# Patient Record
Sex: FEMALE | Race: WHITE | NOT HISPANIC OR LATINO | Employment: OTHER | URBAN - METROPOLITAN AREA
[De-identification: names, ages, dates, MRNs, and addresses within clinical notes are randomized per-mention and may not be internally consistent; named-entity substitution may affect disease eponyms.]

---

## 2019-04-25 ENCOUNTER — OFFICE VISIT (OUTPATIENT)
Dept: UROGYNECOLOGY | Facility: CLINIC | Age: 69
End: 2019-04-25
Payer: MEDICARE

## 2019-04-25 VITALS
HEIGHT: 69 IN | WEIGHT: 198.63 LBS | BODY MASS INDEX: 29.42 KG/M2 | SYSTOLIC BLOOD PRESSURE: 164 MMHG | DIASTOLIC BLOOD PRESSURE: 90 MMHG

## 2019-04-25 DIAGNOSIS — R35.1 NOCTURIA MORE THAN TWICE PER NIGHT: ICD-10-CM

## 2019-04-25 DIAGNOSIS — R33.9 INCOMPLETE EMPTYING OF BLADDER: Primary | ICD-10-CM

## 2019-04-25 DIAGNOSIS — N81.6 RECTOCELE, FEMALE: ICD-10-CM

## 2019-04-25 DIAGNOSIS — N81.11 CYSTOCELE, MIDLINE: ICD-10-CM

## 2019-04-25 DIAGNOSIS — N81.9 VAGINAL VAULT PROLAPSE: ICD-10-CM

## 2019-04-25 DIAGNOSIS — K59.09 CHRONIC CONSTIPATION: ICD-10-CM

## 2019-04-25 DIAGNOSIS — R35.0 URINATION FREQUENCY: ICD-10-CM

## 2019-04-25 DIAGNOSIS — N95.2 VAGINAL ATROPHY: ICD-10-CM

## 2019-04-25 DIAGNOSIS — R15.0 INCOMPLETE DEFECATION: ICD-10-CM

## 2019-04-25 PROCEDURE — 51701 INSERT BLADDER CATHETER: CPT | Mod: S$GLB,,, | Performed by: OBSTETRICS & GYNECOLOGY

## 2019-04-25 PROCEDURE — 51701 PR INSERTION OF NON-INDWELLING BLADDER CATHETERIZATION FOR RESIDUAL UR: ICD-10-PCS | Mod: S$GLB,,, | Performed by: OBSTETRICS & GYNECOLOGY

## 2019-04-25 PROCEDURE — 99205 OFFICE O/P NEW HI 60 MIN: CPT | Mod: 25,S$GLB,, | Performed by: OBSTETRICS & GYNECOLOGY

## 2019-04-25 PROCEDURE — 99999 PR PBB SHADOW E&M-NEW PATIENT-LVL III: ICD-10-PCS | Mod: PBBFAC,,, | Performed by: OBSTETRICS & GYNECOLOGY

## 2019-04-25 PROCEDURE — 99999 PR PBB SHADOW E&M-NEW PATIENT-LVL III: CPT | Mod: PBBFAC,,, | Performed by: OBSTETRICS & GYNECOLOGY

## 2019-04-25 PROCEDURE — 87086 URINE CULTURE/COLONY COUNT: CPT

## 2019-04-25 PROCEDURE — 99205 PR OFFICE/OUTPT VISIT, NEW, LEVL V, 60-74 MIN: ICD-10-PCS | Mod: 25,S$GLB,, | Performed by: OBSTETRICS & GYNECOLOGY

## 2019-04-25 RX ORDER — TRAZODONE HYDROCHLORIDE 100 MG/1
TABLET ORAL
Refills: 2 | COMMUNITY
Start: 2019-02-04 | End: 2022-04-20 | Stop reason: SDUPTHER

## 2019-04-25 RX ORDER — MESALAMINE 375 MG/1
CAPSULE, EXTENDED RELEASE ORAL
Refills: 11 | COMMUNITY
Start: 2019-03-07 | End: 2021-02-10

## 2019-04-25 RX ORDER — OMEPRAZOLE 40 MG/1
CAPSULE, DELAYED RELEASE ORAL
Refills: 3 | COMMUNITY
Start: 2019-03-07 | End: 2022-04-20 | Stop reason: SDUPTHER

## 2019-04-25 NOTE — LETTER
April 30, 2019      Rivera Carr MD  [address] [facility] [place] [address]           [facility]   [address] [place] [id number]  Phone: [phone]          Patient: Sosa Viramontes   MR Number: [id number]   YOB: 1950   Date of Visit: 4/25/2019       Dear Dr. Rivera Carr:    Thank you for referring Sosa Viramontes to me for evaluation. Attached you will find relevant portions of my assessment and plan of care.    If you have questions, please do not hesitate to call me. I look forward to following Sosa Viramontes along with you.    Sincerely,    Jarod Zapien MD    Enclosure  CC:  No Recipients    If you would like to receive this communication electronically, please contact [email or web address] or [phone] to request more information on [facility] access.    For providers and/or their staff who would like to refer a patient to [facility], please contact us through our one-stop-shop provider referral line, [facility], at [phone].    If you feel you have received this communication in error or would no longer like to receive these types of communications, please e-mail [email or web address]

## 2019-04-25 NOTE — PATIENT INSTRUCTIONS
Bladder Irritants  Certain foods and drinks have been associated with worsening symptoms of urinary frequency, urgency, urge incontinence, or bladder pain. If you suffer from any of these conditions, you may wish to try eliminating one or more of these foods from your diet and see if your symptoms improve. If bladder symptoms are related to dietary factors, strict adherence to a diet thateliminates the food should bring marked relief in 10 days. Once you are feeling better, you can begin to add foods back into your diet, one at a time. If symptoms return, you will be able to identify the irritant. As you add foods back to your diet it is very important that you drink significant amounts of water.    -----------------------------------------------------------------------------------------------  List of Common Bladder Irritants*  Alcoholic beverages  Apples and apple juice  Cantaloupe  Carbonated beverages  Chili and spicy foods  Chocolate  Citrus fruit  Coffee (including decaffeinated)  Cranberries and cranberry juice  Grapes  Guava  Milk Products: milk, cheese, cottage cheese, yogurt, ice cream  Peaches  Pineapple  Plums  Strawberries  Sugar especially artificial sweeteners, saccharin, aspartame, corn sweeteners, honey, fructose, sucrose, lactose  Tea  Tomatoes and tomato juice  Vitamin B complex  Vinegar  *Most people are not sensitive to ALL of these products; your goal is to find the foods that make YOUR symptoms worse.  ---------------------------------------------------------------------------------------------------    Low-acid fruit substitutions include apricots, papaya, pears and watermelon. Coffee drinkers can drink Kava or other lowacid instant drinks. Tea drinkers can substitute non-citrus herbal and sun brewed teas. Calcium carbonate co-buffered with calcium ascorbate can be substituted for Vitamin C. Prelief is a dietary supplement that works as an acid blocker for the bladder.    Where to get more  information:        Overcoming Bladder Disorders by Melissa Aguiar and Loan Marcus, 1990        You Dont Have to Live with Cystitis! By Leigh Oconnell, 1988  · http://www.urologymanagement.org/oab    --------------------------------------------------------------------------------      Fiber Information Sheet  Your doctor has recommended that you follow a high fiber diet. The addition of fiber to your diet can make an enormous difference in your bowel control and regularity. Fiber helps people whether they lose stool or have trouble with constipation. Fiber works by bulking the stool and keeping it formed, yet making the movement soft and easy to pass. Fiber helps keep moisture within the stool so that neither diarrhea nor hard stool occurs. Fiber makes the bowels work more regularly, but it is not a laxative. An additional bonus from eating a high fiber diet is that your risk of cancer is reduced, too.    Most of us eat some high fiber foods already, but nearly all of us do not eat the necessary amount. For example, a slice of whole wheat bread contains only about 10% of the daily recommended amount of fiber. This means if you are relying on only whole wheat bread to meet the recommended fiber requirements, you would need to eat  between 10-18 slices every day! Please note that fiber is NOT in any meat or dairy product. It is only found in grains, vegetables and fruits. The recommended daily fiber intake is 20-25 grams. Foods having high fiber content include:     Fiber One Cereal, ½ cup 13.0 g   Dhaliwal beans, ¾ cup 10.4 g   Wheat bran cereal, 1 oz 10.0 g   Kidney beans, ¾ cup 9.3 g   All Bran Cereal, ½ cup 6.0 g   Oat Bran Cereal, hot, 1 oz 4.0 g   Banana, 1medium 3.8 g   Canned pears, ½ cup 3.7 g   3 prunes or ¼ cup raisins 3.5 g   Whole Wheat Total, 1 cup 3.0 g   Carrots, ½ cup 3.2 g   Apple, small 2.8 g   Broccoli, ½ cup 2.8 g   Cauliflower, ½ cup 2.6 g   Oatmeal, 1 oz 2.5  g   Whole Wheat Toast 2.0 g   Cheerios, 1 1/3 cup 2.0 g   Baked potato with skin 2.0 g   Corn, ½ cup 1.9 g   Popcorn, 3 cups 1.9 g   Orange, medium 1.9 g   Granola bar 1.0 g   Lettuce, ½ cup 0.9 g    If you dont think that you can get enough fiber through your everyday diet, there are many good fiber supplements you can take along with eating your high fiber diet. Some of these are: Metamucil (1 heaping teaspoon or 1-2 wafers), Citrucel (1 tablespoon), Fiberall (1-2 wafers or 1 teaspoon), Perdium (2 rounded teaspoons) and 1-2 teaspoons unprocessed bran (to mix with foods)    You may need to use the fiber supplement up to 3-4 times daily to produce normal elimination. Please follow specific package directions or call us for help in regulating the dose. You may notice some bloating and/or increased gas at first. These symptoms can be relieved by adding fiber to your diet slowly. Once your body gets used to this increased fiber, these symptoms will go away.   --------------------------------------------------------------------------      1)  Stage 3 cystocele, stage 2 rectocele, stage 1 vaginal vault prolapse:  --observation vs pessary vs surgery (laparoscopic/robotic sacral colpopexy)  --if surgery:  Need bladder testing to see if hidden leakage and need for sling. (h/o LIZETH procedure)  --really work on chronic straining activity (constipation, heavy lifting)  --if surgery: need to see PCP for clearance.  Last visit 1/19:  Reilly Casanova MD ([place]) + need labs/EKG.      2)  Incomplete bladder emptying:  --see if improves on urodynamics with pessary in place  --renal US same day as UDS    3)  Urinary frequency:  --urine C&S  --may improve with POP repair/improvement of emptying  --Empty bladder every 3 hours.  Empty well: wait a minute, lean forward on toilet.    --Avoid dietary irritants (see sheet).  Keep diary x 3-5 days to determine your irritants.  --KEGELS: do 10 in AM and 10 in PM, holding each x 10 seconds.   When you feel urge to go, STOP, KEGEL, and when urge has passed, then go to bathroom.  Consider PT in future.    --URGE: consider medication in future.  Takes 2-4 weeks to see if will have effect.  For dry mouth: get sour, sugar free lozenge or gum.    --STRESS:  None now.  Do bladder testing to see if need sling.     4)  Constipation with incomplete emptying:  --hydrate well  Controlling constipation may help bladder urgency/leakage and fiber may better control cholesterol and blood glucose.  Start daily fiber.  Take 1 T of fiber powder (psyllium or other sugar-free powder).  Mix in 8 oz of water.  Take x 3-5 days.  Then, increase fiber by 1 T every 3-5 days until stool is easy to pass.  Stop and continue at that dose.   Do not exceed 6 tsps/day.  May also use over the counter stool softener (ducosate sodium) 1-2 x/day.  AVOID laxatives.  --take 400 mg magnesium oxide daily  --take miralax if needed  --incomplete defecation: likely related to rectocele (will repair surgically); improving stool consistency can help this    5)  Vaginal atrophy (dryness):  Use 0.5 gram or dime-sized amount of estrogen cream in vagina twice a week thereafter. Can use applicator or finger.     6)  Nocturia (nighttime urination): stop fluids 2 hours before bed. No water by the bed.  If have leg swelling:  Elevate feet above chest x 1 hour before bed to get excess fluid off.  Can also use support hose (knee highs).      7)  Will have NP call you to see if you'd like to move forward with surgery.

## 2019-04-25 NOTE — PROGRESS NOTES
[facility] UROGYN [facility] 4   [address] [address]    Sosa Viramontes  [id number]  1950    Consulting Physician: Rivera Carr MD   GYN: MD Bruno  Primary M.D.: Reilly Casanova MD ([place])    Chief Complaint   Patient presents with    Vaginal Prolapse    Urinary Retention       HPI:     1)  UI:  (--) LIZETH . (--) UUI.  Does have some extreme urgency and trouble holding when she gets to toilet.   (--) pads. Daytime frequency: Q 1 hours.  Nocturia: Yes: 2/night.   (--) dysuria,  (--) hematuria,  (--) frequent UTIs.  (--) complete bladder emptying. DV/PV to help with mod-large 2nd volume.     2)  POP:  Present. below introitus x years, worsening x few years.  Symptoms:(+)  Discomfort, heaviness, incomplete bladder/bowel evacuation.  Tried pessary--doesn't stay in place.   (--) vaginal bleeding. (+) vaginal discharge/itching. (--) sexually active.  (--) dyspareunia.  (+)  Vaginal dryness.  (+) vaginal estrogen use-started 2 months ago, not using regularly.     3)  BM:  (+) constipation/straining. Has microscopic colitis +polyps--started metamucil 2 T in 1 cup of water. Takes miralax when gets constipated. No stool softener.  Sees GI MD.  (--) chronic diarrhea. (--) hematochezia.  (--) fecal incontinence.  (--) fecal smearing/urgency.  (--) complete evacuation.  Rocks back and forth to evacuate.     Past Medical History  Past Medical History:   Diagnosis Date    Colitis     GERD (gastroesophageal reflux disease)         Past Surgical History  Past Surgical History:   Procedure Laterality Date    ABLATION      BLADDER SUSPENSION      CHOLECYSTECTOMY      HYSTERECTOMY         LSC james    Hysterectomy: Yes  Date: .  Indication: fibroids/menorrhagia.    Type: xlap/vertical   Cervix present: No  Ovaries present: No  Other procedures at time of hysterectomy:  ? Procedure with URO for LIZETH    Past Ob History     x 4.  C/s x 0.    Largest  infant weight: 10#  no FAVD. yes episiotomy.      Gynecologic History  LMP: No LMP recorded. Patient has had a hysterectomy.  Age of menarche: 14 yo  Age of menopause: with EVELIO  Menstrual history: h/o menorrhagia  Pap test: post EVELIO.  History of abnormal paps: No.  History of STIs:  No  Mammogram: Date of last: 2018.  Result: Normal per report.   Colonoscopy: Date of last: 1/2017.  Result:  Normal per report.  Repeat due:  2020 per local GI.    DEXA:  Date of last: 2017.  Result:  Normal per report.  Repeat due:  Per PCP/GYN.     Family History  Family History   Problem Relation Age of Onset    Vaginal cancer Neg Hx     Endometrial cancer Neg Hx     Cervical cancer Neg Hx     Breast cancer Neg Hx       Colon CA: No  Breast CA: Yes - MA  GYN CA: No   CA: No    Social History  Social History     Tobacco Use   Smoking Status Former Smoker   Smokeless Tobacco Never Used     Cessation date: 2006.  PPD:  2 x 36 years.   Social History     Substance and Sexual Activity   Alcohol Use Yes   .    Social History     Substance and Sexual Activity   Drug Use Never     The patient is same sex partner.  Resides [place] [facility] [place] [facility] [place] [address].  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      Allergies  Review of patient's allergies indicates:  No Known Allergies    Medications  Current Outpatient Medications on File Prior to Visit   Medication Sig Dispense Refill    APRISO 0.375 gram Cp24 TK 4 CS PO QAM  11    omeprazole (PRILOSEC) 40 MG capsule TK 1 C PO D  3    ranitidine (ZANTAC) 300 MG tablet TK 1 T PO HS  WITH THE DAYAN MEAL  3    traZODone (DESYREL) 100 MG tablet TK 1 T PO HS  2     No current facility-administered medications on file prior to visit.        Review of Systems A 14 point ROS was reviewed with pertinent positives as noted above in the history of present illness.      Constitutional: negative  Eyes: negative  Endocrine: negative  Gastrointestinal: negative  Cardiovascular: negative  Respiratory:  "negative  Allergic/Immunologic: negative  Integumentary: negative  Psychiatric: negative  Musculoskeletal: negative   Ear/Nose/Throat: negative  Neurologic: negative  Genitourinary: SEE HPI  Hematologic/Lymphatic: negative   Breast: negative    Urogynecologic Exam  BP (!) 164/90 (BP Location: Left arm, Patient Position: Sitting)   Ht 5' 9" (1.753 m)   Wt 90.1 kg (198 lb 10.2 oz)   BMI 29.33 kg/m²     GENERAL APPEARANCE:  The patient is well-developed, well-nourished.  Neck:  Supple with no thyromegaly, no carotid bruits.  Heart:  Regular rate and rhythm, no murmurs, rubs or gallops.  Lungs:  Clear.  No CVA tenderness.  Abdomen:  Soft, nontender, nondistended, no hepatosplenomegaly.  Incisions:  Vertical midline (to umbilicus) well-healed    PELVIC:    External genitalia:  Normal Bartholins, Skenes and labia bilaterally.    Urethra:  No caruncle, diverticulum or masses.  (+) hypermobility.    Vagina:  Atrophy (--) , no bladder masses or tender, no discharge.    Cervix:  absent  Uterus: uterus absent  Adnexa: Not palpable.    POP-Q:  Aa +3; Ba +3; C -4; Ap -1; Bp -1.  Genital hiatus 5, perineal body 2, total vaginal length 10-11.    NEUROLOGIC:  Cranial nerves 2 through 12 intact.  Strength 5/5.  DTRs 2+ lower extremities.  S2 through 4 normal.  Sacral reflexes intact.    EXT: SENA, 2+ pulses bilaterally, no C/C/E    COUGH STRESS TEST:  negative  KEGEL: 1 /5    RECTAL:    External:  Normal, (--) hemorrhoids, (--) dovetailing.   Internal:  deferred    PVR: 70 mL    Impression    1. Incomplete emptying of bladder    2. Cystocele, midline    3. Rectocele, female    4. Vaginal vault prolapse    5. Urination frequency    6. Chronic constipation    7. Incomplete defecation    8. Vaginal atrophy    9. Nocturia more than twice per night        Initial Plan  The patient was counseled regarding these issues. The patient was given a summary sheet containing each of these issues with possible options for evaluation and " management. When appropriate, we also reviewed computer-generated diagrams specific to their diagnoses..  All questions were addressed to the patient's satisfaction.    1)  Stage 3 cystocele, stage 2 rectocele, stage 1 vaginal vault prolapse:  --observation vs pessary vs surgery (laparoscopic/robotic sacral colpopexy)  --if surgery:  Need bladder testing to see if hidden leakage and need for sling. (h/o LIZETH procedure)  --really work on chronic straining activity (constipation, heavy lifting)  --if surgery: need to see PCP for clearance.  Last visit 1/19:  Reilly Casanova MD ([place]) + need labs/EKG.      2)  Incomplete bladder emptying:  --see if improves on urodynamics with pessary in place  --renal US same day as UDS    3)  Urinary frequency:  --urine C&S  --may improve with POP repair/improvement of emptying  --Empty bladder every 3 hours.  Empty well: wait a minute, lean forward on toilet.    --Avoid dietary irritants (see sheet).  Keep diary x 3-5 days to determine your irritants.  --KEGELS: do 10 in AM and 10 in PM, holding each x 10 seconds.  When you feel urge to go, STOP, KEGEL, and when urge has passed, then go to bathroom.  Consider PT in future.    --URGE: consider medication in future.  Takes 2-4 weeks to see if will have effect.  For dry mouth: get sour, sugar free lozenge or gum.    --STRESS:  None now.  Do bladder testing to see if need sling.     4)  Constipation with incomplete emptying:  --hydrate well  Controlling constipation may help bladder urgency/leakage and fiber may better control cholesterol and blood glucose.  Start daily fiber.  Take 1 T of fiber powder (psyllium or other sugar-free powder).  Mix in 8 oz of water.  Take x 3-5 days.  Then, increase fiber by 1 T every 3-5 days until stool is easy to pass.  Stop and continue at that dose.   Do not exceed 6 tsps/day.  May also use over the counter stool softener (ducosate sodium) 1-2 x/day.  AVOID laxatives.  --take 400 mg magnesium oxide  daily  --take miralax if needed  --incomplete defecation: likely related to rectocele (will repair surgically); improving stool consistency can help this    5)  Vaginal atrophy (dryness):  Use 0.5 gram or dime-sized amount of estrogen cream in vagina twice a week thereafter. Can use applicator or finger.     6)  Nocturia (nighttime urination): stop fluids 2 hours before bed. No water by the bed.  If have leg swelling:  Elevate feet above chest x 1 hour before bed to get excess fluid off.  Can also use support hose (knee highs).      7)  Will have NP call you to see if you'd like to move forward with surgery.     Approximately 60 min were spent in consult, 90 % in discussion.    The patient's partner was present for the entire exam and discussion.     Thank you for requesting consultation of your patient.  I look forward to participating in their care.    Jarod Zapien  Female Pelvic Medicine and Reconstructive Surgery  [facility] [place], [place]

## 2019-04-27 LAB — BACTERIA UR CULT: NO GROWTH

## 2019-04-29 ENCOUNTER — TELEPHONE (OUTPATIENT)
Dept: UROGYNECOLOGY | Facility: CLINIC | Age: 69
End: 2019-04-29

## 2019-04-29 NOTE — TELEPHONE ENCOUNTER
Left voice message for pt to give the office a call back at [phone]. Called to relay negative Urine culture results.

## 2019-04-29 NOTE — TELEPHONE ENCOUNTER
Notified pt that her Urine C&S was negative. Pt also states she'd like to schedule surgery in early July.   I informed the pt I'd relay this message to VERONICA Tejeda and someone would get back to her. Pt voiced understanding and call ended.

## 2019-04-29 NOTE — TELEPHONE ENCOUNTER
----- Message from Jarod Zapien MD sent at 4/28/2019  8:02 AM CDT -----  Please let the patient know urine C&S was negative for infection.  Thanks!

## 2019-05-30 ENCOUNTER — TELEPHONE (OUTPATIENT)
Dept: UROGYNECOLOGY | Facility: CLINIC | Age: 69
End: 2019-05-30

## 2019-06-10 ENCOUNTER — TELEPHONE (OUTPATIENT)
Dept: UROGYNECOLOGY | Facility: CLINIC | Age: 69
End: 2019-06-10

## 2019-06-10 ENCOUNTER — PATIENT MESSAGE (OUTPATIENT)
Dept: UROGYNECOLOGY | Facility: CLINIC | Age: 69
End: 2019-06-10

## 2019-06-10 NOTE — TELEPHONE ENCOUNTER
----- Message from Sandy Romero sent at 6/10/2019  8:45 AM CDT -----  Contact: Self            Name of Who is Calling: STAR BENTON [[id number]]      What is the request in detail: Pt states she needs to cancel her procedure for 07/1 and all other appts on 06/27 and states she will call back when she is ready. Please contact to further discuss and advise.        Can the clinic reply by [facility]: N      What Number to Call Back if not in [facility]: [phone]

## 2019-06-10 NOTE — TELEPHONE ENCOUNTER
Returned pt call no answer, Left voice message for pt to give the office a call back at [phone] regarding surgery cancelation.

## 2019-06-10 NOTE — TELEPHONE ENCOUNTER
Attempted to reach patient regarding cancelling surgery. VM left to call office.  Haydee Tejeda, ANUP-BC

## 2019-06-12 ENCOUNTER — TELEPHONE (OUTPATIENT)
Dept: UROGYNECOLOGY | Facility: CLINIC | Age: 69
End: 2019-06-12

## 2019-06-12 NOTE — TELEPHONE ENCOUNTER
Spoke with pt and informed her per NP Marchand July 16, 2019 is ok for her surgery day. Pt also was rescheduled for SUDS, pre-admit and consent signing on 7/11/19 starting at 10 am, reminded pt to get surgery clearance. Pt voiced understanding and call ended. Apt letters mailed out.

## 2019-06-12 NOTE — TELEPHONE ENCOUNTER
----- Message from Westley Ann sent at 6/12/2019  2:14 PM CDT -----  PLEASE CALL PT SHE NEEDS TO RESCHEDULE HER SURGERY [phone]

## 2019-07-01 ENCOUNTER — HOSPITAL ENCOUNTER (OUTPATIENT)
Dept: RADIOLOGY | Facility: HOSPITAL | Age: 69
Discharge: HOME | End: 2019-07-01
Attending: OBSTETRICS & GYNECOLOGY
Payer: MEDICARE

## 2019-07-01 DIAGNOSIS — R33.9 INCOMPLETE EMPTYING OF BLADDER: ICD-10-CM

## 2019-07-01 PROCEDURE — 76770 US RETROPERITONEAL COMPLETE: ICD-10-PCS | Mod: 26,,, | Performed by: RADIOLOGY

## 2019-07-01 PROCEDURE — 76770 US EXAM ABDO BACK WALL COMP: CPT | Mod: 26,,, | Performed by: RADIOLOGY

## 2019-07-01 PROCEDURE — 76770 US EXAM ABDO BACK WALL COMP: CPT | Mod: TC

## 2019-07-02 DIAGNOSIS — N81.9 VAGINAL VAULT PROLAPSE: Primary | ICD-10-CM

## 2019-07-02 DIAGNOSIS — Z01.818 PREOP TESTING: ICD-10-CM

## 2019-07-02 DIAGNOSIS — N81.11 MIDLINE CYSTOCELE: ICD-10-CM

## 2019-07-02 DIAGNOSIS — N81.6 RECTOCELE: ICD-10-CM

## 2019-07-03 ENCOUNTER — TELEPHONE (OUTPATIENT)
Dept: UROGYNECOLOGY | Facility: CLINIC | Age: 69
End: 2019-07-03

## 2019-07-03 NOTE — TELEPHONE ENCOUNTER
----- Message from Jarod Zapien MD sent at 7/2/2019  4:40 PM CDT -----  Please let patient know renal US was normal. Thanks!

## 2019-07-08 ENCOUNTER — HOSPITAL ENCOUNTER (OUTPATIENT)
Dept: CARDIOLOGY | Facility: HOSPITAL | Age: 69
Discharge: HOME | End: 2019-07-08
Attending: INTERNAL MEDICINE
Payer: MEDICARE

## 2019-07-08 ENCOUNTER — HOSPITAL ENCOUNTER (OUTPATIENT)
Dept: RADIOLOGY | Facility: HOSPITAL | Age: 69
Discharge: HOME | End: 2019-07-08
Attending: INTERNAL MEDICINE
Payer: MEDICARE

## 2019-07-08 DIAGNOSIS — R06.02 SOB (SHORTNESS OF BREATH): ICD-10-CM

## 2019-07-08 DIAGNOSIS — R00.2 PALPITATIONS: ICD-10-CM

## 2019-07-08 DIAGNOSIS — R06.02 SOB (SHORTNESS OF BREATH): Primary | ICD-10-CM

## 2019-07-08 PROCEDURE — 71046 X-RAY EXAM CHEST 2 VIEWS: CPT | Mod: TC,FY

## 2019-07-08 PROCEDURE — 93005 ELECTROCARDIOGRAM TRACING: CPT

## 2019-07-08 PROCEDURE — 71046 XR CHEST PA AND LATERAL PRE-OP: ICD-10-PCS | Mod: 26,,, | Performed by: RADIOLOGY

## 2019-07-08 PROCEDURE — 71046 X-RAY EXAM CHEST 2 VIEWS: CPT | Mod: 26,,, | Performed by: RADIOLOGY

## 2019-07-11 ENCOUNTER — ANESTHESIA EVENT (OUTPATIENT)
Dept: SURGERY | Facility: OTHER | Age: 69
End: 2019-07-11
Payer: MEDICARE

## 2019-07-11 ENCOUNTER — HOSPITAL ENCOUNTER (OUTPATIENT)
Dept: PREADMISSION TESTING | Facility: OTHER | Age: 69
Discharge: HOME | End: 2019-07-11
Attending: OBSTETRICS & GYNECOLOGY
Payer: MEDICARE

## 2019-07-11 ENCOUNTER — OFFICE VISIT (OUTPATIENT)
Dept: UROGYNECOLOGY | Facility: CLINIC | Age: 69
End: 2019-07-11
Payer: MEDICARE

## 2019-07-11 ENCOUNTER — PROCEDURE VISIT (OUTPATIENT)
Dept: UROGYNECOLOGY | Facility: CLINIC | Age: 69
End: 2019-07-11
Payer: MEDICARE

## 2019-07-11 VITALS
TEMPERATURE: 98 F | SYSTOLIC BLOOD PRESSURE: 176 MMHG | HEIGHT: 69 IN | HEART RATE: 68 BPM | DIASTOLIC BLOOD PRESSURE: 77 MMHG | BODY MASS INDEX: 27.7 KG/M2 | RESPIRATION RATE: 16 BRPM | OXYGEN SATURATION: 98 % | WEIGHT: 187 LBS

## 2019-07-11 VITALS
BODY MASS INDEX: 27.69 KG/M2 | SYSTOLIC BLOOD PRESSURE: 158 MMHG | DIASTOLIC BLOOD PRESSURE: 74 MMHG | WEIGHT: 186.94 LBS | HEIGHT: 69 IN

## 2019-07-11 VITALS
WEIGHT: 192 LBS | BODY MASS INDEX: 28.44 KG/M2 | SYSTOLIC BLOOD PRESSURE: 158 MMHG | DIASTOLIC BLOOD PRESSURE: 74 MMHG | HEIGHT: 69 IN

## 2019-07-11 DIAGNOSIS — N81.9 VAGINAL VAULT PROLAPSE: ICD-10-CM

## 2019-07-11 DIAGNOSIS — Z01.818 PREOP TESTING: ICD-10-CM

## 2019-07-11 DIAGNOSIS — Z01.818 PREOPERATIVE EXAM FOR GYNECOLOGIC SURGERY: Primary | ICD-10-CM

## 2019-07-11 DIAGNOSIS — N81.6 RECTOCELE: ICD-10-CM

## 2019-07-11 DIAGNOSIS — N81.11 MIDLINE CYSTOCELE: ICD-10-CM

## 2019-07-11 DIAGNOSIS — R33.9 INCOMPLETE EMPTYING OF BLADDER: ICD-10-CM

## 2019-07-11 DIAGNOSIS — N95.2 VAGINAL ATROPHY: ICD-10-CM

## 2019-07-11 LAB
ABO + RH BLD: NORMAL
BILIRUB SERPL-MCNC: NORMAL MG/DL
BLD GP AB SCN CELLS X3 SERPL QL: NORMAL
BLOOD URINE, POC: NORMAL
COLOR, POC UA: NORMAL
GLUCOSE UR QL STRIP: NORMAL
KETONES UR QL STRIP: NORMAL
LEUKOCYTE ESTERASE URINE, POC: NORMAL
NITRITE, POC UA: NORMAL
PH, POC UA: 6
PROTEIN, POC: NORMAL
SPECIFIC GRAVITY, POC UA: 1.01
UROBILINOGEN, POC UA: NORMAL

## 2019-07-11 PROCEDURE — 99499 NO LOS: ICD-10-PCS | Mod: S$GLB,,, | Performed by: NURSE PRACTITIONER

## 2019-07-11 PROCEDURE — 36415 COLL VENOUS BLD VENIPUNCTURE: CPT

## 2019-07-11 PROCEDURE — 52000 PR CYSTOURETHROSCOPY: ICD-10-PCS | Mod: 59,S$GLB,, | Performed by: OBSTETRICS & GYNECOLOGY

## 2019-07-11 PROCEDURE — 99499 UNLISTED E&M SERVICE: CPT | Mod: S$GLB,,, | Performed by: NURSE PRACTITIONER

## 2019-07-11 PROCEDURE — 86850 RBC ANTIBODY SCREEN: CPT

## 2019-07-11 PROCEDURE — 99999 PR PBB SHADOW E&M-EST. PATIENT-LVL III: CPT | Mod: PBBFAC,,, | Performed by: NURSE PRACTITIONER

## 2019-07-11 PROCEDURE — 51741 PR UROFLOWMETRY, COMPLEX: ICD-10-PCS | Mod: 51,S$GLB,, | Performed by: OBSTETRICS & GYNECOLOGY

## 2019-07-11 PROCEDURE — 81002 URINALYSIS NONAUTO W/O SCOPE: CPT | Mod: S$GLB,,, | Performed by: OBSTETRICS & GYNECOLOGY

## 2019-07-11 PROCEDURE — 51741 ELECTRO-UROFLOWMETRY FIRST: CPT | Mod: 51,S$GLB,, | Performed by: OBSTETRICS & GYNECOLOGY

## 2019-07-11 PROCEDURE — 51728 PR COMPLEX CYSTOMETROGRAM VOIDING PRESSURE STUDIES: ICD-10-PCS | Mod: S$GLB,,, | Performed by: OBSTETRICS & GYNECOLOGY

## 2019-07-11 PROCEDURE — 99999 PR PBB SHADOW E&M-EST. PATIENT-LVL III: ICD-10-PCS | Mod: PBBFAC,,, | Performed by: NURSE PRACTITIONER

## 2019-07-11 PROCEDURE — 52000 CYSTOURETHROSCOPY: CPT | Mod: 59,S$GLB,, | Performed by: OBSTETRICS & GYNECOLOGY

## 2019-07-11 PROCEDURE — 81002 POCT URINE DIPSTICK WITHOUT MICROSCOPE: ICD-10-PCS | Mod: S$GLB,,, | Performed by: OBSTETRICS & GYNECOLOGY

## 2019-07-11 PROCEDURE — 51728 CYSTOMETROGRAM W/VP: CPT | Mod: S$GLB,,, | Performed by: OBSTETRICS & GYNECOLOGY

## 2019-07-11 RX ORDER — LIDOCAINE HYDROCHLORIDE 10 MG/ML
0.5 INJECTION, SOLUTION EPIDURAL; INFILTRATION; INTRACAUDAL; PERINEURAL ONCE
Status: CANCELLED | OUTPATIENT
Start: 2019-07-11 | End: 2019-07-11

## 2019-07-11 RX ORDER — LIDOCAINE HYDROCHLORIDE 20 MG/ML
JELLY TOPICAL ONCE
Status: COMPLETED | OUTPATIENT
Start: 2019-07-11 | End: 2019-07-11

## 2019-07-11 RX ORDER — SODIUM CHLORIDE, SODIUM LACTATE, POTASSIUM CHLORIDE, CALCIUM CHLORIDE 600; 310; 30; 20 MG/100ML; MG/100ML; MG/100ML; MG/100ML
INJECTION, SOLUTION INTRAVENOUS CONTINUOUS
Status: CANCELLED | OUTPATIENT
Start: 2019-07-11

## 2019-07-11 RX ORDER — CELECOXIB 200 MG/1
400 CAPSULE ORAL
Status: CANCELLED | OUTPATIENT
Start: 2019-07-11 | End: 2019-07-11

## 2019-07-11 RX ORDER — MIDAZOLAM HYDROCHLORIDE 1 MG/ML
2 INJECTION INTRAMUSCULAR; INTRAVENOUS
Status: CANCELLED | OUTPATIENT
Start: 2019-07-11 | End: 2019-07-11

## 2019-07-11 RX ORDER — ACETAMINOPHEN 500 MG
1000 TABLET ORAL
Status: CANCELLED | OUTPATIENT
Start: 2019-07-11 | End: 2019-07-11

## 2019-07-11 RX ORDER — SULFAMETHOXAZOLE AND TRIMETHOPRIM 800; 160 MG/1; MG/1
1 TABLET ORAL
Status: COMPLETED | OUTPATIENT
Start: 2019-07-11 | End: 2019-07-11

## 2019-07-11 RX ORDER — PREGABALIN 75 MG/1
75 CAPSULE ORAL
Status: CANCELLED | OUTPATIENT
Start: 2019-07-11 | End: 2019-07-11

## 2019-07-11 RX ADMIN — LIDOCAINE HYDROCHLORIDE 5 ML: 20 JELLY TOPICAL at 12:07

## 2019-07-11 RX ADMIN — SULFAMETHOXAZOLE AND TRIMETHOPRIM 1 TABLET: 800; 160 TABLET ORAL at 12:07

## 2019-07-11 NOTE — ANESTHESIA PREPROCEDURE EVALUATION
07/11/2019  Sosa Viramontes is a 68 y.o., female.    Anesthesia Evaluation    I have reviewed the Patient Summary Reports.    I have reviewed the Nursing Notes.   I have reviewed the Medications.     Review of Systems  Anesthesia Hx:  No problems with previous Anesthesia  History of prior surgery of interest to airway management or planning: Denies Family Hx of Anesthesia complications.   Denies Personal Hx of Anesthesia complications.   Social:  Former Smoker    Hematology/Oncology:  Hematology Normal   Oncology Normal     EENT/Dental:EENT/Dental Normal   Cardiovascular:   Exercise tolerance: good Ablation for SVT 12 yrs ago   Pulmonary:  Pulmonary Normal    Renal/:  Renal/ Normal     Hepatic/GI:   GERD, well controlled Esoph Barretts Healed care w [facility]    Musculoskeletal:  Musculoskeletal Normal    Neurological:  Neurology Normal    Endocrine:  Endocrine Normal    Dermatological:  Skin Normal    Psych:  Psychiatric Normal           Physical Exam  General:  Well nourished    Airway/Jaw/Neck:  Airway Findings: Mouth Opening: Normal Tongue: Normal  General Airway Assessment: Adult  Mallampati: II      Dental:  Dental Findings: In tact, Molar caps        Mental Status:  Mental Status Findings:  Cooperative         Anesthesia Plan  Type of Anesthesia, risks & benefits discussed:  Anesthesia Type:  general  Patient's Preference:   Intra-op Monitoring Plan: standard ASA monitors  Intra-op Monitoring Plan Comments:   Post Op Pain Control Plan: multimodal analgesia and per primary service following discharge from PACU  Post Op Pain Control Plan Comments:   Induction:   IV  Beta Blocker:         Informed Consent: Patient understands risks and agrees with Anesthesia plan.  Questions answered. Anesthesia consent signed with patient.  ASA Score: 2     Day of Surgery Review of History & Physical:    H&P update  referred to the surgeon.     Anesthesia Plan Notes: Pt is a landers, requests a small ETT    Labs WNL        Ready For Surgery From Anesthesia Perspective.

## 2019-07-11 NOTE — DISCHARGE INSTRUCTIONS
PRE-ADMIT TESTING -  [phone]    [address]  [place] [facility]          Your surgery has been scheduled at [facility]. We are pleased to have the opportunity to serve you. For Further Information please call [phone].    On the day of surgery please report to the Information Desk on the 1st floor.    · CONTACT YOUR PHYSICIAN'S OFFICE THE DAY PRIOR TO YOUR SURGERY TO OBTAIN YOUR ARRIVAL TIME.     · The evening before surgery do not eat anything after 9 p.m. ( this includes hard candy, chewing gum and mints).  You may only have GATORADE, POWERADE AND WATER  from 9 p.m. until you leave your home.   DO NOT DRINK ANY LIQUIDS ON THE WAY TO THE HOSPITAL.      SPECIAL MEDICATION INSTRUCTIONS: TAKE medications checked off by the Anesthesiologist on your Medication List.    Angiogram Patients: Take medications as instructed by your physician, including aspirin.     Surgery Patients:    If you take ASPIRIN - Your PHYSICIAN/SURGEON will need to inform you IF/OR when you need to stop taking aspirin prior to your surgery.     Do Not take any medications containing IBUPROFEN.  Do Not Wear any make-up or dark nail polish   (especially eye make-up) to surgery. If you come to surgery with makeup on you will be required to remove the makeup or nail polish.    Do not shave your surgical area at least 5 days prior to your surgery. The surgical prep will be performed at the hospital according to Infection Control regulations.    Leave all valuables at home.   Do Not wear any jewelry or watches, including any metal in body piercings. Jewelry must be removed prior to coming to the hospital.  There is a possibility that rings that are unable to be removed may be cut off if they are on the surgical extremity.    Contact Lens must be removed before surgery. Either do not wear the contact lens or bring a case and solution for storage.  Please bring a container for eyeglasses or dentures as required.  Bring  any paperwork your physician has provided, such as consent forms,  history and physicals, doctor's orders, etc.   Bring comfortable clothes that are loose fitting to wear upon discharge. Take into consideration the type of surgery being performed.  Maintain your diet as advised per your physician the day prior to surgery.      Adequate rest the night before surgery is advised.   Park in the Parking lot behind the hospital or in the [facility] across the street from the parking lot. Parking is complimentary.  If you will be discharged the same day as your procedure, please arrange for a responsible adult to drive you home or to accompany you if traveling by taxi.   YOU WILL NOT BE PERMITTED TO DRIVE OR TO LEAVE THE HOSPITAL ALONE AFTER SURGERY.   It is strongly recommended that you arrange for someone to remain with you for the first 24 hrs following your surgery.    The Surgeon will speak to your family/visitor after your surgery regarding the outcome of your surgery and post op care.  The Surgeon may speak to you after your surgery, but there is a possibility you may not remember the details.  Please check with your family members regarding the conversation with the Surgeon.      Thank you for your cooperation.  The Staff of [facility].                Bathing Instructions with Hibiclens     Shower the evening before and morning of your procedure with Hibiclens:   Wash your face with water and your regular face wash/soap   Apply Hibiclens directly on your skin or on a wet washcloth and wash gently. When showering: Move away from the shower stream when applying Hibiclens to avoid rinsing off too soon.   Rinse thoroughly with warm water   Do not dilute Hibiclens         Dry off as usual, do not use any deodorant, powder, body lotions, perfume, after shave or cologne.

## 2019-07-11 NOTE — PROGRESS NOTES
Urogyn follow up  07/11/2019  .  [facility] UR[facility] 4   [address] [address]    Sosa Viramontes  [id number]  1950      Sosa Viramontes is a 68 y.o.  here for a urogyn preop visit.  Last HPI from 04/25/2019    1)  UI:  (--) LIZETH . (--) UUI.  Does have some extreme urgency and trouble holding when she gets to toilet.   (--) pads. Daytime frequency: Q 1 hours.  Nocturia: Yes: 2/night.   (--) dysuria,  (--) hematuria,  (--) frequent UTIs.  (--) complete bladder emptying. DV/PV to help with mod-large 2nd volume.      2)  POP:  Present. below introitus x years, worsening x few years.  Symptoms:(+)  Discomfort, heaviness, incomplete bladder/bowel evacuation.  Tried pessary--doesn't stay in place.   (--) vaginal bleeding. (+) vaginal discharge/itching. (--) sexually active.  (--) dyspareunia.  (+)  Vaginal dryness.  (+) vaginal estrogen use-started 2 months ago, not using regularly.      3)  BM:  (+) constipation/straining. Has microscopic colitis +polyps--started metamucil 2 T in 1 cup of water. Takes miralax when gets constipated. No stool softener.  Sees GI MD.  (--) chronic diarrhea. (--) hematochezia.  (--) fecal incontinence.  (--) fecal smearing/urgency.  (--) complete evacuation.  Rocks back and forth to evacuate      07/11/2019  [place]  1.  VOIDING PHASE:       a.  Uroflowmetry:  · Prolapse reduction: No  · Voided volume:  441 mL   · Voiding time:   230 seconds  · Max flow:  16 mL/s  · Avg flow:   2 mL/s   · PVR:   125 mL     The overall configuration of this uroflow study was prolonged with elevated PVR.       b.  Pressure flow:  · Prolapse reduction: Yes (pessary)  · Patient was unable to void on toilet,  Catheters were removed, and patient allowed to void on toilet, with 1000 mL produced.  · PVR (calculated):  0 mL     PF unable to be performed, but patient was able to void on toilet with 0 calculated PVR.       2.  FILLING PHASE:  · 1st desire: 41 mL  · Normal  desire:  418 mL  · Strong desire:  723 mL  · Urgency:  750 mL  · Compliance (calculated)  ~1000 mL/cm H20  · EMG activity during filling:  With gradual increase.   · Detrusor contractions observed: No.      3.  URETHRAL FUNCTION/STORAGE PHASE:     a.  WITH prolapse reduction:  · CLPP (150 mL): Negative  at  177 cm H20  · VLPP (150 mL): Negative  at  92 cm H20   · CLPP (300 mL): Positive  at  207 cm H20  · VLPP (300 mL): Positive  at  101 cm H20   · CLPP (450 mL): Positive  at  210 cm H20  · VLPP (450 mL): Positive  at  109 cm H20   · CLPP (600 mL): Positive  at  193 cm H20  · VLPP (600 mL): Positive  at  101 cm H20      These findings are consistent with Positive urodynamic stress incontinence.  Assessment:  UF prolonged with elevated PVR.  PF unable to complete but able to void on toilet with normal PVR.  Compliance normal.  Max capacity 1000 mL.  DO (--).  LIZETH (+).      Cysto    Findings: Urethroscopy:  Normal.  Cystoscopy:  Normal bladder mucosa, bilateral ureteral flow was noted.     Assessment: Essentially normal cystourethroscopy      Past Medical History:   Diagnosis Date    Colitis     GERD (gastroesophageal reflux disease)        Past Surgical History:   Procedure Laterality Date    ABLATION      BLADDER SUSPENSION  1998    CHOLECYSTECTOMY      HYSTERECTOMY      1998       Current Outpatient Medications   Medication Sig    APRISO 0.375 gram Cp24 TK 4 CS PO QAM    omeprazole (PRILOSEC) 40 MG capsule TK 1 C PO D    ranitidine (ZANTAC) 300 MG tablet TK 1 T PO HS  WITH THE DAYAN MEAL    traZODone (DESYREL) 100 MG tablet TK 1 T PO HS     No current facility-administered medications for this visit.        Well woman:  Pap test: post EVELIO.  History of abnormal paps: No.  History of STIs:  No  Mammogram: Date of last: 2018.  Result: Normal per report.   Colonoscopy: Date of last: 1/2017.  Result:  Normal per report.  Repeat due:  2020 per local GI.    DEXA:  Date of last: 2017.  Result:  Normal per report.   Repeat due:  Per PCP/GY    ROS:  As per HPI.      Exam  There were no vitals taken for this visit.  General: alert and oriented, no acute distress  Respiratory: normal respiratory effort  Abd: soft, non-tender, non-distended    Pelvic--deferred    Impression  1. Preoperative exam for gynecologic surgery     2. Vaginal vault prolapse     3. Midline cystocele     4. Rectocele     5. Vaginal atrophy       We reviewed the above issues and discussed options for short-term versus long-term management of her problems.   Plan:   1. Patient consented with Dr. Zapien for robotic assisted laparoscopic sacrocolpopexy with synthetic mesh, possible anterior/posterior repair with perineorrhaphy, possible laparotomy, possible uterosacral suspension, placement of synthetic midurethral sling, and cystourethroscopy.   R/B/A reviewed. Specific risks reviewed include:  infection, bleeding, need for blood transfusion, damage to surrounding structures, anesthesia risks, death, heart attack, stroke, mesh erosion/extrusion, pain, dyspareunia, urinary retention, voiding dysfunction, urinary incontinence, exacerbation of urinary urge incontinence, and need for further surgeries.  We reviewed potential for failure of POP defect repair and need for future surgery, with no way of predicting risk.  She understands success rate of ASC approaches 85%.  Success rate of midurethral sling for LIZETH was reviewed as 80-85%, and she understands that this will not necessarily impact other types of urinary incontinence.  Alternatives reviewed include: pessary/PT for POP and pessary/periurethral injections/PT/medication for LIZETH.    2. T&S  3. Preoperative appointment with PCP or cardiology: Yes -   4. VTE Prophylaxis:  heparin 5000 u SQ TID (1st dose 2hrs preop) + SCDs  5. Patient instructed on Magnesium citrate and chlorahexadine/dial soap prep to perform day before & AM of surgery.   6. Proceed to OR for above-mentioned procedure.      30 minutes were spent  in face to face time with this patient  90 % of this time was spent in counseling and/or coordination of care      PHILIP Salamanca-BC  [facility]  Division of Female Pelvic Medicine and Reconstructive Surgery  Department of Obstetrics & Gynecology

## 2019-07-13 NOTE — PROGRESS NOTES
TITLE OF OPERATION:  1. Complex cystometry.  2. Complex uroflowmetry.  3. Electromyography with surface electrodes.  4. Abdominal pressure measurement.  5. Leak point pressure measurement.    INDICATIONS:  1)  UI:  (--) LIZETH . (--) UUI.  Does have some extreme urgency and trouble holding when she gets to toilet.   (--) pads. Daytime frequency: Q 1 hours.  Nocturia: Yes: 2/night.   (--) dysuria,  (--) hematuria,  (--) frequent UTIs.  (--) complete bladder emptying. DV/PV to help with mod-large 2nd volume.      2)  POP:  Present. below introitus x years, worsening x few years.  Symptoms:(+)  Discomfort, heaviness, incomplete bladder/bowel evacuation.  Tried pessary--doesn't stay in place.   (--) vaginal bleeding. (+) vaginal discharge/itching. (--) sexually active.  (--) dyspareunia.  (+)  Vaginal dryness.  (+) vaginal estrogen use-started 2 months ago, not using regularly.   --POP-Q:  Aa +3; Ba +3; C -4; Ap -1; Bp -1.  Genital hiatus 5, perineal body 2, total vaginal length 10-11.  --PVR: 70 mL    3)  BM:  (+) constipation/straining. Has microscopic colitis +polyps--started metamucil 2 T in 1 cup of water. Takes miralax when gets constipated. No stool softener.  Sees GI MD.  (--) chronic diarrhea. (--) hematochezia.  (--) fecal incontinence.  (--) fecal smearing/urgency.  (--) complete evacuation.  Rocks back and forth to evacuate.     PREOPERATIVE DIAGNOSIS:  1. Incomplete emptying of bladder    2. Cystocele, midline    3. Rectocele, female    4. Vaginal vault prolapse    5. Urination frequency    6. Chronic constipation    7. Incomplete defecation    8. Vaginal atrophy    9. Nocturia more than twice per night      POSTOPERATIVE DIAGNOSIS:  1. Incomplete emptying of bladder    2. Cystocele, midline    3. Rectocele, female    4. Vaginal vault prolapse    5. Urination frequency    6. Chronic constipation    7. Incomplete defecation    8. Vaginal atrophy    9. Nocturia more than twice per night    10.  Urodynamic stress  incontinence  11.  Concern for voiding dysfunction    ANESTHESIA:  None.    SPECIMEN (BACTERIOLOGICAL, PATHOLOGICAL OR OTHER):  None.    PROSTHETIC DEVICE/IMPLANT:  None.    SURGEONS NARRATIVE:  A time out was performed in which the patient identity and procedure were confirmed.  Urodynamic evaluation was performed using a computerized system ([employer]  Uroflowmetry was performed on the patient in the sitting position without catheters in place.  Subsequent urodynamic testing was performed with the patient in the lithotomy position at 45 degrees. Air charged catheters were used with sterile water as the infusion medium. Vesical and abdominal (rectal) pressures were measured, and detrusor pressure was calculated. EMG activity was recorded with surface electrodes. During filling, room temperature sterile water was infused at a rate of 30 cubic centimeters per minute. The patient was asked cough after instillation of each 100cc volume. Two Valsalva leak point pressures and two cough leak point pressures were performed with the catheters in place at 300 cubic centimeters and again at maximum capacity. Valsalva leak point pressure was defined as the difference between vesical pressure at which leakage was noted (visualized at the external urethral meatus) and the baseline vesical pressure. Following urodynamic testing, a pressure flow study was performed with the patient in the sitting position. Vesical and abdominal pressures were monitored and detrusor pressures were calculated. After the pressure flow study, the catheters were then removed. The patient tolerated the procedure well.     Urine dipstick: neg.    1.  VOIDING PHASE:      a.  Uroflowmetry:   Prolapse reduction: No   Voided volume:  441 mL    Voiding time:   230 seconds   Max flow:  16 mL/s   Avg flow:   2 mL/s    PVR:   125 mL    The overall configuration of this uroflow study was prolonged with elevated PVR.      b.  Pressure  flow:   Prolapse reduction: Yes (pessary)   Patient was unable to void on toilet,  Catheters were removed, and patient allowed to void on toilet, with 1000 mL produced.   PVR (calculated):  0 mL    PF unable to be performed, but patient was able to void on toilet with 0 calculated PVR.      2.  FILLING PHASE:   1st desire: 41 mL   Normal desire:  418 mL   Strong desire:  723 mL   Urgency:  750 mL   Compliance (calculated)  ~1000 mL/cm H20   EMG activity during filling:  With gradual increase.    Detrusor contractions observed: No.     3.  URETHRAL FUNCTION/STORAGE PHASE:    a.  WITH prolapse reduction:   CLPP (150 mL): Negative  at  177 cm H20   VLPP (150 mL): Negative  at  92 cm H20    CLPP (300 mL): Positive  at  207 cm H20   VLPP (300 mL): Positive  at  101 cm H20    CLPP (450 mL): Positive  at  210 cm H20   VLPP (450 mL): Positive  at  109 cm H20    CLPP (600 mL): Positive  at  193 cm H20   VLPP (600 mL): Positive  at  101 cm H20     These findings are consistent with Positive urodynamic stress incontinence.  Assessment:  UF prolonged with elevated PVR.  PF unable to complete but able to void on toilet with normal PVR.  Compliance normal.  Max capacity 1000 mL.  DO (--).  LIZETH (+).      Plan:  --add MUS to POP procedure  --has some voiding decision  ----------------------------------------------------    Title of Operation:   Cystourethroscopy.     INDICATIONS:  As above    PREOPERATIVE DIAGNOSIS  As above    POSTOPERATIVE DIAGNOSIS:   As above    Anesthesia:   2% Xylocaine gel.    Specimen (Bacteriological, Pathological or other):   None.     Prosthetic Device/Implant:   None.     Surgeons Narrative:     After informed consent was obtained, the patient was placed in the lithotomy position. The urethral meatus was prepped with Betadine and 10 cubic centimeters of 2% Xylocaine gel were introduced into the urethra. A flexible cystourethroscope was introduced into the bladder. The bladder was  distended with approximately 300 cubic centimeters of sterile water. A systematic survey was performed in which the bladder was surveyed using multiple sequential passes in a clockwise fashion from the bladder dome to the bladder base to the urethrovesical junction. The trigone and ureteral orifices were observed. The scope was then flipped back on itself, and the urethrovesical junction was viewed. A vaginal examining finger was then placed with pressure suburethrally at the urethrovesical junction as the telescope was withdrawn in order to perform positive pressure urethroscopy.  Standard maneuvers of cough, squeeze and Valsalva were performed. The telescope was then completely withdrawn.     Findings: Urethroscopy:  Normal.  Cystoscopy:  Normal bladder mucosa, bilateral ureteral flow was noted.     Assessment: Essentially normal cystourethroscopy.     Plan: The patient will follow up with Dr. Zapien as scheduled.  See urodynamics note from 7/11/19 for further plan details.

## 2019-07-16 ENCOUNTER — HOSPITAL ENCOUNTER (OUTPATIENT)
Facility: OTHER | Age: 69
Discharge: HOME | End: 2019-07-17
Attending: OBSTETRICS & GYNECOLOGY | Admitting: OBSTETRICS & GYNECOLOGY
Payer: MEDICARE

## 2019-07-16 ENCOUNTER — ANESTHESIA (OUTPATIENT)
Dept: SURGERY | Facility: OTHER | Age: 69
End: 2019-07-16
Payer: MEDICARE

## 2019-07-16 ENCOUNTER — TELEPHONE (OUTPATIENT)
Dept: UROGYNECOLOGY | Facility: CLINIC | Age: 69
End: 2019-07-16

## 2019-07-16 DIAGNOSIS — N81.9 VAGINAL VAULT PROLAPSE: ICD-10-CM

## 2019-07-16 DIAGNOSIS — Z98.890 S/P LAPAROSCOPY: Primary | ICD-10-CM

## 2019-07-16 PROCEDURE — 36000710: Performed by: OBSTETRICS & GYNECOLOGY

## 2019-07-16 PROCEDURE — 57260 PR COMBINED ANT/POST COLPORRHAPHY: ICD-10-PCS | Mod: 51,,, | Performed by: OBSTETRICS & GYNECOLOGY

## 2019-07-16 PROCEDURE — 63600175 PHARM REV CODE 636 W HCPCS: Performed by: STUDENT IN AN ORGANIZED HEALTH CARE EDUCATION/TRAINING PROGRAM

## 2019-07-16 PROCEDURE — 63600175 PHARM REV CODE 636 W HCPCS: Performed by: OBSTETRICS & GYNECOLOGY

## 2019-07-16 PROCEDURE — 57260 CMBN ANT PST COLPRHY: CPT | Mod: 51,,, | Performed by: OBSTETRICS & GYNECOLOGY

## 2019-07-16 PROCEDURE — 44180 LAP ENTEROLYSIS: CPT | Mod: 22,,, | Performed by: OBSTETRICS & GYNECOLOGY

## 2019-07-16 PROCEDURE — 25000003 PHARM REV CODE 250: Performed by: OBSTETRICS & GYNECOLOGY

## 2019-07-16 PROCEDURE — 27201423 OPTIME MED/SURG SUP & DEVICES STERILE SUPPLY: Performed by: OBSTETRICS & GYNECOLOGY

## 2019-07-16 PROCEDURE — 25000003 PHARM REV CODE 250: Performed by: STUDENT IN AN ORGANIZED HEALTH CARE EDUCATION/TRAINING PROGRAM

## 2019-07-16 PROCEDURE — 25000003 PHARM REV CODE 250: Performed by: NURSE ANESTHETIST, CERTIFIED REGISTERED

## 2019-07-16 PROCEDURE — 25000003 PHARM REV CODE 250: Performed by: ANESTHESIOLOGY

## 2019-07-16 PROCEDURE — 37000009 HC ANESTHESIA EA ADD 15 MINS: Performed by: OBSTETRICS & GYNECOLOGY

## 2019-07-16 PROCEDURE — 71000033 HC RECOVERY, INTIAL HOUR: Performed by: OBSTETRICS & GYNECOLOGY

## 2019-07-16 PROCEDURE — S0020 INJECTION, BUPIVICAINE HYDRO: HCPCS | Performed by: OBSTETRICS & GYNECOLOGY

## 2019-07-16 PROCEDURE — 63600175 PHARM REV CODE 636 W HCPCS: Performed by: ANESTHESIOLOGY

## 2019-07-16 PROCEDURE — 36000711: Performed by: OBSTETRICS & GYNECOLOGY

## 2019-07-16 PROCEDURE — 63600175 PHARM REV CODE 636 W HCPCS: Performed by: NURSE ANESTHETIST, CERTIFIED REGISTERED

## 2019-07-16 PROCEDURE — 44180 PR LAP, SURG ENTEROLYSIS: ICD-10-PCS | Mod: 22,,, | Performed by: OBSTETRICS & GYNECOLOGY

## 2019-07-16 PROCEDURE — 57288 REPAIR BLADDER DEFECT: CPT | Mod: 51,,, | Performed by: OBSTETRICS & GYNECOLOGY

## 2019-07-16 PROCEDURE — 71000039 HC RECOVERY, EACH ADD'L HOUR: Performed by: OBSTETRICS & GYNECOLOGY

## 2019-07-16 PROCEDURE — 57288 PR SLING OPER STRES INCONTINENCE: ICD-10-PCS | Mod: 51,,, | Performed by: OBSTETRICS & GYNECOLOGY

## 2019-07-16 PROCEDURE — 37000008 HC ANESTHESIA 1ST 15 MINUTES: Performed by: OBSTETRICS & GYNECOLOGY

## 2019-07-16 PROCEDURE — C1771 REP DEV, URINARY, W/SLING: HCPCS | Performed by: OBSTETRICS & GYNECOLOGY

## 2019-07-16 DEVICE — SLING HALO SHAPE OBTRYX II: Type: IMPLANTABLE DEVICE | Site: BLADDER | Status: FUNCTIONAL

## 2019-07-16 RX ORDER — LIDOCAINE HYDROCHLORIDE 10 MG/ML
0.5 INJECTION, SOLUTION EPIDURAL; INFILTRATION; INTRACAUDAL; PERINEURAL ONCE
Status: DISCONTINUED | OUTPATIENT
Start: 2019-07-16 | End: 2019-07-16 | Stop reason: HOSPADM

## 2019-07-16 RX ORDER — DIPHENHYDRAMINE HYDROCHLORIDE 50 MG/ML
12.5 INJECTION INTRAMUSCULAR; INTRAVENOUS EVERY 30 MIN PRN
Status: DISCONTINUED | OUTPATIENT
Start: 2019-07-16 | End: 2019-07-16 | Stop reason: HOSPADM

## 2019-07-16 RX ORDER — ONDANSETRON 8 MG/1
8 TABLET, ORALLY DISINTEGRATING ORAL EVERY 8 HOURS PRN
Status: DISCONTINUED | OUTPATIENT
Start: 2019-07-16 | End: 2019-07-17 | Stop reason: HOSPADM

## 2019-07-16 RX ORDER — CELECOXIB 200 MG/1
400 CAPSULE ORAL
Status: DISCONTINUED | OUTPATIENT
Start: 2019-07-16 | End: 2019-07-16 | Stop reason: HOSPADM

## 2019-07-16 RX ORDER — HEPARIN SODIUM 5000 [USP'U]/ML
5000 INJECTION, SOLUTION INTRAVENOUS; SUBCUTANEOUS
Status: DISCONTINUED | OUTPATIENT
Start: 2019-07-16 | End: 2019-07-16 | Stop reason: HOSPADM

## 2019-07-16 RX ORDER — MEPERIDINE HYDROCHLORIDE 25 MG/ML
12.5 INJECTION INTRAMUSCULAR; INTRAVENOUS; SUBCUTANEOUS ONCE AS NEEDED
Status: DISCONTINUED | OUTPATIENT
Start: 2019-07-16 | End: 2019-07-16 | Stop reason: HOSPADM

## 2019-07-16 RX ORDER — NEOSTIGMINE METHYLSULFATE 1 MG/ML
INJECTION, SOLUTION INTRAVENOUS
Status: DISCONTINUED | OUTPATIENT
Start: 2019-07-16 | End: 2019-07-16

## 2019-07-16 RX ORDER — ROCURONIUM BROMIDE 10 MG/ML
INJECTION, SOLUTION INTRAVENOUS
Status: DISCONTINUED | OUTPATIENT
Start: 2019-07-16 | End: 2019-07-16

## 2019-07-16 RX ORDER — BUPIVACAINE HYDROCHLORIDE 5 MG/ML
INJECTION, SOLUTION EPIDURAL; INTRACAUDAL
Status: DISCONTINUED | OUTPATIENT
Start: 2019-07-16 | End: 2019-07-16 | Stop reason: HOSPADM

## 2019-07-16 RX ORDER — HYDROCODONE BITARTRATE AND ACETAMINOPHEN 10; 325 MG/1; MG/1
1 TABLET ORAL EVERY 6 HOURS PRN
Status: DISCONTINUED | OUTPATIENT
Start: 2019-07-16 | End: 2019-07-17 | Stop reason: HOSPADM

## 2019-07-16 RX ORDER — PANTOPRAZOLE SODIUM 40 MG/1
40 TABLET, DELAYED RELEASE ORAL DAILY
Status: DISCONTINUED | OUTPATIENT
Start: 2019-07-16 | End: 2019-07-17 | Stop reason: HOSPADM

## 2019-07-16 RX ORDER — CEFAZOLIN SODIUM 1 G/3ML
2 INJECTION, POWDER, FOR SOLUTION INTRAMUSCULAR; INTRAVENOUS
Status: DISCONTINUED | OUTPATIENT
Start: 2019-07-16 | End: 2019-07-16 | Stop reason: HOSPADM

## 2019-07-16 RX ORDER — LIDOCAINE HCL/PF 100 MG/5ML
SYRINGE (ML) INTRAVENOUS
Status: DISCONTINUED | OUTPATIENT
Start: 2019-07-16 | End: 2019-07-16

## 2019-07-16 RX ORDER — IBUPROFEN 600 MG/1
600 TABLET ORAL EVERY 6 HOURS
Status: DISCONTINUED | OUTPATIENT
Start: 2019-07-16 | End: 2019-07-17 | Stop reason: HOSPADM

## 2019-07-16 RX ORDER — SODIUM CHLORIDE 0.9 % (FLUSH) 0.9 %
3 SYRINGE (ML) INJECTION
Status: DISCONTINUED | OUTPATIENT
Start: 2019-07-16 | End: 2019-07-16

## 2019-07-16 RX ORDER — SODIUM CHLORIDE, SODIUM LACTATE, POTASSIUM CHLORIDE, CALCIUM CHLORIDE 600; 310; 30; 20 MG/100ML; MG/100ML; MG/100ML; MG/100ML
INJECTION, SOLUTION INTRAVENOUS CONTINUOUS
Status: DISCONTINUED | OUTPATIENT
Start: 2019-07-16 | End: 2019-07-17 | Stop reason: HOSPADM

## 2019-07-16 RX ORDER — PHENYLEPHRINE HYDROCHLORIDE 10 MG/ML
INJECTION INTRAVENOUS
Status: DISCONTINUED | OUTPATIENT
Start: 2019-07-16 | End: 2019-07-16

## 2019-07-16 RX ORDER — ONDANSETRON 2 MG/ML
4 INJECTION INTRAMUSCULAR; INTRAVENOUS DAILY PRN
Status: DISCONTINUED | OUTPATIENT
Start: 2019-07-16 | End: 2019-07-16 | Stop reason: HOSPADM

## 2019-07-16 RX ORDER — DEXAMETHASONE SODIUM PHOSPHATE 4 MG/ML
INJECTION, SOLUTION INTRA-ARTICULAR; INTRALESIONAL; INTRAMUSCULAR; INTRAVENOUS; SOFT TISSUE
Status: DISCONTINUED | OUTPATIENT
Start: 2019-07-16 | End: 2019-07-16

## 2019-07-16 RX ORDER — FENTANYL CITRATE 50 UG/ML
INJECTION, SOLUTION INTRAMUSCULAR; INTRAVENOUS
Status: DISCONTINUED | OUTPATIENT
Start: 2019-07-16 | End: 2019-07-16

## 2019-07-16 RX ORDER — MIDAZOLAM HYDROCHLORIDE 1 MG/ML
2 INJECTION INTRAMUSCULAR; INTRAVENOUS
Status: DISCONTINUED | OUTPATIENT
Start: 2019-07-16 | End: 2019-07-16 | Stop reason: HOSPADM

## 2019-07-16 RX ORDER — PROPOFOL 10 MG/ML
VIAL (ML) INTRAVENOUS
Status: DISCONTINUED | OUTPATIENT
Start: 2019-07-16 | End: 2019-07-16

## 2019-07-16 RX ORDER — SODIUM CHLORIDE, SODIUM LACTATE, POTASSIUM CHLORIDE, CALCIUM CHLORIDE 600; 310; 30; 20 MG/100ML; MG/100ML; MG/100ML; MG/100ML
INJECTION, SOLUTION INTRAVENOUS CONTINUOUS
Status: DISCONTINUED | OUTPATIENT
Start: 2019-07-16 | End: 2019-07-16

## 2019-07-16 RX ORDER — HYDROCODONE BITARTRATE AND ACETAMINOPHEN 5; 325 MG/1; MG/1
1 TABLET ORAL EVERY 4 HOURS PRN
Status: DISCONTINUED | OUTPATIENT
Start: 2019-07-16 | End: 2019-07-17 | Stop reason: HOSPADM

## 2019-07-16 RX ORDER — OXYCODONE HYDROCHLORIDE 5 MG/1
5 TABLET ORAL
Status: DISCONTINUED | OUTPATIENT
Start: 2019-07-16 | End: 2019-07-16 | Stop reason: HOSPADM

## 2019-07-16 RX ORDER — ACETAMINOPHEN 500 MG
1000 TABLET ORAL
Status: COMPLETED | OUTPATIENT
Start: 2019-07-16 | End: 2019-07-16

## 2019-07-16 RX ORDER — SODIUM CHLORIDE 0.9 % (FLUSH) 0.9 %
10 SYRINGE (ML) INJECTION
Status: DISCONTINUED | OUTPATIENT
Start: 2019-07-16 | End: 2019-07-17 | Stop reason: HOSPADM

## 2019-07-16 RX ORDER — GLYCOPYRROLATE 0.2 MG/ML
INJECTION INTRAMUSCULAR; INTRAVENOUS
Status: DISCONTINUED | OUTPATIENT
Start: 2019-07-16 | End: 2019-07-16

## 2019-07-16 RX ORDER — ONDANSETRON HYDROCHLORIDE 2 MG/ML
INJECTION, SOLUTION INTRAMUSCULAR; INTRAVENOUS
Status: DISCONTINUED | OUTPATIENT
Start: 2019-07-16 | End: 2019-07-16

## 2019-07-16 RX ORDER — ONDANSETRON 2 MG/ML
4 INJECTION INTRAMUSCULAR; INTRAVENOUS EVERY 6 HOURS PRN
Status: DISCONTINUED | OUTPATIENT
Start: 2019-07-16 | End: 2019-07-17 | Stop reason: HOSPADM

## 2019-07-16 RX ORDER — HEPARIN SODIUM 5000 [USP'U]/ML
5000 INJECTION, SOLUTION INTRAVENOUS; SUBCUTANEOUS EVERY 8 HOURS
Status: DISCONTINUED | OUTPATIENT
Start: 2019-07-16 | End: 2019-07-17 | Stop reason: HOSPADM

## 2019-07-16 RX ORDER — HYDROMORPHONE HYDROCHLORIDE 2 MG/ML
0.4 INJECTION, SOLUTION INTRAMUSCULAR; INTRAVENOUS; SUBCUTANEOUS EVERY 5 MIN PRN
Status: DISCONTINUED | OUTPATIENT
Start: 2019-07-16 | End: 2019-07-16 | Stop reason: HOSPADM

## 2019-07-16 RX ORDER — PREGABALIN 75 MG/1
75 CAPSULE ORAL
Status: COMPLETED | OUTPATIENT
Start: 2019-07-16 | End: 2019-07-16

## 2019-07-16 RX ADMIN — HYDROMORPHONE HYDROCHLORIDE 0.4 MG: 2 INJECTION, SOLUTION INTRAMUSCULAR; INTRAVENOUS; SUBCUTANEOUS at 02:07

## 2019-07-16 RX ADMIN — ROCURONIUM BROMIDE 50 MG: 10 INJECTION INTRAVENOUS at 08:07

## 2019-07-16 RX ADMIN — SODIUM CHLORIDE, SODIUM LACTATE, POTASSIUM CHLORIDE, AND CALCIUM CHLORIDE: 600; 310; 30; 20 INJECTION, SOLUTION INTRAVENOUS at 07:07

## 2019-07-16 RX ADMIN — DEXAMETHASONE SODIUM PHOSPHATE 8 MG: 4 INJECTION, SOLUTION INTRAMUSCULAR; INTRAVENOUS at 08:07

## 2019-07-16 RX ADMIN — CEFAZOLIN 2 G: 330 INJECTION, POWDER, FOR SOLUTION INTRAMUSCULAR; INTRAVENOUS at 08:07

## 2019-07-16 RX ADMIN — PREGABALIN 75 MG: 75 CAPSULE ORAL at 07:07

## 2019-07-16 RX ADMIN — HEPARIN SODIUM 5000 UNITS: 5000 INJECTION, SOLUTION INTRAVENOUS; SUBCUTANEOUS at 10:07

## 2019-07-16 RX ADMIN — HEPARIN SODIUM 5000 UNITS: 5000 INJECTION, SOLUTION INTRAVENOUS; SUBCUTANEOUS at 03:07

## 2019-07-16 RX ADMIN — ROCURONIUM BROMIDE 20 MG: 10 INJECTION INTRAVENOUS at 10:07

## 2019-07-16 RX ADMIN — FENTANYL CITRATE 100 MCG: 50 INJECTION, SOLUTION INTRAMUSCULAR; INTRAVENOUS at 08:07

## 2019-07-16 RX ADMIN — IBUPROFEN 600 MG: 600 TABLET ORAL at 07:07

## 2019-07-16 RX ADMIN — PHENYLEPHRINE HYDROCHLORIDE 100 MCG: 10 INJECTION INTRAVENOUS at 10:07

## 2019-07-16 RX ADMIN — LIDOCAINE HYDROCHLORIDE 60 MG: 20 INJECTION, SOLUTION INTRAVENOUS at 08:07

## 2019-07-16 RX ADMIN — PANTOPRAZOLE SODIUM 40 MG: 40 TABLET, DELAYED RELEASE ORAL at 03:07

## 2019-07-16 RX ADMIN — ONDANSETRON 4 MG: 2 INJECTION, SOLUTION INTRAMUSCULAR; INTRAVENOUS at 08:07

## 2019-07-16 RX ADMIN — OXYCODONE HYDROCHLORIDE 5 MG: 5 TABLET ORAL at 01:07

## 2019-07-16 RX ADMIN — ACETAMINOPHEN 1000 MG: 500 TABLET, FILM COATED ORAL at 07:07

## 2019-07-16 RX ADMIN — HEPARIN SODIUM 5000 UNITS: 5000 INJECTION, SOLUTION INTRAVENOUS; SUBCUTANEOUS at 07:07

## 2019-07-16 RX ADMIN — HYDROCODONE BITARTRATE AND ACETAMINOPHEN 1 TABLET: 5; 325 TABLET ORAL at 10:07

## 2019-07-16 RX ADMIN — SODIUM CHLORIDE, SODIUM LACTATE, POTASSIUM CHLORIDE, AND CALCIUM CHLORIDE: 600; 310; 30; 20 INJECTION, SOLUTION INTRAVENOUS at 10:07

## 2019-07-16 RX ADMIN — GLYCOPYRROLATE 0.2 MG: 0.2 INJECTION, SOLUTION INTRAMUSCULAR; INTRAVENOUS at 08:07

## 2019-07-16 RX ADMIN — NEOSTIGMINE METHYLSULFATE 5 MG: 1 INJECTION INTRAVENOUS at 12:07

## 2019-07-16 RX ADMIN — HYDROMORPHONE HYDROCHLORIDE 0.4 MG: 2 INJECTION, SOLUTION INTRAMUSCULAR; INTRAVENOUS; SUBCUTANEOUS at 01:07

## 2019-07-16 RX ADMIN — GLYCOPYRROLATE 0.6 MG: 0.2 INJECTION, SOLUTION INTRAMUSCULAR; INTRAVENOUS at 12:07

## 2019-07-16 RX ADMIN — SODIUM CHLORIDE, SODIUM LACTATE, POTASSIUM CHLORIDE, AND CALCIUM CHLORIDE: .6; .31; .03; .02 INJECTION, SOLUTION INTRAVENOUS at 03:07

## 2019-07-16 RX ADMIN — PROPOFOL 200 MG: 10 INJECTION, EMULSION INTRAVENOUS at 08:07

## 2019-07-16 NOTE — PROGRESS NOTES
Post Op Progress Note  Urogynecology    SUBJECTIVE:     Sosa Viramontes is a 68 y.o.  who is POD#0 s/p RA-diagnostic laparoscopy, mid urethral sling, anterior and posterior repair for the treatment of pelvic organ prolapse.  She has no complaints and reports pain is well controlled. She has not yet ambulated. She is tolerating a regular diet already. She is voiding via edward catheter and has passed flatus.     OBJECTIVE:     Vital Signs   Temp:  [97.8 °F (36.6 °C)-98.5 °F (36.9 °C)] 98 °F (36.7 °C)  Pulse:  [66-75] 71  Resp:  [18-20] 18  SpO2:  [93 %-98 %] 96 %  BP: (127-194)/(56-84) 129/61    Intake/Output Summary (Last 24 hours) at 2019 1722  Last data filed at 2019 1343  Gross per 24 hour   Intake 1800 ml   Output 1050 ml   Net 750 ml     Physical Exam:  Gen: A&Ox3, NAD  CV: RRR  Pulm: LCTAB, normal respiratory effort  Abd: active bowel sounds, soft, non-distended, non-tender to palpation without rebound or guarding  Incisions with bandages in place that are clean and dry.  Ext: PPP, no peripheral edema, TEDs/SCDs in place  : Edward in place draining clear yellow urine     ASSESSMENT/PLAN:     Assessment: 68 y.o.  s/p RA-dx laparoscopy, MUS, A&P repair, and cysto for the treatment of POP.     Plan:   Continue routine post op care:  Regular diet. Will d/c IVF when tolerating a full meal.   Continue PO pain control  DVT ppx with teds/SCDs, and heparin 5000 u tid  Continue pantoprazole for reflux  Colace for constipation ppx    Khari Martinez M.D.  Obstetrics & Gynecology  PGY-3

## 2019-07-16 NOTE — PLAN OF CARE
Discharge Planning:  Patient admitted on 7-16-19  LOS-day 0  Chart reviewed, Care plan discussed  Discussed care plan with treatment team,  attending Dr Zapien  Consults following are: case mgt.   PCP updated in [facility]: yes  Pharmacy, updated in [facility]: [facility] in [place], [place]  DME at home: nebulizer  Current dispo: pain control, home soon  Transportation: has reliable, S/O at the bedside  Case management  to follow       07/16/19 1529   Discharge Assessment   Assessment Type Discharge Planning Assessment   Confirmed/corrected address and phone number on facesheet? Yes   Assessment information obtained from? Patient;Caregiver;Medical Record   Communicated expected length of stay with patient/caregiver yes   Prior to hospitilization cognitive status: Alert/Oriented   Prior to hospitalization functional status: Independent   Current cognitive status: Alert/Oriented   Current Functional Status: Independent   Lives With significant other   Able to Return to Prior Arrangements yes   Is patient able to care for self after discharge? Yes   Equipment Currently Used at Home nebulizer   Do you have any problems affording any of your prescribed medications? No   Is the patient taking medications as prescribed? yes   Does the patient have transportation home? Yes   Transportation Anticipated family or friend will provide;car, drives self   Discharge Plan A Home   Patient/Family in Agreement with Plan yes

## 2019-07-16 NOTE — ANESTHESIA POSTPROCEDURE EVALUATION
Anesthesia Post Evaluation    Patient: Sosa Viramontes    Procedure(s) Performed: Procedure(s) (LRB):  COLPORRHAPHY, COMBINED ANTEROPOSTERIOR (N/A)  SLING, MIDURETHRAL (N/A)  CYSTOSCOPY (N/A)  XI ROBOTIC LYSIS, ADHESIONS (N/A)    Final Anesthesia Type: general  Patient location during evaluation: PACU  Patient participation: Yes- Able to Participate  Level of consciousness: awake and alert  Post-procedure vital signs: reviewed and stable  Pain management: adequate  Airway patency: patent  PONV status at discharge: No PONV  Anesthetic complications: no      Cardiovascular status: blood pressure returned to baseline and stable  Respiratory status: unassisted, spontaneous ventilation and nasal cannula  Hydration status: euvolemic  Follow-up not needed.          Vitals Value Taken Time   /56 7/16/2019  1:40 PM   Temp 36.7 °C (98 °F) 7/16/2019  1:30 PM   Pulse 66 7/16/2019  1:51 PM   Resp 20 7/16/2019  1:40 PM   SpO2 95 % 7/16/2019  1:51 PM   Vitals shown include unvalidated device data.      No case tracking events are documented in the log.      Pain/Lazarus Score: Pain Rating Prior to Med Admin: 4 (7/16/2019  1:33 PM)  Pain Rating Post Med Admin: 4 (7/16/2019  1:29 PM)  Lazarus Score: 10 (7/16/2019  1:42 PM)

## 2019-07-16 NOTE — TELEPHONE ENCOUNTER
Spoke with Eli at [facility]-- attempted to expedite claim. She stated we could not expedite based on date of service.  She reviewed clinicals and will approve sling.  Dr. Zapien aware.  Haydee Tejeda, PHILIP-BC

## 2019-07-16 NOTE — TRANSFER OF CARE
"Anesthesia Transfer of Care Note    Patient: Sosa Viramontes    Procedure(s) Performed: Procedure(s) (LRB):  COLPORRHAPHY, COMBINED ANTEROPOSTERIOR (N/A)  SLING, MIDURETHRAL (N/A)  CYSTOSCOPY (N/A)  XI ROBOTIC LYSIS, ADHESIONS (N/A)    Patient location: PACU    Anesthesia Type: general    Transport from OR: Transported from OR on 2-3 L/min O2 by NC with adequate spontaneous ventilation    Post pain: adequate analgesia    Post assessment: no apparent anesthetic complications    Post vital signs: stable    Level of consciousness: awake and alert    Nausea/Vomiting: no nausea/vomiting    Complications: none    Transfer of care protocol was followed      Last vitals:   Visit Vitals  BP (!) 194/84 (BP Location: Right arm, Patient Position: Lying)   Pulse 69   Temp 36.9 °C (98.5 °F) (Oral)   Resp 18   Ht 5' 9" (1.753 m)   Wt 84.8 kg (187 lb)   SpO2 98%   Breastfeeding? No   BMI 27.62 kg/m²     "

## 2019-07-16 NOTE — OP NOTE
Title of Operation:  1)  Robotic-assisted laparoscopic lysis of adhesions (modifier 22 for complexity of dissection, >45 minutes added to case time, and need to abort laparoscopic approach due to significant volume/density)  2)  Anterior repair  3)  Placement of transobturator tension-free midurethral sling, Obtryx II ("[employer]" [employer])  4)  Posterior repair with perineorrhaphy  5)  Cystourethroscopy    Indications for Surgery:  1)  UI:  (--) LIZETH . (--) UUI.  Does have some extreme urgency and trouble holding when she gets to toilet.   (--) pads. Daytime frequency: Q 1 hours.  Nocturia: Yes: 2/night.   (--) dysuria,  (--) hematuria,  (--) frequent UTIs.  (--) complete bladder emptying. DV/PV to help with mod-large 2nd volume.      2)  POP:  Present. below introitus x years, worsening x few years.  Symptoms:(+)  Discomfort, heaviness, incomplete bladder/bowel evacuation.  Tried pessary--doesn't stay in place.   (--) vaginal bleeding. (+) vaginal discharge/itching. (--) sexually active.  (--) dyspareunia.  (+)  Vaginal dryness.  (+) vaginal estrogen use-started 2 months ago, not using regularly.      3)  BM:  (+) constipation/straining. Has microscopic colitis +polyps--started metamucil 2 T in 1 cup of water. Takes miralax when gets constipated. No stool softener.  Sees GI MD.  (--) chronic diarrhea. (--) hematochezia.  (--) fecal incontinence.  (--) fecal smearing/urgency.  (--) complete evacuation.  Rocks back and forth to evacuate        07/11/2019  Suds  1.  VOIDING PHASE:       a.  Uroflowmetry:  · Prolapse reduction: No  · Voided volume:  441 mL   · Voiding time:   230 seconds  · Max flow:  16 mL/s  · Avg flow:   2 mL/s   · PVR:   125 mL     The overall configuration of this uroflow study was prolonged with elevated PVR.       b.  Pressure flow:  · Prolapse reduction: Yes (pessary)  · Patient was unable to void on toilet,  Catheters were removed, and patient allowed to void on toilet, with 1000 mL  produced.  · PVR (calculated):  0 mL     PF unable to be performed, but patient was able to void on toilet with 0 calculated PVR.       2.  FILLING PHASE:  · 1st desire: 41 mL  · Normal desire:  418 mL  · Strong desire:  723 mL  · Urgency:  750 mL  · Compliance (calculated)  ~1000 mL/cm H20  · EMG activity during filling:  With gradual increase.   · Detrusor contractions observed: No.      3.  URETHRAL FUNCTION/STORAGE PHASE:     a.  WITH prolapse reduction:  · CLPP (150 mL): Negative  at  177 cm H20  · VLPP (150 mL): Negative  at  92 cm H20   · CLPP (300 mL): Positive  at  207 cm H20  · VLPP (300 mL): Positive  at  101 cm H20   · CLPP (450 mL): Positive  at  210 cm H20  · VLPP (450 mL): Positive  at  109 cm H20   · CLPP (600 mL): Positive  at  193 cm H20  · VLPP (600 mL): Positive  at  101 cm H20      These findings are consistent with Positive urodynamic stress incontinence.  Assessment:  UF prolonged with elevated PVR.  PF unable to complete but able to void on toilet with normal PVR.  Compliance normal.  Max capacity 1000 mL.  DO (--).  LIZETH (+).     Cysto  Findings: Urethroscopy:  Normal.  Cystoscopy:  Normal bladder mucosa, bilateral ureteral flow was noted.   Assessment: Essentially normal cystourethroscopy    Preoperative Diagnosis:  1. Incomplete emptying of bladder    2. Cystocele, midline    3. Rectocele, female    4. Vaginal vault prolapse    5. Urination frequency    6. Chronic constipation    7. Incomplete defecation    8. Vaginal atrophy    9. Nocturia more than twice per night    10.  Urodynamic stress incontinence  11.  Concern for voiding dysfunction    Postoperative Diagnosis:  1. Incomplete emptying of bladder    2. Cystocele, midline    3. Rectocele, female    4. Vaginal vault prolapse    5. Urination frequency    6. Chronic constipation    7. Incomplete defecation    8. Vaginal atrophy    9. Nocturia more than twice per night    10.  Urodynamic stress incontinence  11.  Concern for voiding  dysfunction    Anesthesia:  General endotracheal anesthesia.  Additionally, 0.5% marcaine was injected prior to placement of each laparoscopic port, and a dilute solution of 1% lidocaine with epinephrine was injected vaginally for local anesthesia.    Specimen (Bacteriological, Pathological or other):  none    Prosthetic Device/Implant:  1)  Obtryx II sling.  LOT# [id number].      Surgeons Narrative:    Surgeon: Jarod Zapien MD    Assistants:  Khari Martinez MD ([id number])     Intravenous Fluids:  1500 mL     Estimated Blood Loss:  100 mL     Urine Output:  850 mL     Counts:  Sponge, lap, needle counts correct x 2.     Drains: Pickering catheter.     Disposition:  The patient was sent to the PACU in stable condition.     Findings:     1.  On exam under anesthesia,  normal external female genitalia. There was prolapse as previously noted in clinic.  Uterus and cervix: Absent  Adnexa: non palpable.      2.  On laparoscopic survey:    --The bowel was grossly Normal.  The sigmoid colon was significantly redundant, impeding visualization of relevant anatomy, even with retraction.  There were significant pelvic adhesions from the small bowel to the right pelvic sidewall at the pelvic brim--lysed sharply, from the sigmoid and colon to the entire right pelvic sidewall/cul-de-sac/left pelvic sidewall--lysed mostly but dense adhesions were noted from the sigmoid to the right lower pelvic sidewall and sigmoid colon/colon, which were unable to be clearly navigated to allow visualization of surgical anatomy appropriately.  Therefore, based on presence of dense adhesion, the fact that the uterosacral ligaments were scant, and the fact that the sacral promontory/path for ASC dissection was not clearly visible in presence of dense adhesions/sigmoid redundancy, decision was made to abort laparoscopic approach for vaginal approach.   --The appendix was not visualized.   --The uterus and cervix, bilateral fallopian tubes/ovaries were absent.    --The liver margin Normal.   --The gall bladder was absent.   --Bilateral ureters were visualized and noted to vermiculate at the start and close of the case.    --Adhesions were present as above noted.   --Other findings included:  none    3.  On cystoscopy, the bladder mucosa was Normal.  After RA-EBEN/anterior repair/sling, there was no suture or mesh within the bladder mucosa.  The ureteral orifices were visualized bilaterally with (+) noted good efflux x 2. On a systematic survey of the bladder dome to the base of the urethrovesical junction, there were not other abnormalities noted. The urethra was normal on retraction of the scope.     4.  Rectal exam:  At the close of the case, rectal exam was performed.  There was no suture, mesh, or other injury noted on exam.  No obvious masses were palpated.       Description of procedure:    The patient was identified in the preoperative area where informed consent was confirmed, and she was taken to the operating room where an adequate level of general anesthesia was obtained.  The patient was positioned in lithotomy position with legs in Ismael stirrups. Care was taken to avoid joint hyperflexion or hyperextension, and all extremity surfaces were carefully padded so as to minimize risk of neurologic injury. Intravenous antibiotics were administered preoperatively. Sequential compression devices were applied to the patient's lower extremities preoperatively and heparin was administered subcutaneously for VTE prophylaxis.  Surgical time-out was performed, where the patient was identified and procedures confirmed.  An examination under anesthesia was performed with findings described as above.  The patient's abdomen, perineum, and vagina were sterilely prepped and draped. A edward catheter was placed in the bladder for drainage.      First, we placed laparoscopic ports. Before placement of each laparoscopic port, several mL of 0.5% Marcaine were injected subcutaneously as  well as deeply into the tissue of each site.  First, a LUQ 5 mm incision was made, and a 5 mm trocar was placed through the until peritoneal entry was gained and confirmed.  Carbon dioxide was insufflated through the port until an adequate pneumoperitoneum of no greater than 15 mm Hg was obtained.  The laparoscopic camera was inserted through this port for visualization of all subsequent port placement.  An 8 mm infraumbilical incision was made, and a robotic trocar was inserted.  The robotic camera was then placed through this port, and the laparoscopic 5 mm camera was removed.  The 5 mm LUQ port was replaced with an 8 mm robotic port, but a new incision was made about 2-3 cm caudal to the initial incision.  Two 8 mm ports were place on bilaterally to the supraumbilical port, each approximately 8-10 cm lateral, along the mid clavicular line just below the level of the umbilicus, at least 2 cm cephalad and medial of the anterior superior iliac spine. Each 8mm trocar was inserted under direct visualization without significant trauma.   An 8 mm Airseal port was placed in the right upper quadrant, superior to the umbilicus and midway between the right lower quadrant port and the umbilicus in a triangulated fashion.  There were no complications with these port placements. A total of 5 ports had been placed, including the supraumbilical port for the camera, but there were 6 incisions due to more caudal replacement of the initial LUQ incision. The robot was then docked.      The procedure commenced with robotic-assisted lysis of adhesions.  The sigmoid colon was significantly redundant, impeding visualization of relevant anatomy, even with retraction.  There were significant pelvic adhesions from the small bowel to the right pelvic sidewall at the pelvic brim--lysed sharply, from the sigmoid and colon to the entire right pelvic sidewall/cul-de-sac/left pelvic sidewall--lysed mostly but dense adhesions were noted from the  sigmoid to the right lower pelvic sidewall and sigmoid colon/colon, which were unable to be clearly navigated to allow visualization of surgical anatomy appropriately.  Therefore, based on presence of dense adhesion, the fact that the uterosacral ligaments were scant, and the fact that the sacral promontory/path for ASC dissection was not clearly visible in presence of dense adhesions/sigmoid redundancy, decision was made to abort laparoscopic approach for vaginal approach.     At this point, the robotic/laparoscopic portion of the procedure was completed. The pelvis was irrigated, and all irrigants were removed. All abdominal wall incisions were <1 cm, and fascial closure was unnecessary. The abdomen was deflated and all laparoscopic sleeves and other instruments were removed from the abdomen. All laparoscopic skin incisions were closed with subcuticular stitches of 4-0 Monocryl and secured with steri strips and band-aids.  Excellent cosmesis and hemostasis was noted.          The procedure was then turned vaginally for performance of the anterior repair.  Of note, there was some apical support with C -5.  Allis clamps were used to delineate the proximal-most  (approximately 1 cm distal to the cuff) and distal-most (1 cm cephalad to the urethrovesical junction) aspects of the defect, approximately 1 cm distal to the cuff.  This was then injected with 1% lidocaine with epinephrine, allowing for hydrodistention of the tissue.  A vertical incision was made between the 2 Allis clamps.  Allis clamps were then placed along the margins of the epithelial incision and the Metzenbaum scissors were used to separate the underlying fibromuscular tissue from the epithelium bilaterally to the lateral-most aspects of the defect.  Several stitches of 2-0 PDS suture were then used in mattress fashion to reapproximate the cystocele defect.  This resulted in excellent reduction of the defect.  Excellent hemostasis was noted at the end  of this part of the procedure.  The bed of the repair was irrigated with sterile water.  The epithelial defect was then trimmed and closed with a running-locking stitch of 2-0 Vicryl.         Next, we placed the transobturator tension-free mid urethral sling:  Obtryx II. The vaginal epithelium underlying the mid urethra was grasped, and a subepithelial injection was made with 1% lidocaine with epinephrine.  A 1 cm vertical incision was made, and the underlying vesicovaginal fibromuscular connective tissue was dissected off of the vaginal epithelium bilaterally to the junction between the pubic bone with the pubic rami.  Before making skin incisions for entry of the vaginal sling, we palpated the edge of the ischiopubic ramus just inferior to the insertion of the adductor longus tendon.  A small stab incision was made in this area with a clean knife bilaterally.  We confirmed that the incisions were approximately the level of the clitoris.  The helical needle was then grasped and inserted into the stab wound made on the patient's right side.  The needle was inserted perpendicular to the skin, curving around the pubic ramus, towards an opposing index finger which was inserted under the vaginal epithelium. The mesh arm was then attached to the needle, and the needle was retracted back along its path of introduction. The steps were then repeated on the patient's left side which resulted in placement of the Obtryx II sling at the level of the midurethra.  After both mesh arms had been placed, a cystourethroscopy was performed with findings as noted above. There were no abnormalities or injury. The sling was then tensioned with a 10 mm Hegar dilator between the sling and the midurethra. The plastic sheaths were released along each sling arm by sharply transecting the stay suture.  The sheaths were removed, and the excess mesh arms were trimmed at the level of the skin.  The midurethral plastic midline marker was then  removed sharply.  The groin sites were then closed with Dermabond. The vaginal epithelium was closed several mattress sutures of 2-0 Vicryl.  Excellent hemostasis was noted.    Finally, we performed the posterior colporrhaphy and perineorrhaphy.  Allis clamps were placed on the left and right hymenal remnants and at the proximal limit of the rectovaginal septal defect along the vaginal mucosa. The perineal skin to be resected for the perineorrhaphy was grasped with several Allis clamps.  The perineum and vaginal mucosa were injected with 1% lidocaine with epinephrine, which was distributed beneath the perineal skin and vaginal mucosa both in the midline and in the direction of the fornices. The perineal skin within the outlined area was dissected off the underlying perineal body with Metzenbaum scissors.  The posterior vaginal mucosa was then incised in the midline with Metzenbaum scissors.  The left and right margins of the vaginal mucosa were grasped with Allis clamps and the vaginal mucosa was dissected sharply off of the underlying rectovaginal fibromuscular connective tissue.  Individual areas of bleeding were made hemostatic with the electrocautery.  The rectovaginal fibromuscular tissue was exposed bilaterally to the margins of the levator ani muscles and plicated in the midline with a series of vertical mattress stitches of 0-vicryl.  As we approached the introitus, we brought together the perineal body in the midline between the hymenal remnants.  A series of vertical mattress stitches were used to plicate the perineal body beneath the perineal skin thereby plicating the superficial and deep transverse perineal muscles and bulbocavernosus muscles.  Care was taken to make sure that vaginal caliber/ introitus allowed 2 - 3 fingerbreadths to be placed without difficulty. There was no significant posterior vaginal wall contracture/ ridging at the completion of the plication. The vaginal mucosal margins of the  vertical incision were trimmed with Metzenbaum scissors to remove less than 1/2 centimeter of reduntant mucosa on each side and reapproximated with a running, locking stitch of 2-0 Vicryl.  Excellent hemostasis was observed along the suture line. Vaginal exam confirmed good reinforcement of the rectocele site without ridging or significant contracture of the vault.  Rectovaginal examination was performed with findings as described above.    At this point, vaginal exam revealed excellent support of all vaginal walls.  Rectal exam was performed with negative findings as above noted.     At the close of the case, all counts were correct x2.  The vagina was irrigated, and all irrigants were removed.  The vagina was packed with a role of Kerlix, coated with Premarin cream for assurance of immediate postop hemostasis.  The Pickering was in place and draining well.  The patient was awakened from general endotracheal anesthesia and was taken to the Recovery Room in stable condition.  She tolerated the procedure well.    I was present and scrubbed for the entire procedure.

## 2019-07-16 NOTE — PROGRESS NOTES
Pt transferred from OPACU via stretcher AAOX4. VSS ON 2L O2 NC. Pt denies pain at this time. Lap sites x5 CDI w/ bandaids and steri-strips. Pickering to gravity; draining clear yellow urine. IV fluids infusing per MD order.Room orientation given. Meal tray being ordered. Fluids encouraged.Pt repositions self independently. Purposeful hourly rounding. Pt has call light in reach, side rails up X2, bed in low position, SCDs/TEDs and nonskid socks on. Pt lying in bed in no distress with friend at the bedside.

## 2019-07-16 NOTE — INTERVAL H&P NOTE
The patient has been examined and the H&P has been reviewed:    I concur with the findings and no changes have occurred since H&P was written.    Anesthesia/Surgery risks, benefits and alternative options discussed and understood by patient/family.          Active Hospital Problems    Diagnosis  POA    Vaginal vault prolapse [N81.9]  Yes      Resolved Hospital Problems   No resolved problems to display.

## 2019-07-16 NOTE — OR NURSING
Spoke with Dr. Conti about patient not being cleared for surgery with Insurance.  Dr. Conti will get in touch with the resident who is working with this case.

## 2019-07-17 VITALS
RESPIRATION RATE: 18 BRPM | TEMPERATURE: 98 F | SYSTOLIC BLOOD PRESSURE: 166 MMHG | OXYGEN SATURATION: 99 % | BODY MASS INDEX: 27.7 KG/M2 | DIASTOLIC BLOOD PRESSURE: 72 MMHG | HEIGHT: 69 IN | WEIGHT: 187 LBS | HEART RATE: 55 BPM

## 2019-07-17 PROBLEM — Z98.890 S/P LAPAROSCOPY: Status: ACTIVE | Noted: 2019-07-17

## 2019-07-17 LAB
BASOPHILS # BLD AUTO: 0.01 K/UL (ref 0–0.2)
BASOPHILS NFR BLD: 0.1 % (ref 0–1.9)
DIFFERENTIAL METHOD: ABNORMAL
EOSINOPHIL # BLD AUTO: 0 K/UL (ref 0–0.5)
EOSINOPHIL NFR BLD: 0.3 % (ref 0–8)
ERYTHROCYTE [DISTWIDTH] IN BLOOD BY AUTOMATED COUNT: 13.5 % (ref 11.5–14.5)
HCT VFR BLD AUTO: 37.4 % (ref 37–48.5)
HGB BLD-MCNC: 12.3 G/DL (ref 12–16)
IMM GRANULOCYTES # BLD AUTO: 0.04 K/UL (ref 0–0.04)
IMM GRANULOCYTES NFR BLD AUTO: 0.4 % (ref 0–0.5)
LYMPHOCYTES # BLD AUTO: 1.6 K/UL (ref 1–4.8)
LYMPHOCYTES NFR BLD: 14.2 % (ref 18–48)
MCH RBC QN AUTO: 29.6 PG (ref 27–31)
MCHC RBC AUTO-ENTMCNC: 32.9 G/DL (ref 32–36)
MCV RBC AUTO: 90 FL (ref 82–98)
MONOCYTES # BLD AUTO: 0.9 K/UL (ref 0.3–1)
MONOCYTES NFR BLD: 8.3 % (ref 4–15)
NEUTROPHILS # BLD AUTO: 8.4 K/UL (ref 1.8–7.7)
NEUTROPHILS NFR BLD: 76.7 % (ref 38–73)
NRBC BLD-RTO: 0 /100 WBC
PLATELET # BLD AUTO: 189 K/UL (ref 150–350)
PMV BLD AUTO: 11.5 FL (ref 9.2–12.9)
RBC # BLD AUTO: 4.15 M/UL (ref 4–5.4)
WBC # BLD AUTO: 10.98 K/UL (ref 3.9–12.7)

## 2019-07-17 PROCEDURE — 25000003 PHARM REV CODE 250: Performed by: STUDENT IN AN ORGANIZED HEALTH CARE EDUCATION/TRAINING PROGRAM

## 2019-07-17 PROCEDURE — 36415 COLL VENOUS BLD VENIPUNCTURE: CPT

## 2019-07-17 PROCEDURE — 85025 COMPLETE CBC W/AUTO DIFF WBC: CPT

## 2019-07-17 PROCEDURE — 63600175 PHARM REV CODE 636 W HCPCS: Performed by: STUDENT IN AN ORGANIZED HEALTH CARE EDUCATION/TRAINING PROGRAM

## 2019-07-17 RX ORDER — IBUPROFEN 600 MG/1
600 TABLET ORAL EVERY 6 HOURS PRN
Qty: 60 TABLET | Refills: 0 | Status: SHIPPED | OUTPATIENT
Start: 2019-07-17 | End: 2019-08-22

## 2019-07-17 RX ORDER — NITROFURANTOIN 25; 75 MG/1; MG/1
100 CAPSULE ORAL DAILY
Qty: 4 CAPSULE | Refills: 0 | Status: SHIPPED | OUTPATIENT
Start: 2019-07-17 | End: 2019-07-21

## 2019-07-17 RX ORDER — DOCUSATE SODIUM 100 MG/1
100 CAPSULE, LIQUID FILLED ORAL 2 TIMES DAILY
Qty: 60 CAPSULE | Refills: 1 | Status: SHIPPED | OUTPATIENT
Start: 2019-07-17 | End: 2020-07-16

## 2019-07-17 RX ORDER — PHENAZOPYRIDINE HYDROCHLORIDE 200 MG/1
200 TABLET, FILM COATED ORAL 3 TIMES DAILY PRN
Qty: 10 TABLET | Refills: 0 | Status: SHIPPED | OUTPATIENT
Start: 2019-07-17 | End: 2019-08-22

## 2019-07-17 RX ORDER — HYDROCODONE BITARTRATE AND ACETAMINOPHEN 5; 325 MG/1; MG/1
1 TABLET ORAL EVERY 6 HOURS PRN
Qty: 20 TABLET | Refills: 0 | Status: SHIPPED | OUTPATIENT
Start: 2019-07-17 | End: 2019-08-22

## 2019-07-17 RX ADMIN — IBUPROFEN 600 MG: 600 TABLET ORAL at 06:07

## 2019-07-17 RX ADMIN — IBUPROFEN 600 MG: 600 TABLET ORAL at 01:07

## 2019-07-17 RX ADMIN — PANTOPRAZOLE SODIUM 40 MG: 40 TABLET, DELAYED RELEASE ORAL at 09:07

## 2019-07-17 RX ADMIN — HEPARIN SODIUM 5000 UNITS: 5000 INJECTION, SOLUTION INTRAVENOUS; SUBCUTANEOUS at 06:07

## 2019-07-17 RX ADMIN — IBUPROFEN 600 MG: 600 TABLET ORAL at 12:07

## 2019-07-17 RX ADMIN — SODIUM CHLORIDE, SODIUM LACTATE, POTASSIUM CHLORIDE, AND CALCIUM CHLORIDE: .6; .31; .03; .02 INJECTION, SOLUTION INTRAVENOUS at 01:07

## 2019-07-17 NOTE — PROGRESS NOTES
Progress Note  Gynecology    Admit Date: 2019  LOS: 0    Reason for Admission:  <principal problem not specified>    SUBJECTIVE:     Sosa Viramontes is a 68 y.o.  who is POD#0 s/p RA-diagnostic laparoscopy, mid urethral sling, anterior and posterior repair for the treatment of pelvic organ prolapse.  Pt doing well this morning. Pain is well controlled. She is tolerating a regular diet without N/V. Ambulating without difficulty. Voiding without difficulty via edward. Not yet passing flatus. She is not yet having bowel movements.    OBJECTIVE:     Vital Signs   Temp:  [97.2 °F (36.2 °C)-98.4 °F (36.9 °C)] 98.4 °F (36.9 °C)  Pulse:  [63-99] 66  Resp:  [18-20] 18  SpO2:  [93 %-97 %] 94 %  BP: (100-154)/(51-65) 108/53      Intake/Output Summary (Last 24 hours) at 2019 0701  Last data filed at 2019 0500  Gross per 24 hour   Intake 3144 ml   Output 2575 ml   Net 569 ml       Physical Exam:  Gen: A&Ox3, NAD  CV: RRR  Pulm: LCTAB, normal respiratory effort  Abd: active bowel sounds, soft, non-distended, non-tender to palpation without rebound or guarding  Inc: laparoscopic port sites clean and dry with bandages over top.   Ext: PPP, no peripheral edema, TEDs/SCDs in place  : Edward in place draining clear yellow urine   Vag: packing removed, 90% saturated with dark blood    Laboratory:  Recent Results (from the past 24 hour(s))   CBC auto differential    Collection Time: 19  5:25 AM   Result Value Ref Range    WBC 10.98 3.90 - 12.70 K/uL    RBC 4.15 4.00 - 5.40 M/uL    Hemoglobin 12.3 12.0 - 16.0 g/dL    Hematocrit 37.4 37.0 - 48.5 %    Mean Corpuscular Volume 90 82 - 98 fL    Mean Corpuscular Hemoglobin 29.6 27.0 - 31.0 pg    Mean Corpuscular Hemoglobin Conc 32.9 32.0 - 36.0 g/dL    RDW 13.5 11.5 - 14.5 %    Platelets 189 150 - 350 K/uL    MPV 11.5 9.2 - 12.9 fL    Immature Granulocytes 0.4 0.0 - 0.5 %    Gran # (ANC) 8.4 (H) 1.8 - 7.7 K/uL    Immature Grans (Abs) 0.04 0.00 - 0.04 K/uL     Lymph # 1.6 1.0 - 4.8 K/uL    Mono # 0.9 0.3 - 1.0 K/uL    Eos # 0.0 0.0 - 0.5 K/uL    Baso # 0.01 0.00 - 0.20 K/uL    nRBC 0 0 /100 WBC    Gran% 76.7 (H) 38.0 - 73.0 %    Lymph% 14.2 (L) 18.0 - 48.0 %    Mono% 8.3 4.0 - 15.0 %    Eosinophil% 0.3 0.0 - 8.0 %    Basophil% 0.1 0.0 - 1.9 %    Differential Method Automated      ASSESSMENT/PLAN:     Active Hospital Problems    Diagnosis  POA    Vaginal vault prolapse [N81.9]  Yes      Resolved Hospital Problems   No resolved problems to display.     Assessment: 68 y.o.  POD#1s/p RA-diagnostic laparoscopy, mid urethral sling, anterior and posterior repair for the treatment of pelvic organ prolapse.    Plan:   1. Post-op   - Routine post-op advances  - Continue PRN pain medications  - D/C edward and perform spontaneous voiding trial  - Encourage ambulation   - DVT ppx with teds/SCDs, and heparin 5000 u tid  - Encourage IS  - CBC WNL this morning  - UOP adequate   - Continue IVF until tolerating regular diet.  - Antiemetics prn nausea/vomiting.    2. GERD  - Pantoprazole inpatient. Can resume home meds on discharge.     Dispo: As patient meets appropriate post-op milestones, plan for discharge to home today after void trial.

## 2019-07-17 NOTE — NURSING
Pickering catheter inserted. Same attached to leg bag. Education done. Pt verbalize understanding.

## 2019-07-17 NOTE — PLAN OF CARE
No DC needs from CM perspective.       07/17/19 1025   Final Note   Assessment Type Final Discharge Note   Anticipated Discharge Disposition Home   Hospital Follow Up  Appt(s) scheduled? Yes   Discharge plans and expectations educations in teach back method with documentation complete? Yes   Right Care Referral Info   Post Acute Recommendation No Care

## 2019-07-17 NOTE — DISCHARGE SUMMARY
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       Procedures:   Procedure(s) (LRB):  COLPORRHAPHY, COMBINED ANTEROPOSTERIOR (N/A)  SLING, MIDURETHRAL (N/A)  CYSTOSCOPY (N/A)  XI ROBOTIC LYSIS, ADHESIONS (N/A)    Discharged Condition: good    Hospital Course:   Sosa Viramontes is a 68 y.o. y.o.  female who presented on 2019 for the above-listed procedures for the treatment of POP. PMH is significant for reflux. Patient tolerated the procedure well and was admitted for post-operative care. Patient failed spontaneous void trial with 400mL voided and 545 mL on post-void bladder scan. Edward catheter was therefore replaced. Post-operative course was otherwise uncomplicated.  On day of discharge (POD#1), patient was in stable condition, having met all post-operative milestones. She was urinating via edward catheter, ambulating, and tolerating a regular diet without nausea/vomiting. Pain was well-controlled on oral medication. She was discharged with medications and follow up as listed below.     Consults: None    Significant Diagnostic Studies:  Recent Labs   Lab 19  0525   WBC 10.98   HGB 12.3   HCT 37.4   MCV 90           Treatments:   1. Surgery as above    Disposition: Home or Self Care    Patient Instructions:   Current Discharge Medication List      START taking these medications    Details   docusate sodium (COLACE) 100 MG capsule Take 1 capsule (100 mg total) by mouth 2 (two) times daily.  Qty: 60 capsule, Refills: 1      HYDROcodone-acetaminophen (NORCO) 5-325 mg per tablet Take 1 tablet by mouth every 6 (six)  hours as needed for Pain.  Qty: 20 tablet, Refills: 0      ibuprofen (ADVIL,MOTRIN) 600 MG tablet Take 1 tablet (600 mg total) by mouth every 6 (six) hours as needed for Pain.  Qty: 60 tablet, Refills: 0      nitrofurantoin, macrocrystal-monohydrate, (MACROBID) 100 MG capsule Take 1 capsule (100 mg total) by mouth once daily. for 4 doses  Qty: 4 capsule, Refills: 0      phenazopyridine (PYRIDIUM) 200 MG tablet Take 1 tablet (200 mg total) by mouth 3 (three) times daily as needed for Pain.  Qty: 10 tablet, Refills: 0         CONTINUE these medications which have NOT CHANGED    Details   APRISO 0.375 gram Cp24 TK 4 CS PO QAM  Refills: 11      omeprazole (PRILOSEC) 40 MG capsule TK 1 C PO D  Refills: 3      ranitidine (ZANTAC) 300 MG tablet TK 1 T PO HS  WITH THE DAYAN MEAL  Refills: 3      traZODone (DESYREL) 100 MG tablet TK 1 T PO HS  Refills: 2             Discharge Procedure Orders   No driving until:   Scheduling Instructions: Pain is well controlled without narcotic medication.     Lifting restrictions   Scheduling Instructions: Do not lift anything heavier than a gallon of milk for 2 weeks.  No heavy lifting for 6 weeks     Notify your health care provider if you experience any of the following:  temperature >100.4     Notify your health care provider if you experience any of the following:  persistent nausea and vomiting or diarrhea     Notify your health care provider if you experience any of the following:  severe uncontrolled pain     Notify your health care provider if you experience any of the following:  redness, tenderness, or signs of infection (pain, swelling, redness, odor or green/yellow discharge around incision site)     Notify your health care provider if you experience any of the following:   Scheduling Instructions: Heavy vaginal bleeding, soaking a maxi pad an hour for 2 hr consecutively.     No dressing needed   Scheduling Instructions: Remove bandages and White surgical Steri-Strips 5 days  after surgery.  Shower daily, wash abdomen and vulva with warm soapy water, and pat dry with a clean towel     Activity as tolerated   Scheduling Instructions: Nothing in the vagina for 6 weeks - No douching, tampons, or sex.     Do not strain to have a bowel movement, pass gas, or urinate.     Follow Up Information:     Void trial scheduled for this Friday, 7/19, at 10:30 AM in [address].    Follow-up Information     Reilly Casanova MD.    Specialty:  Internal Medicine  Contact information:  [address] [address]  [phone]             Jarod Zapien MD. Go in 6 weeks.    Specialties:  Gynecology, Urology  Why:  Post-Op  Contact information:  [address] [facility] [address]  [phone]                   Khari Martinez M.D.  Obstetrics & Gynecology  PGY-3

## 2019-07-17 NOTE — PLAN OF CARE
Problem: Adult Inpatient Plan of Care  Goal: Plan of Care Review  Outcome: Ongoing (interventions implemented as appropriate)  AAOX4. VSS ON RA. No falls, injuries, or skin breakdowns. Lap sites x 5 CDI w/ bandaids and steri-strips. Pickering to gravity draining clear yellow urine. IV fluids infusing per MD order. nikki well. Fluids encouraged. Reg diet nikki well. .Pt repositions self independently. Purposeful hourly rounding. Pt has call light in reach, side rails up X2, bed in low position, SCDs/TEDs and nonskid socks on. Pt lying in bed in no distress with friend at the bedside.       The patient has pain and tenderness in left perineum. No  Identifiable trauma, although patient has physicallly active job. Recommend heat, meloxicam and tylenol prn. Try to avoid strenuous activity

## 2019-07-19 ENCOUNTER — OFFICE VISIT (OUTPATIENT)
Dept: UROGYNECOLOGY | Facility: CLINIC | Age: 69
End: 2019-07-19
Payer: MEDICARE

## 2019-07-19 VITALS
SYSTOLIC BLOOD PRESSURE: 140 MMHG | HEIGHT: 69 IN | BODY MASS INDEX: 28.24 KG/M2 | WEIGHT: 190.69 LBS | DIASTOLIC BLOOD PRESSURE: 64 MMHG

## 2019-07-19 DIAGNOSIS — R33.9 INCOMPLETE EMPTYING OF BLADDER: Primary | ICD-10-CM

## 2019-07-19 PROBLEM — N81.11 CYSTOCELE, MIDLINE: Status: RESOLVED | Noted: 2019-04-25 | Resolved: 2019-07-19

## 2019-07-19 PROBLEM — N81.6 RECTOCELE, FEMALE: Status: RESOLVED | Noted: 2019-04-25 | Resolved: 2019-07-19

## 2019-07-19 PROBLEM — N81.9 VAGINAL VAULT PROLAPSE: Status: RESOLVED | Noted: 2019-04-25 | Resolved: 2019-07-19

## 2019-07-19 PROCEDURE — 99024 PR POST-OP FOLLOW-UP VISIT: ICD-10-PCS | Mod: S$GLB,,, | Performed by: NURSE PRACTITIONER

## 2019-07-19 PROCEDURE — 87077 CULTURE AEROBIC IDENTIFY: CPT

## 2019-07-19 PROCEDURE — 99999 PR PBB SHADOW E&M-EST. PATIENT-LVL III: ICD-10-PCS | Mod: PBBFAC,,, | Performed by: NURSE PRACTITIONER

## 2019-07-19 PROCEDURE — 87086 URINE CULTURE/COLONY COUNT: CPT

## 2019-07-19 PROCEDURE — 87186 SC STD MICRODIL/AGAR DIL: CPT

## 2019-07-19 PROCEDURE — 87088 URINE BACTERIA CULTURE: CPT

## 2019-07-19 PROCEDURE — 99024 POSTOP FOLLOW-UP VISIT: CPT | Mod: S$GLB,,, | Performed by: NURSE PRACTITIONER

## 2019-07-19 PROCEDURE — 99999 PR PBB SHADOW E&M-EST. PATIENT-LVL III: CPT | Mod: PBBFAC,,, | Performed by: NURSE PRACTITIONER

## 2019-07-19 NOTE — PROGRESS NOTES
The patient was placed in dorsal lithotomy position with feet in stirrups.  The bladder was filled with 240 mL sterile water to gravity with a 60 mL edward tipped syringe, at which point she voiced a strong desire to void.  The catheter was removed. She voided 300 mL.  She tolerated the procedure well.    ANU SalamancaP-BC

## 2019-07-21 LAB — BACTERIA UR CULT: ABNORMAL

## 2019-07-22 ENCOUNTER — PATIENT MESSAGE (OUTPATIENT)
Dept: UROGYNECOLOGY | Facility: CLINIC | Age: 69
End: 2019-07-22

## 2019-07-22 DIAGNOSIS — N30.00 ACUTE CYSTITIS WITHOUT HEMATURIA: Primary | ICD-10-CM

## 2019-07-22 RX ORDER — SULFAMETHOXAZOLE AND TRIMETHOPRIM 800; 160 MG/1; MG/1
1 TABLET ORAL 2 TIMES DAILY
Qty: 6 TABLET | Refills: 0 | Status: SHIPPED | OUTPATIENT
Start: 2019-07-22 | End: 2019-07-25

## 2019-08-21 ENCOUNTER — TELEPHONE (OUTPATIENT)
Dept: UROGYNECOLOGY | Facility: CLINIC | Age: 69
End: 2019-08-21

## 2019-08-21 NOTE — TELEPHONE ENCOUNTER
Spoke to pt and scheduled pt appointment with VERONICA Tejeda on 8/22 and informed her I'd send an appointment letter in the mail. Pt voiced understanding and call ended.

## 2019-08-21 NOTE — TELEPHONE ENCOUNTER
----- Message from Jyoti Doyle sent at 8/21/2019  2:47 PM CDT -----  Contact: STAR BENTON [[id number]]  Type:  Patient Returning Call    Who Called: Justine Boone MA        Who Left Message for Patient: Justine Boone MA        Does the patient know what this is regarding?appointment    Best Call Back Number:[phone]    Additional Information:

## 2019-08-22 ENCOUNTER — OFFICE VISIT (OUTPATIENT)
Dept: UROGYNECOLOGY | Facility: CLINIC | Age: 69
End: 2019-08-22
Payer: MEDICARE

## 2019-08-22 ENCOUNTER — TELEPHONE (OUTPATIENT)
Dept: UROGYNECOLOGY | Facility: CLINIC | Age: 69
End: 2019-08-22

## 2019-08-22 VITALS
SYSTOLIC BLOOD PRESSURE: 120 MMHG | DIASTOLIC BLOOD PRESSURE: 60 MMHG | BODY MASS INDEX: 28.57 KG/M2 | HEIGHT: 69 IN | WEIGHT: 192.88 LBS

## 2019-08-22 DIAGNOSIS — K59.09 CHRONIC CONSTIPATION: ICD-10-CM

## 2019-08-22 DIAGNOSIS — R30.0 DYSURIA: ICD-10-CM

## 2019-08-22 DIAGNOSIS — R19.8 STRAINING DURING BOWEL MOVEMENTS: ICD-10-CM

## 2019-08-22 DIAGNOSIS — N95.2 VAGINAL ATROPHY: ICD-10-CM

## 2019-08-22 DIAGNOSIS — Z98.890 POST-OPERATIVE STATE: Primary | ICD-10-CM

## 2019-08-22 PROCEDURE — 99024 POSTOP FOLLOW-UP VISIT: CPT | Mod: S$GLB,,, | Performed by: NURSE PRACTITIONER

## 2019-08-22 PROCEDURE — 87077 CULTURE AEROBIC IDENTIFY: CPT

## 2019-08-22 PROCEDURE — 87088 URINE BACTERIA CULTURE: CPT

## 2019-08-22 PROCEDURE — 87186 SC STD MICRODIL/AGAR DIL: CPT

## 2019-08-22 PROCEDURE — 99999 PR PBB SHADOW E&M-EST. PATIENT-LVL III: CPT | Mod: PBBFAC,,, | Performed by: NURSE PRACTITIONER

## 2019-08-22 PROCEDURE — 99024 PR POST-OP FOLLOW-UP VISIT: ICD-10-PCS | Mod: S$GLB,,, | Performed by: NURSE PRACTITIONER

## 2019-08-22 PROCEDURE — 99999 PR PBB SHADOW E&M-EST. PATIENT-LVL III: ICD-10-PCS | Mod: PBBFAC,,, | Performed by: NURSE PRACTITIONER

## 2019-08-22 PROCEDURE — 87086 URINE CULTURE/COLONY COUNT: CPT

## 2019-08-22 NOTE — PATIENT INSTRUCTIONS
1. Postop state:healing well.  Continue post op instructions x 2 weeks.  2. Restart vaginal estrogen cream-- dime size amount in vagina and around opening.   3. Constipation:  a. Hydrate well  b. Continue fiber 2 tsps 2x/day.  May increase to 3 tsps 2x/day.   c. Hold stool softener for now.   d. Continue magnesium oxide 400 mg daily.   e. Continue daily probiotics.   f. For bad day, take miralax or milk of magnesia.   See pelvic floor PT to work on correct defecation. Dynamic PT: Becky Steven [address] [facility] [address], [place] [address] p [phone] f [phone]     4. She will follow up with us in 4 months.

## 2019-08-22 NOTE — PROGRESS NOTES
Urogyn follow up  08/22/2019    [facility] UROGYN Former[facility] 4   [address] [address]    Sosa Viramontes  [id number]  1950      Sosa Viramontes is a 68 y.o. here for a urogyn post op visit.  7/16/19:  Title of Operation:  1)  Robotic-assisted laparoscopic lysis of adhesions (modifier 22 for complexity of dissection, >45 minutes added to case time, and need to abort laparoscopic approach due to significant volume/density)  2)  Anterior repair  3)  Placement of transobturator tension-free midurethral sling, Obtryx II ([employer])  4)  Posterior repair with perineorrhaphy  5)  Cystourethroscopy     Indications for Surgery:  1)  UI:  (--) LIZETH . (--) UUI.  Does have some extreme urgency and trouble holding when she gets to toilet.   (--) pads. Daytime frequency: Q 1 hours.  Nocturia: Yes: 2/night.   (--) dysuria,  (--) hematuria,  (--) frequent UTIs.  (--) complete bladder emptying. DV/PV to help with mod-large 2nd volume.      2)  POP:  Present. below introitus x years, worsening x few years.  Symptoms:(+)  Discomfort, heaviness, incomplete bladder/bowel evacuation.  Tried pessary--doesn't stay in place.   (--) vaginal bleeding. (+) vaginal discharge/itching. (--) sexually active.  (--) dyspareunia.  (+)  Vaginal dryness.  (+) vaginal estrogen use-started 2 months ago, not using regularly.      3)  BM:  (+) constipation/straining. Has microscopic colitis +polyps--started metamucil 2 T in 1 cup of water. Takes miralax when gets constipated. No stool softener.  Sees GI MD.  (--) chronic diarrhea. (--) hematochezia.  (--) fecal incontinence.  (--) fecal smearing/urgency.  (--) complete evacuation.  Rocks back and forth to evacuate        07/11/2019  Suds  1.  VOIDING PHASE:       a.  Uroflowmetry:  · Prolapse reduction: No  · Voided volume:  441 mL   · Voiding time:   230 seconds  · Max flow:  16 mL/s  · Avg flow:   2 mL/s   · PVR:   125 mL     The overall configuration of  this uroflow study was prolonged with elevated PVR.       b.  Pressure flow:  · Prolapse reduction: Yes (pessary)  · Patient was unable to void on toilet,  Catheters were removed, and patient allowed to void on toilet, with 1000 mL produced.  · PVR (calculated):  0 mL     PF unable to be performed, but patient was able to void on toilet with 0 calculated PVR.       2.  FILLING PHASE:  · 1st desire: 41 mL  · Normal desire:  418 mL  · Strong desire:  723 mL  · Urgency:  750 mL  · Compliance (calculated)  ~1000 mL/cm H20  · EMG activity during filling:  With gradual increase.   · Detrusor contractions observed: No.      3.  URETHRAL FUNCTION/STORAGE PHASE:     a.  WITH prolapse reduction:  · CLPP (150 mL): Negative  at  177 cm H20  · VLPP (150 mL): Negative  at  92 cm H20   · CLPP (300 mL): Positive  at  207 cm H20  · VLPP (300 mL): Positive  at  101 cm H20   · CLPP (450 mL): Positive  at  210 cm H20  · VLPP (450 mL): Positive  at  109 cm H20   · CLPP (600 mL): Positive  at  193 cm H20  · VLPP (600 mL): Positive  at  101 cm H20      These findings are consistent with Positive urodynamic stress incontinence.  Assessment:  UF prolonged with elevated PVR.  PF unable to complete but able to void on toilet with normal PVR.  Compliance normal.  Max capacity 1000 mL.  DO (--).  LIZETH (+).     Cysto  Findings: Urethroscopy:  Normal.  Cystoscopy:  Normal bladder mucosa, bilateral ureteral flow was noted.   Assessment: Essentially normal cystourethroscopy     Preoperative Diagnosis:  1. Incomplete emptying of bladder    2. Cystocele, midline    3. Rectocele, female    4. Vaginal vault prolapse    5. Urination frequency    6. Chronic constipation    7. Incomplete defecation    8. Vaginal atrophy    9. Nocturia more than twice per night    10.  Urodynamic stress incontinence  11.  Concern for voiding dysfunction     Postoperative Diagnosis:  1. Incomplete emptying of bladder    2. Cystocele, midline    3. Rectocele, female    4.  "Vaginal vault prolapse    5. Urination frequency    6. Chronic constipation    7. Incomplete defecation    8. Vaginal atrophy    9. Nocturia more than twice per night    10.  Urodynamic stress incontinence  11.  Concern for voiding dysfunction    Issues include:  Patient Active Problem List   Diagnosis    Urination frequency    Chronic constipation    Incomplete defecation    Vaginal atrophy    Nocturia more than twice per night    S/P RA-diagnostic laparoscopy, A&P repair, MUS, and cystoscopy       History since last visit: No major VB, Discharge, pain.   Bladder issues: Nocturia: 1-2x/PM; no UI; freq: Q3 hours; +complete emptying; having some mild dysuria since last PM  Bowel issues: having some straining with BMs; can do small coughs to help expel; stool is looser currently (probably eating high fat recently); taking Metamucil BID--usually ok; has been constipated 2x since surgery--took MOM, which helped; +complete defecation    Medications:    Current Outpatient Medications:     APRISO 0.375 gram Cp24, TK 4 CS PO QAM, Disp: , Rfl: 11    docusate sodium (COLACE) 100 MG capsule, Take 1 capsule (100 mg total) by mouth 2 (two) times daily., Disp: 60 capsule, Rfl: 1    omeprazole (PRILOSEC) 40 MG capsule, TK 1 C PO D, Disp: , Rfl: 3    ranitidine (ZANTAC) 300 MG tablet, TK 1 T PO HS  WITH THE ADYAN MEAL, Disp: , Rfl: 3    traZODone (DESYREL) 100 MG tablet, TK 1 T PO HS, Disp: , Rfl: 2      ROS:  As per HPI.      Exam  /60 (BP Location: Right arm, Patient Position: Sitting, BP Method: Medium (Manual))   Ht 5' 9" (1.753 m)   Wt 87.5 kg (192 lb 14.4 oz)   BMI 28.49 kg/m²   General: alert and oriented, no acute distress  Respiratory: normal respiratory effort  Abd: soft, non-tender, non-distended    Pelvic  Ext. Genitalia: normal external genitalia. Normal bartholin's and skeens glands  Vagina: + atrophy. Normal vaginal mucosa without lesions. No discharge noted.   Incisions healing well.  Sutures still " intact.  Sling path nontender; no mesh visible/ palpable. Non-tender bladder base without palpable mass.  Cervix: absent  Uterus:  absent   Urethra: no masses or tenderness  Urethral meatus: no lesions, caruncle or prolapse.    No obvious prolapse.    Dr. Zapien present during exam    Impression  1. Post-operative state     2. Dysuria  Urine culture   3. Chronic constipation  Ambulatory consult to Physical Therapy   4. Straining during bowel movements  Ambulatory consult to Physical Therapy   5. Vaginal atrophy       We reviewed the above issues and discussed options for short-term versus long-term management of her problems.   Plan:   1. Postop state:healing well.  Continue post op instructions x 2 weeks.  2. Restart vaginal estrogen cream-- dime size amount in vagina and around opening.   3. Constipation:  a. Hydrate well  b. Continue fiber 2 tsps 2x/day.  May increase to 3 tsps 2x/day.   c. Hold stool softener for now.   d. Continue magnesium oxide 400 mg daily.   e. Continue daily probiotics.   f. For bad day, take miralax or milk of magnesia.   See pelvic floor PT to work on correct defecation. Dynamic PT: Becky Steven [address] [place] [address], [place] [address] p [phone] f [phone]     4. She will follow up with us in 4 months.        30 minutes were spent in face to face time with this patient  90 % of this time was spent in counseling and/or coordination of care     Haydee Tejeda NP  [facility]  Division of Female Pelvic Medicine and Reconstructive Surgery  Department of Obstetrics & Gynecology

## 2019-08-22 NOTE — TELEPHONE ENCOUNTER
----- Message from Merissa Atif sent at 8/22/2019  3:01 PM CDT -----  Contact: STAR BENTON   Name of Who is Calling: STAR BENTON       What is the request in detail: Patient states that she is in traffic and is in route to her 3:00 o'clock appointment.        Can the clinic reply by [facility]: No      What Number to Call Back if not in [facility]:  [phone]

## 2019-08-26 ENCOUNTER — TELEPHONE (OUTPATIENT)
Dept: UROGYNECOLOGY | Facility: CLINIC | Age: 69
End: 2019-08-26

## 2019-08-26 DIAGNOSIS — N30.00 ACUTE CYSTITIS WITHOUT HEMATURIA: Primary | ICD-10-CM

## 2019-08-26 LAB — BACTERIA UR CULT: ABNORMAL

## 2019-08-26 RX ORDER — NITROFURANTOIN 25; 75 MG/1; MG/1
100 CAPSULE ORAL 2 TIMES DAILY
Qty: 14 CAPSULE | Refills: 0 | Status: SHIPPED | OUTPATIENT
Start: 2019-08-26 | End: 2020-04-22

## 2019-08-26 NOTE — TELEPHONE ENCOUNTER
Pt's UC results are positive for Ecoli. Informed pt an Rx will be sent by the end of the day. Pt voiced understanding and call ended.     Component 4d ago   Urine Culture, Routine Abnormal    ESCHERICHIA COLI   >100,000 cfu/ml     Resulting Agency [facility]   Susceptibility      Escherichia coli     CULTURE, URINE     Amox/K Clav'ate <=8/4 mcg/mL Sensitive     Amp/Sulbactam <=8/4 mcg/mL Sensitive     Ampicillin <=8 mcg/mL Sensitive     Cefazolin <=8 mcg/mL Sensitive     Cefepime <=8 mcg/mL Sensitive     Ceftriaxone <=8 mcg/mL Sensitive     Ciprofloxacin <=1 mcg/mL Sensitive     Gentamicin <=4 mcg/mL Sensitive     Levofloxacin <=2 mcg/mL Sensitive     Nitrofurantoin <=32 mcg/mL Sensitive     Piperacillin/Tazo <=16 mcg/mL Sensitive     Tetracycline <=4 mcg/mL Sensitive     Tobramycin <=4 mcg/mL Sensitive     Trimeth/Sulfa <=2/38 mcg/mL Sensitive

## 2019-08-26 NOTE — TELEPHONE ENCOUNTER
----- Message from Sandy Romero sent at 8/26/2019  3:07 PM CDT -----  Contact: Self            Name of Who is Calling: STAR BENTON [[id number]]      What is the request in detail: Pt states she received her urine results on the portal and it indicates that she has an infection. Pt wants to know if a medication will be sent to the pharmacy. If so, please send to [facility] at [phone].       Can the clinic reply by [facility]: Y      What [facility] Back [facility]: [phone]

## 2019-12-05 ENCOUNTER — OFFICE VISIT (OUTPATIENT)
Dept: UROGYNECOLOGY | Facility: CLINIC | Age: 69
End: 2019-12-05
Payer: MEDICARE

## 2019-12-05 VITALS
DIASTOLIC BLOOD PRESSURE: 80 MMHG | WEIGHT: 197.06 LBS | HEIGHT: 69 IN | BODY MASS INDEX: 29.19 KG/M2 | SYSTOLIC BLOOD PRESSURE: 140 MMHG

## 2019-12-05 DIAGNOSIS — R35.1 NOCTURIA MORE THAN TWICE PER NIGHT: ICD-10-CM

## 2019-12-05 DIAGNOSIS — N39.43 POST-VOID DRIBBLING: ICD-10-CM

## 2019-12-05 DIAGNOSIS — R35.0 INCREASED FREQUENCY OF URINATION: ICD-10-CM

## 2019-12-05 DIAGNOSIS — N95.2 VAGINAL ATROPHY: Primary | ICD-10-CM

## 2019-12-05 DIAGNOSIS — K59.09 CHRONIC CONSTIPATION: ICD-10-CM

## 2019-12-05 PROCEDURE — 99214 OFFICE O/P EST MOD 30 MIN: CPT | Mod: S$GLB,,, | Performed by: OBSTETRICS & GYNECOLOGY

## 2019-12-05 PROCEDURE — 1159F MED LIST DOCD IN RCRD: CPT | Mod: S$GLB,,, | Performed by: OBSTETRICS & GYNECOLOGY

## 2019-12-05 PROCEDURE — 99999 PR PBB SHADOW E&M-EST. PATIENT-LVL III: CPT | Mod: PBBFAC,,, | Performed by: OBSTETRICS & GYNECOLOGY

## 2019-12-05 PROCEDURE — 1159F PR MEDICATION LIST DOCUMENTED IN MEDICAL RECORD: ICD-10-PCS | Mod: S$GLB,,, | Performed by: OBSTETRICS & GYNECOLOGY

## 2019-12-05 PROCEDURE — 87086 URINE CULTURE/COLONY COUNT: CPT

## 2019-12-05 PROCEDURE — 1126F PR PAIN SEVERITY QUANTIFIED, NO PAIN PRESENT: ICD-10-PCS | Mod: S$GLB,,, | Performed by: OBSTETRICS & GYNECOLOGY

## 2019-12-05 PROCEDURE — 3288F PR FALLS RISK ASSESSMENT DOCUMENTED: ICD-10-PCS | Mod: CPTII,S$GLB,, | Performed by: OBSTETRICS & GYNECOLOGY

## 2019-12-05 PROCEDURE — 3288F FALL RISK ASSESSMENT DOCD: CPT | Mod: CPTII,S$GLB,, | Performed by: OBSTETRICS & GYNECOLOGY

## 2019-12-05 PROCEDURE — 1100F PTFALLS ASSESS-DOCD GE2>/YR: CPT | Mod: CPTII,S$GLB,, | Performed by: OBSTETRICS & GYNECOLOGY

## 2019-12-05 PROCEDURE — 1126F AMNT PAIN NOTED NONE PRSNT: CPT | Mod: S$GLB,,, | Performed by: OBSTETRICS & GYNECOLOGY

## 2019-12-05 PROCEDURE — 99999 PR PBB SHADOW E&M-EST. PATIENT-LVL III: ICD-10-PCS | Mod: PBBFAC,,, | Performed by: OBSTETRICS & GYNECOLOGY

## 2019-12-05 PROCEDURE — 1100F PR PT FALLS ASSESS DOC 2+ FALLS/FALL W/INJURY/YR: ICD-10-PCS | Mod: CPTII,S$GLB,, | Performed by: OBSTETRICS & GYNECOLOGY

## 2019-12-05 PROCEDURE — 99214 PR OFFICE/OUTPT VISIT, EST, LEVL IV, 30-39 MIN: ICD-10-PCS | Mod: S$GLB,,, | Performed by: OBSTETRICS & GYNECOLOGY

## 2019-12-05 RX ORDER — ESTRADIOL 0.1 MG/G
0.5 CREAM VAGINAL DAILY
Qty: 15 G | Refills: 11 | Status: SHIPPED | OUTPATIENT
Start: 2019-12-05 | End: 2020-06-29 | Stop reason: SDUPTHER

## 2019-12-05 RX ORDER — FAMOTIDINE 20 MG/1
TABLET, FILM COATED ORAL
Refills: 3 | COMMUNITY
Start: 2019-10-14 | End: 2021-02-10

## 2019-12-05 RX ORDER — OXYBUTYNIN CHLORIDE 5 MG/1
TABLET ORAL
Qty: 90 TABLET | Refills: 11 | Status: SHIPPED | OUTPATIENT
Start: 2019-12-05 | End: 2020-06-29

## 2019-12-05 NOTE — PROGRESS NOTES
Urogyn follow up  12/09/2019    [facility] UROGYN [facility] 4   [address] [address]    Sosa Viramontes  [id number]  1950    Sosa Viramontes is a 68 y.o. here for a urogyn post op visit.  7/16/19:  Title of Operation:  1)  Robotic-assisted laparoscopic lysis of adhesions (modifier 22 for complexity of dissection, >45 minutes added to case time, and need to abort laparoscopic approach due to significant volume/density)  2)  Anterior repair  3)  Placement of transobturator tension-free midurethral sling, Obtryx II (Mobibeam)  4)  Posterior repair with perineorrhaphy  5)  Cystourethroscopy     Indications for Surgery:  1)  UI:  (--) LIZETH . (--) UUI.  Does have some extreme urgency and trouble holding when she gets to toilet.   (--) pads. Daytime frequency: Q 1 hours.  Nocturia: Yes: 2/night.   (--) dysuria,  (--) hematuria,  (--) frequent UTIs.  (--) complete bladder emptying. DV/PV to help with mod-large 2nd volume.      2)  POP:  Present. below introitus x years, worsening x few years.  Symptoms:(+)  Discomfort, heaviness, incomplete bladder/bowel evacuation.  Tried pessary--doesn't stay in place.   (--) vaginal bleeding. (+) vaginal discharge/itching. (--) sexually active.  (--) dyspareunia.  (+)  Vaginal dryness.  (+) vaginal estrogen use-started 2 months ago, not using regularly.      3)  BM:  (+) constipation/straining. Has microscopic colitis +polyps--started metamucil 2 T in 1 cup of water. Takes miralax when gets constipated. No stool softener.  Sees GI MD.  (--) chronic diarrhea. (--) hematochezia.  (--) fecal incontinence.  (--) fecal smearing/urgency.  (--) complete evacuation.  Rocks back and forth to evacuate     07/11/2019  Suds  1.  VOIDING PHASE:       a.  Uroflowmetry:  · Prolapse reduction: No  · Voided volume:  441 mL   · Voiding time:   230 seconds  · Max flow:  16 mL/s  · Avg flow:   2 mL/s   · PVR:   125 mL     The overall configuration of this  uroflow study was prolonged with elevated PVR.       b.  Pressure flow:  · Prolapse reduction: Yes (pessary)  · Patient was unable to void on toilet,  Catheters were removed, and patient allowed to void on toilet, with 1000 mL produced.  · PVR (calculated):  0 mL     PF unable to be performed, but patient was able to void on toilet with 0 calculated PVR.       2.  FILLING PHASE:  · 1st desire: 41 mL  · Normal desire:  418 mL  · Strong desire:  723 mL  · Urgency:  750 mL  · Compliance (calculated)  ~1000 mL/cm H20  · EMG activity during filling:  With gradual increase.   · Detrusor contractions observed: No.      3.  URETHRAL FUNCTION/STORAGE PHASE:     a.  WITH prolapse reduction:  · CLPP (150 mL): Negative  at  177 cm H20  · VLPP (150 mL): Negative  at  92 cm H20   · CLPP (300 mL): Positive  at  207 cm H20  · VLPP (300 mL): Positive  at  101 cm H20   · CLPP (450 mL): Positive  at  210 cm H20  · VLPP (450 mL): Positive  at  109 cm H20   · CLPP (600 mL): Positive  at  193 cm H20  · VLPP (600 mL): Positive  at  101 cm H20      These findings are consistent with Positive urodynamic stress incontinence.  Assessment:  UF prolonged with elevated PVR.  PF unable to complete but able to void on toilet with normal PVR.  Compliance normal.  Max capacity 1000 mL.  DO (--).  LIZETH (+).     Cysto  Findings: Urethroscopy:  Normal.  Cystoscopy:  Normal bladder mucosa, bilateral ureteral flow was noted.   Assessment: Essentially normal cystourethroscopy     Preoperative Diagnosis:  1. Incomplete emptying of bladder    2. Cystocele, midline    3. Rectocele, female    4. Vaginal vault prolapse    5. Urination frequency    6. Chronic constipation    7. Incomplete defecation    8. Vaginal atrophy    9. Nocturia more than twice per night    10.  Urodynamic stress incontinence  11.  Concern for voiding dysfunction     Postoperative Diagnosis:  1. Incomplete emptying of bladder    2. Cystocele, midline    3. Rectocele, female    4. Vaginal  vault prolapse    5. Urination frequency    6. Chronic constipation    7. Incomplete defecation    8. Vaginal atrophy    9. Nocturia more than twice per night    10.  Urodynamic stress incontinence  11.  Concern for voiding dysfunction    Issues include:  Patient Active Problem List   Diagnosis    Increased frequency of urination    Chronic constipation    Incomplete defecation    Vaginal atrophy    Nocturia more than twice per night    S/P RA-diagnostic laparoscopy, A&P repair, MUS, and cystoscopy    Post-void dribbling       History since last visit: No major VB, discharge, pain. Feels like there is some bulge inside vagina, mostly when she applies estrogen with finger--difficult to find vaginal opening. No s/sx POP. Still occasional twinges at umbilical area.  Feels weight in abdominal area.  Has had 10 lb weight gain.  Using estrogen 2x/week.   Bladder issues: Nocturia: 1-2x/PM (was same preop); no UI; freq: Qh (was same preop); +complete emptying--has to DV to help sometimes; does have some new PV dribbling; has been trying to do Kegels  Bowel issues: still having some straining with BMs, improved with 3 tsps of metamucil + mag oxide daily.   If is straining a lot, stops and comes back.  Baseline constipation, improved since surgery. Hydrating well.     Medications:    Current Outpatient Medications:     APRISO 0.375 gram Cp24, TK 4 CS PO QAM, Disp: , Rfl: 11    famotidine (PEPCID) 20 MG tablet, TK 1 TO 2 TS PO QHS, Disp: , Rfl: 3    omeprazole (PRILOSEC) 40 MG capsule, TK 1 C PO D, Disp: , Rfl: 3    traZODone (DESYREL) 100 MG tablet, TK 1 T PO HS, Disp: , Rfl: 2    docusate sodium (COLACE) 100 MG capsule, Take 1 capsule (100 mg total) by mouth 2 (two) times daily. (Patient not taking: Reported on 12/5/2019), Disp: 60 capsule, Rfl: 1    estradiol (ESTRACE) 0.01 % (0.1 mg/gram) vaginal cream, Place 0.5 g vaginally once daily., Disp: 15 g, Rfl: 11    nitrofurantoin, macrocrystal-monohydrate,  "(MACROBID) 100 MG capsule, Take 1 capsule (100 mg total) by mouth 2 (two) times daily. (Patient not taking: Reported on 12/5/2019), Disp: 14 capsule, Rfl: 0    oxybutynin (DITROPAN) 5 MG Tab, 5-10 mg PO TID PRN urinary frequency, Disp: 90 tablet, Rfl: 11    ranitidine (ZANTAC) 300 MG tablet, TK 1 T PO HS  WITH THE DAYAN MEAL, Disp: , Rfl: 3      ROS:  As per HPI.      Exam  BP (!) 140/80 (BP Location: Left arm, Patient Position: Sitting, BP Method: Medium (Manual))   Ht 5' 9" (1.753 m)   Wt 89.4 kg (197 lb 1.5 oz)   BMI 29.11 kg/m²   General: alert and oriented, no acute distress  Respiratory: normal respiratory effort  Abd: soft, non-tender, non-distended    Pelvic  Ext. Genitalia: normal external genitalia. Normal bartholin's and skeens glands  Vagina: + atrophy. Normal vaginal mucosa without lesions. No discharge noted.   Incisions healing well.  Sutures still intact.  Sling path nontender; no mesh visible/ palpable. Non-tender bladder base without palpable mass.  Cervix: absent  Uterus:  absent   Urethra: no masses or tenderness  Urethral meatus: no lesions, caruncle or prolapse.    POPQ: Aa/Ba -1, Ap/Bp -1, C -7.  TVL 8.     Dr. Zapien present during exam    Impression  1. Vaginal atrophy  estradiol (ESTRACE) 0.01 % (0.1 mg/gram) vaginal cream   2. Increased frequency of urination  oxybutynin (DITROPAN) 5 MG Tab    Urine culture    Ambulatory consult to Physical Therapy   3. Chronic constipation     4. Nocturia more than twice per night  Ambulatory consult to Physical Therapy   5. Post-void dribbling  Ambulatory consult to Physical Therapy     We reviewed the above issues and discussed options for short-term versus long-term management of her problems.   Plan:   1. Postop state:healed well.   2. Restart vaginal estrogen cream--dime size amount in vagina and around opening.   3. Constipation:  a. Hydrate well  b. Continue fiber 3 tsps/day.  May increase to as much as 6 tsps/day.   c. Hold stool softener for " now.   d. Continue magnesium oxide 400 mg daily.   e. Continue daily probiotics.   f. For bad day, take miralax or milk of magnesia.   See pelvic floor PT to work on correct defecation. Fifi Conner. [facility]ipton Bradshaw/[facility] in [place], [place]:  ([phone].  (f) [phone].   In [place], [place]: (p) [phone].  (f) [phone].    4. Urinary frequency/nocturia/post-void dribbling:  --urine C&S  --work on general fitness/weight control  --Empty bladder every 3 hours.  Empty well: wait a minute, lean forward on toilet.    --Avoid dietary irritants (see sheet).  Keep diary x 3-5 days to determine your irritants.  --start pelvic floor PT.  Call to make appt.   --URGE: Can take oxybutynin 5-10 mg up to 3x/day as needed.  Takes 2-4 weeks to see if will have effect.  For dry mouth: get sour, sugar free lozenge or gum.    5. Nocturia (nighttime urination): stop fluids 2 hours before bed. No water by bed.  If have leg swelling:  Elevate feet above chest x 1 hour before bed to get excess fluid off.  Can also use support hose (knee highs).    6. She will follow up with us in July for 1 year check.     40 minutes were spent in face to face time with this patient  90 % of this time was spent in counseling and/or coordination of care     Jarod Zapien MD  [facility]  Division of Female Pelvic Medicine and Reconstructive Surgery  Department of Obstetrics & Gynecology

## 2019-12-05 NOTE — PATIENT INSTRUCTIONS
Bladder Irritants  Certain foods and drinks have been associated with worsening symptoms of urinary frequency, urgency, urge incontinence, or bladder pain. If you suffer from any of these conditions, you may wish to try eliminating one or more of these foods from your diet and see if your symptoms improve. If bladder symptoms are related to dietary factors, strict adherence to a diet thateliminates the food should bring marked relief in 10 days. Once you are feeling better, you can begin to add foods back into your diet, one at a time. If symptoms return, you will be able to identify the irritant. As you add foods back to your diet it is very important that you drink significant amounts of water.    -----------------------------------------------------------------------------------------------  List of Common Bladder Irritants*  Alcoholic beverages  Apples and apple juice  Cantaloupe  Carbonated beverages  Chili and spicy foods  Chocolate  Citrus fruit  Coffee (including decaffeinated)  Cranberries and cranberry juice  Grapes  Guava  Milk Products: milk, cheese, cottage cheese, yogurt, ice cream  Peaches  Pineapple  Plums  Strawberries  Sugar especially artificial sweeteners, saccharin, aspartame, corn sweeteners, honey, fructose, sucrose, lactose  Tea  Tomatoes and tomato juice  Vitamin B complex  Vinegar  *Most people are not sensitive to ALL of these products; your goal is to find the foods that make YOUR symptoms worse.  ---------------------------------------------------------------------------------------------------    Low-acid fruit substitutions include apricots, papaya, pears and watermelon. Coffee drinkers can drink Kava or other lowacid instant drinks. Tea drinkers can substitute non-citrus herbal and sun brewed teas. Calcium carbonate co-buffered with calcium ascorbate can be substituted for Vitamin C. Prelief is a dietary supplement that works as an acid blocker for the bladder.    Where to get more  information:        Overcoming Bladder Disorders by Melissa Aguiar and Loan Marcus, 1990        You Dont Have to Live with Cystitis! By Leigh Oconnell, 1988  · http://www.urologymanagement.org/oab    -----------------------------------------------------------------------    1. Postop state:healed well.   2. Restart vaginal estrogen cream--dime size amount in vagina and around opening.   3. Constipation:  a. Hydrate well  b. Continue fiber 3 tsps/day.  May increase to as much as 6 tsps/day.   c. Hold stool softener for now.   d. Continue magnesium oxide 400 mg daily.   e. Continue daily probiotics.   f. For bad day, take miralax or milk of magnesia.   See pelvic floor PT to work on correct defecation. Fifi Conner. Benzie Bradshaw/[facility] in [place], [place]:  (p) [phone].  (f) [phone].   In [place]: (p) [phone].  (f) [phone].    4. Urinary frequency/nocturia/post-void dribbling:  --urine C&S  --work on general fitness/weight control  --Empty bladder every 3 hours.  Empty well: wait a minute, lean forward on toilet.    --Avoid dietary irritants (see sheet).  Keep diary x 3-5 days to determine your irritants.  --start pelvic floor PT.  Call to make appt.   --URGE: Can take oxybutynin 5-10 mg up to 3x/day as needed.  Takes 2-4 weeks to see if will have effect.  For dry mouth: get sour, sugar free lozenge or gum.    5. Nocturia (nighttime urination): stop fluids 2 hours before bed. No water by bed.  If have leg swelling:  Elevate feet above chest x 1 hour before bed to get excess fluid off.  Can also use support hose (knee highs).    6. She will follow up with us in July for 1 year check.

## 2019-12-08 LAB
BACTERIA UR CULT: NORMAL
BACTERIA UR CULT: NORMAL

## 2019-12-09 PROBLEM — N39.43 POST-VOID DRIBBLING: Status: ACTIVE | Noted: 2019-12-09

## 2021-01-15 ENCOUNTER — IMMUNIZATION (OUTPATIENT)
Dept: FAMILY MEDICINE | Facility: CLINIC | Age: 71
End: 2021-01-15
Payer: MEDICARE

## 2021-01-15 DIAGNOSIS — Z23 NEED FOR VACCINATION: Primary | ICD-10-CM

## 2021-01-15 PROCEDURE — 91300 COVID-19, MRNA, LNP-S, PF, 30 MCG/0.3 ML DOSE VACCINE: CPT | Mod: PBBFAC,PO

## 2021-02-03 DIAGNOSIS — M25.562 ACUTE PAIN OF LEFT KNEE: Primary | ICD-10-CM

## 2021-02-05 ENCOUNTER — IMMUNIZATION (OUTPATIENT)
Dept: FAMILY MEDICINE | Facility: CLINIC | Age: 71
End: 2021-02-05
Payer: MEDICARE

## 2021-02-05 DIAGNOSIS — Z23 NEED FOR VACCINATION: Primary | ICD-10-CM

## 2021-02-05 PROCEDURE — 0002A COVID-19, MRNA, LNP-S, PF, 30 MCG/0.3 ML DOSE VACCINE: CPT | Mod: PBBFAC,PO

## 2021-02-05 PROCEDURE — 91300 COVID-19, MRNA, LNP-S, PF, 30 MCG/0.3 ML DOSE VACCINE: CPT | Mod: PBBFAC,PO

## 2021-02-10 ENCOUNTER — OFFICE VISIT (OUTPATIENT)
Dept: FAMILY MEDICINE | Facility: CLINIC | Age: 71
End: 2021-02-10
Payer: MEDICARE

## 2021-02-10 VITALS
RESPIRATION RATE: 16 BRPM | SYSTOLIC BLOOD PRESSURE: 147 MMHG | DIASTOLIC BLOOD PRESSURE: 68 MMHG | HEIGHT: 69 IN | OXYGEN SATURATION: 97 % | HEART RATE: 65 BPM | BODY MASS INDEX: 29.77 KG/M2 | TEMPERATURE: 98 F | WEIGHT: 201 LBS

## 2021-02-10 DIAGNOSIS — Z13.6 ENCOUNTER FOR LIPID SCREENING FOR CARDIOVASCULAR DISEASE: ICD-10-CM

## 2021-02-10 DIAGNOSIS — Z12.31 ENCOUNTER FOR SCREENING MAMMOGRAM FOR BREAST CANCER: ICD-10-CM

## 2021-02-10 DIAGNOSIS — R03.0 ELEVATED BLOOD PRESSURE READING WITHOUT DIAGNOSIS OF HYPERTENSION: Primary | ICD-10-CM

## 2021-02-10 DIAGNOSIS — Z76.89 ENCOUNTER TO ESTABLISH CARE WITH NEW DOCTOR: ICD-10-CM

## 2021-02-10 DIAGNOSIS — Z78.0 ASYMPTOMATIC MENOPAUSAL STATE: ICD-10-CM

## 2021-02-10 DIAGNOSIS — I47.10 SVT (SUPRAVENTRICULAR TACHYCARDIA): ICD-10-CM

## 2021-02-10 DIAGNOSIS — Z13.220 ENCOUNTER FOR LIPID SCREENING FOR CARDIOVASCULAR DISEASE: ICD-10-CM

## 2021-02-10 PROCEDURE — 99204 PR OFFICE/OUTPT VISIT, NEW, LEVL IV, 45-59 MIN: ICD-10-PCS | Mod: S$GLB,,, | Performed by: FAMILY MEDICINE

## 2021-02-10 PROCEDURE — 3008F BODY MASS INDEX DOCD: CPT | Mod: CPTII,S$GLB,, | Performed by: FAMILY MEDICINE

## 2021-02-10 PROCEDURE — 1126F AMNT PAIN NOTED NONE PRSNT: CPT | Mod: S$GLB,,, | Performed by: FAMILY MEDICINE

## 2021-02-10 PROCEDURE — 1126F PR PAIN SEVERITY QUANTIFIED, NO PAIN PRESENT: ICD-10-PCS | Mod: S$GLB,,, | Performed by: FAMILY MEDICINE

## 2021-02-10 PROCEDURE — 1101F PT FALLS ASSESS-DOCD LE1/YR: CPT | Mod: CPTII,S$GLB,, | Performed by: FAMILY MEDICINE

## 2021-02-10 PROCEDURE — 3288F PR FALLS RISK ASSESSMENT DOCUMENTED: ICD-10-PCS | Mod: CPTII,S$GLB,, | Performed by: FAMILY MEDICINE

## 2021-02-10 PROCEDURE — 1159F MED LIST DOCD IN RCRD: CPT | Mod: S$GLB,,, | Performed by: FAMILY MEDICINE

## 2021-02-10 PROCEDURE — 1157F ADVNC CARE PLAN IN RCRD: CPT | Mod: S$GLB,,, | Performed by: FAMILY MEDICINE

## 2021-02-10 PROCEDURE — 3288F FALL RISK ASSESSMENT DOCD: CPT | Mod: CPTII,S$GLB,, | Performed by: FAMILY MEDICINE

## 2021-02-10 PROCEDURE — 99204 OFFICE O/P NEW MOD 45 MIN: CPT | Mod: S$GLB,,, | Performed by: FAMILY MEDICINE

## 2021-02-10 PROCEDURE — 1159F PR MEDICATION LIST DOCUMENTED IN MEDICAL RECORD: ICD-10-PCS | Mod: S$GLB,,, | Performed by: FAMILY MEDICINE

## 2021-02-10 PROCEDURE — 3008F PR BODY MASS INDEX (BMI) DOCUMENTED: ICD-10-PCS | Mod: CPTII,S$GLB,, | Performed by: FAMILY MEDICINE

## 2021-02-10 PROCEDURE — 1157F PR ADVANCE CARE PLAN OR EQUIV PRESENT IN MEDICAL RECORD: ICD-10-PCS | Mod: S$GLB,,, | Performed by: FAMILY MEDICINE

## 2021-02-10 PROCEDURE — 1101F PR PT FALLS ASSESS DOC 0-1 FALLS W/OUT INJ PAST YR: ICD-10-PCS | Mod: CPTII,S$GLB,, | Performed by: FAMILY MEDICINE

## 2021-02-10 RX ORDER — CHOLECALCIFEROL (VITAMIN D3) 125 MCG
CAPSULE ORAL
COMMUNITY
End: 2023-11-22

## 2021-02-10 RX ORDER — CLONIDINE HYDROCHLORIDE 0.1 MG/1
TABLET ORAL
Qty: 30 TABLET | Refills: 1 | Status: SHIPPED | OUTPATIENT
Start: 2021-02-10 | End: 2022-10-20

## 2021-02-10 RX ORDER — LANOLIN ALCOHOL/MO/W.PET/CERES
400 CREAM (GRAM) TOPICAL DAILY
COMMUNITY

## 2021-02-11 DIAGNOSIS — Z12.11 COLON CANCER SCREENING: ICD-10-CM

## 2021-02-12 ENCOUNTER — LAB VISIT (OUTPATIENT)
Dept: LAB | Facility: HOSPITAL | Age: 71
End: 2021-02-12
Attending: FAMILY MEDICINE
Payer: MEDICARE

## 2021-02-12 DIAGNOSIS — I47.10 SVT (SUPRAVENTRICULAR TACHYCARDIA): ICD-10-CM

## 2021-02-12 DIAGNOSIS — Z13.220 ENCOUNTER FOR LIPID SCREENING FOR CARDIOVASCULAR DISEASE: ICD-10-CM

## 2021-02-12 DIAGNOSIS — Z13.6 ENCOUNTER FOR LIPID SCREENING FOR CARDIOVASCULAR DISEASE: ICD-10-CM

## 2021-02-12 DIAGNOSIS — R03.0 ELEVATED BLOOD PRESSURE READING WITHOUT DIAGNOSIS OF HYPERTENSION: ICD-10-CM

## 2021-02-12 LAB
ALBUMIN SERPL BCP-MCNC: 4.1 G/DL (ref 3.5–5.2)
ALP SERPL-CCNC: 58 U/L (ref 55–135)
ALT SERPL W/O P-5'-P-CCNC: 28 U/L (ref 10–44)
ANION GAP SERPL CALC-SCNC: 8 MMOL/L (ref 8–16)
AST SERPL-CCNC: 21 U/L (ref 10–40)
BASOPHILS # BLD AUTO: 0.05 K/UL (ref 0–0.2)
BASOPHILS NFR BLD: 0.9 % (ref 0–1.9)
BILIRUB SERPL-MCNC: 0.7 MG/DL (ref 0.1–1)
BUN SERPL-MCNC: 20 MG/DL (ref 8–23)
CALCIUM SERPL-MCNC: 9.2 MG/DL (ref 8.7–10.5)
CHLORIDE SERPL-SCNC: 105 MMOL/L (ref 95–110)
CHOLEST SERPL-MCNC: 197 MG/DL (ref 120–199)
CHOLEST/HDLC SERPL: 5.2 {RATIO} (ref 2–5)
CO2 SERPL-SCNC: 28 MMOL/L (ref 23–29)
CREAT SERPL-MCNC: 0.9 MG/DL (ref 0.5–1.4)
DIFFERENTIAL METHOD: NORMAL
EOSINOPHIL # BLD AUTO: 0.4 K/UL (ref 0–0.5)
EOSINOPHIL NFR BLD: 6.7 % (ref 0–8)
ERYTHROCYTE [DISTWIDTH] IN BLOOD BY AUTOMATED COUNT: 13.3 % (ref 11.5–14.5)
EST. GFR  (AFRICAN AMERICAN): >60 ML/MIN/1.73 M^2
EST. GFR  (NON AFRICAN AMERICAN): >60 ML/MIN/1.73 M^2
GLUCOSE SERPL-MCNC: 99 MG/DL (ref 70–110)
HCT VFR BLD AUTO: 42.9 % (ref 37–48.5)
HDLC SERPL-MCNC: 38 MG/DL (ref 40–75)
HDLC SERPL: 19.3 % (ref 20–50)
HGB BLD-MCNC: 14.2 G/DL (ref 12–16)
IMM GRANULOCYTES # BLD AUTO: 0.01 K/UL (ref 0–0.04)
IMM GRANULOCYTES NFR BLD AUTO: 0.2 % (ref 0–0.5)
LDLC SERPL CALC-MCNC: 137 MG/DL (ref 63–159)
LYMPHOCYTES # BLD AUTO: 1.8 K/UL (ref 1–4.8)
LYMPHOCYTES NFR BLD: 31.3 % (ref 18–48)
MCH RBC QN AUTO: 29.7 PG (ref 27–31)
MCHC RBC AUTO-ENTMCNC: 33.1 G/DL (ref 32–36)
MCV RBC AUTO: 90 FL (ref 82–98)
MONOCYTES # BLD AUTO: 0.5 K/UL (ref 0.3–1)
MONOCYTES NFR BLD: 8.3 % (ref 4–15)
NEUTROPHILS # BLD AUTO: 3 K/UL (ref 1.8–7.7)
NEUTROPHILS NFR BLD: 52.6 % (ref 38–73)
NONHDLC SERPL-MCNC: 159 MG/DL
NRBC BLD-RTO: 0 /100 WBC
PLATELET # BLD AUTO: 231 K/UL (ref 150–350)
PMV BLD AUTO: 10.5 FL (ref 9.2–12.9)
POTASSIUM SERPL-SCNC: 3.8 MMOL/L (ref 3.5–5.1)
PROT SERPL-MCNC: 7.2 G/DL (ref 6–8.4)
RBC # BLD AUTO: 4.78 M/UL (ref 4–5.4)
SODIUM SERPL-SCNC: 141 MMOL/L (ref 136–145)
TRIGL SERPL-MCNC: 110 MG/DL (ref 30–150)
TSH SERPL DL<=0.005 MIU/L-ACNC: 2.09 UIU/ML (ref 0.34–5.6)
WBC # BLD AUTO: 5.69 K/UL (ref 3.9–12.7)

## 2021-02-12 PROCEDURE — 36415 COLL VENOUS BLD VENIPUNCTURE: CPT

## 2021-02-12 PROCEDURE — 80053 COMPREHEN METABOLIC PANEL: CPT

## 2021-02-12 PROCEDURE — 80061 LIPID PANEL: CPT

## 2021-02-12 PROCEDURE — 84443 ASSAY THYROID STIM HORMONE: CPT

## 2021-02-12 PROCEDURE — 85025 COMPLETE CBC W/AUTO DIFF WBC: CPT

## 2021-02-15 ENCOUNTER — HOSPITAL ENCOUNTER (OUTPATIENT)
Dept: RADIOLOGY | Facility: HOSPITAL | Age: 71
Discharge: HOME | End: 2021-02-15
Attending: FAMILY MEDICINE
Payer: MEDICARE

## 2021-02-15 ENCOUNTER — HOSPITAL ENCOUNTER (OUTPATIENT)
Dept: CARDIOLOGY | Facility: HOSPITAL | Age: 71
Discharge: HOME | End: 2021-02-15
Attending: FAMILY MEDICINE
Payer: MEDICARE

## 2021-02-15 VITALS — HEIGHT: 69 IN | WEIGHT: 201.06 LBS | BODY MASS INDEX: 29.78 KG/M2

## 2021-02-15 DIAGNOSIS — Z78.0 ASYMPTOMATIC MENOPAUSAL STATE: ICD-10-CM

## 2021-02-15 DIAGNOSIS — R03.0 ELEVATED BLOOD PRESSURE READING WITHOUT DIAGNOSIS OF HYPERTENSION: ICD-10-CM

## 2021-02-15 DIAGNOSIS — Z12.31 ENCOUNTER FOR SCREENING MAMMOGRAM FOR BREAST CANCER: ICD-10-CM

## 2021-02-15 PROCEDURE — 77063 MAMMO DIGITAL SCREENING BILAT WITH TOMO: ICD-10-PCS | Mod: 26,,, | Performed by: RADIOLOGY

## 2021-02-15 PROCEDURE — 77067 SCR MAMMO BI INCL CAD: CPT | Mod: TC

## 2021-02-15 PROCEDURE — 77080 DXA BONE DENSITY AXIAL: CPT | Mod: 26,,, | Performed by: RADIOLOGY

## 2021-02-15 PROCEDURE — 93005 ELECTROCARDIOGRAM TRACING: CPT

## 2021-02-15 PROCEDURE — 77067 SCR MAMMO BI INCL CAD: CPT | Mod: 26,,, | Performed by: RADIOLOGY

## 2021-02-15 PROCEDURE — 77080 DEXA BONE DENSITY SPINE HIP: ICD-10-PCS | Mod: 26,,, | Performed by: RADIOLOGY

## 2021-02-15 PROCEDURE — 77067 MAMMO DIGITAL SCREENING BILAT WITH TOMO: ICD-10-PCS | Mod: 26,,, | Performed by: RADIOLOGY

## 2021-02-15 PROCEDURE — 77080 DXA BONE DENSITY AXIAL: CPT | Mod: TC

## 2021-02-15 PROCEDURE — 77063 BREAST TOMOSYNTHESIS BI: CPT | Mod: 26,,, | Performed by: RADIOLOGY

## 2021-02-18 ENCOUNTER — TELEPHONE (OUTPATIENT)
Dept: FAMILY MEDICINE | Facility: CLINIC | Age: 71
End: 2021-02-18

## 2021-02-24 ENCOUNTER — CLINICAL SUPPORT (OUTPATIENT)
Dept: FAMILY MEDICINE | Facility: CLINIC | Age: 71
End: 2021-02-24
Payer: MEDICARE

## 2021-02-24 VITALS — DIASTOLIC BLOOD PRESSURE: 78 MMHG | SYSTOLIC BLOOD PRESSURE: 138 MMHG

## 2021-03-03 DIAGNOSIS — K29.70 GASTRITIS, UNSPECIFIED, WITHOUT BLEEDING: ICD-10-CM

## 2021-03-03 DIAGNOSIS — K21.00 GASTRO-ESOPHAGEAL REFLUX DISEASE WITH ESOPHAGITIS: ICD-10-CM

## 2021-03-03 DIAGNOSIS — R13.10 DYSPHAGIA, UNSPECIFIED: Primary | ICD-10-CM

## 2021-03-03 DIAGNOSIS — K22.70 BARRETT'S ESOPHAGUS WITHOUT DYSPLASIA: ICD-10-CM

## 2021-03-04 DIAGNOSIS — Z11.59 NEED FOR HEPATITIS C SCREENING TEST: ICD-10-CM

## 2021-03-08 ENCOUNTER — PATIENT MESSAGE (OUTPATIENT)
Dept: ADMINISTRATIVE | Facility: HOSPITAL | Age: 71
End: 2021-03-08

## 2021-03-18 ENCOUNTER — HOSPITAL ENCOUNTER (OUTPATIENT)
Dept: RADIOLOGY | Facility: HOSPITAL | Age: 71
Discharge: HOME | End: 2021-03-18
Attending: INTERNAL MEDICINE
Payer: MEDICARE

## 2021-03-18 DIAGNOSIS — K29.70 GASTRITIS, UNSPECIFIED, WITHOUT BLEEDING: ICD-10-CM

## 2021-03-18 DIAGNOSIS — R13.10 DYSPHAGIA, UNSPECIFIED: ICD-10-CM

## 2021-03-18 DIAGNOSIS — K22.70 BARRETT'S ESOPHAGUS WITHOUT DYSPLASIA: ICD-10-CM

## 2021-03-18 DIAGNOSIS — K21.00 GASTRO-ESOPHAGEAL REFLUX DISEASE WITH ESOPHAGITIS: ICD-10-CM

## 2021-03-18 PROCEDURE — 76700 US EXAM ABDOM COMPLETE: CPT | Mod: TC

## 2021-03-18 PROCEDURE — 76700 US ABDOMEN COMPLETE: ICD-10-PCS | Mod: 26,,, | Performed by: RADIOLOGY

## 2021-03-18 PROCEDURE — 76700 US EXAM ABDOM COMPLETE: CPT | Mod: 26,,, | Performed by: RADIOLOGY

## 2021-04-07 ENCOUNTER — PATIENT MESSAGE (OUTPATIENT)
Dept: ADMINISTRATIVE | Facility: HOSPITAL | Age: 71
End: 2021-04-07

## 2021-05-06 ENCOUNTER — NURSE TRIAGE (OUTPATIENT)
Dept: ADMINISTRATIVE | Facility: CLINIC | Age: 71
End: 2021-05-06

## 2021-05-06 ENCOUNTER — LAB VISIT (OUTPATIENT)
Dept: LAB | Facility: HOSPITAL | Age: 71
End: 2021-05-06
Attending: INTERNAL MEDICINE
Payer: MEDICARE

## 2021-05-06 ENCOUNTER — PATIENT MESSAGE (OUTPATIENT)
Dept: ADMINISTRATIVE | Facility: OTHER | Age: 71
End: 2021-05-06

## 2021-05-06 DIAGNOSIS — K22.70 BARRETT'S ESOPHAGUS: ICD-10-CM

## 2021-05-06 DIAGNOSIS — R13.10 PROBLEMS WITH SWALLOWING AND MASTICATION: ICD-10-CM

## 2021-05-06 DIAGNOSIS — K20.90 GASTROESOPHAGITIS: ICD-10-CM

## 2021-05-06 DIAGNOSIS — K21.9 ESOPHAGEAL REFLUX: Primary | ICD-10-CM

## 2021-05-06 DIAGNOSIS — K29.70 GASTROESOPHAGITIS: ICD-10-CM

## 2021-05-06 LAB
ALBUMIN SERPL BCP-MCNC: 4.2 G/DL (ref 3.5–5.2)
ALP SERPL-CCNC: 61 U/L (ref 55–135)
ALT SERPL W/O P-5'-P-CCNC: 28 U/L (ref 10–44)
AMYLASE SERPL-CCNC: 50 U/L (ref 20–110)
ANION GAP SERPL CALC-SCNC: 11 MMOL/L (ref 8–16)
AST SERPL-CCNC: 21 U/L (ref 10–40)
BILIRUB SERPL-MCNC: 0.3 MG/DL (ref 0.1–1)
BUN SERPL-MCNC: 27 MG/DL (ref 8–23)
CALCIUM SERPL-MCNC: 9.3 MG/DL (ref 8.7–10.5)
CHLORIDE SERPL-SCNC: 106 MMOL/L (ref 95–110)
CO2 SERPL-SCNC: 25 MMOL/L (ref 23–29)
CREAT SERPL-MCNC: 0.9 MG/DL (ref 0.5–1.4)
ERYTHROCYTE [DISTWIDTH] IN BLOOD BY AUTOMATED COUNT: 13.5 % (ref 11.5–14.5)
EST. GFR  (AFRICAN AMERICAN): >60 ML/MIN/1.73 M^2
EST. GFR  (NON AFRICAN AMERICAN): >60 ML/MIN/1.73 M^2
GLUCOSE SERPL-MCNC: 115 MG/DL (ref 70–110)
HCT VFR BLD AUTO: 45.3 % (ref 37–48.5)
HGB BLD-MCNC: 14.7 G/DL (ref 12–16)
LIPASE SERPL-CCNC: 29 U/L (ref 4–60)
MCH RBC QN AUTO: 29.2 PG (ref 27–31)
MCHC RBC AUTO-ENTMCNC: 32.5 G/DL (ref 32–36)
MCV RBC AUTO: 90 FL (ref 82–98)
PLATELET # BLD AUTO: 239 K/UL (ref 150–450)
PMV BLD AUTO: 11.3 FL (ref 9.2–12.9)
POTASSIUM SERPL-SCNC: 4.1 MMOL/L (ref 3.5–5.1)
PROT SERPL-MCNC: 7.5 G/DL (ref 6–8.4)
RBC # BLD AUTO: 5.04 M/UL (ref 4–5.4)
SODIUM SERPL-SCNC: 142 MMOL/L (ref 136–145)
WBC # BLD AUTO: 6.24 K/UL (ref 3.9–12.7)

## 2021-05-06 PROCEDURE — 36415 COLL VENOUS BLD VENIPUNCTURE: CPT | Performed by: INTERNAL MEDICINE

## 2021-05-06 PROCEDURE — 80053 COMPREHEN METABOLIC PANEL: CPT | Performed by: INTERNAL MEDICINE

## 2021-05-06 PROCEDURE — 85027 COMPLETE CBC AUTOMATED: CPT | Performed by: INTERNAL MEDICINE

## 2021-05-06 PROCEDURE — 83690 ASSAY OF LIPASE: CPT | Performed by: INTERNAL MEDICINE

## 2021-05-06 PROCEDURE — 82150 ASSAY OF AMYLASE: CPT | Performed by: INTERNAL MEDICINE

## 2021-05-06 PROCEDURE — 83516 IMMUNOASSAY NONANTIBODY: CPT | Mod: 59 | Performed by: INTERNAL MEDICINE

## 2021-05-10 LAB
GLIADIN PEPTIDE IGA SER-ACNC: 7 UNITS
GLIADIN PEPTIDE IGG SER-ACNC: 2 UNITS
IGA SERPL-MCNC: 298 MG/DL (ref 70–400)
TTG IGA SER-ACNC: 7 UNITS
TTG IGG SER-ACNC: 7 UNITS

## 2021-08-06 ENCOUNTER — LAB VISIT (OUTPATIENT)
Dept: LAB | Facility: HOSPITAL | Age: 71
End: 2021-08-06
Attending: FAMILY MEDICINE
Payer: MEDICARE

## 2021-08-06 ENCOUNTER — TELEPHONE (OUTPATIENT)
Dept: FAMILY MEDICINE | Facility: CLINIC | Age: 71
End: 2021-08-06

## 2021-08-06 DIAGNOSIS — R39.9 UTI SYMPTOMS: Primary | ICD-10-CM

## 2021-08-06 DIAGNOSIS — R39.9 UTI SYMPTOMS: ICD-10-CM

## 2021-08-06 LAB
BACTERIA #/AREA URNS HPF: ABNORMAL /HPF
BILIRUB UR QL STRIP: NEGATIVE
CLARITY UR: CLEAR
COLOR UR: YELLOW
GLUCOSE UR QL STRIP: NEGATIVE
HGB UR QL STRIP: ABNORMAL
KETONES UR QL STRIP: NEGATIVE
LEUKOCYTE ESTERASE UR QL STRIP: ABNORMAL
MICROSCOPIC COMMENT: ABNORMAL
NITRITE UR QL STRIP: NEGATIVE
PH UR STRIP: 7 [PH] (ref 5–8)
PROT UR QL STRIP: NEGATIVE
RBC #/AREA URNS HPF: 20 /HPF (ref 0–4)
SP GR UR STRIP: 1.01 (ref 1–1.03)
URN SPEC COLLECT METH UR: ABNORMAL
UROBILINOGEN UR STRIP-ACNC: NEGATIVE EU/DL
WBC #/AREA URNS HPF: >100 /HPF (ref 0–5)

## 2021-08-06 PROCEDURE — 87077 CULTURE AEROBIC IDENTIFY: CPT | Performed by: FAMILY MEDICINE

## 2021-08-06 PROCEDURE — 87186 SC STD MICRODIL/AGAR DIL: CPT | Performed by: FAMILY MEDICINE

## 2021-08-06 PROCEDURE — 81000 URINALYSIS NONAUTO W/SCOPE: CPT | Performed by: FAMILY MEDICINE

## 2021-08-06 PROCEDURE — 87086 URINE CULTURE/COLONY COUNT: CPT | Performed by: FAMILY MEDICINE

## 2021-08-06 PROCEDURE — 87088 URINE BACTERIA CULTURE: CPT | Performed by: FAMILY MEDICINE

## 2021-08-06 RX ORDER — NITROFURANTOIN 25; 75 MG/1; MG/1
100 CAPSULE ORAL 2 TIMES DAILY
Qty: 14 CAPSULE | Refills: 0 | Status: SHIPPED | OUTPATIENT
Start: 2021-08-06 | End: 2021-08-13

## 2021-08-09 LAB — BACTERIA UR CULT: ABNORMAL

## 2021-08-09 RX ORDER — AMPICILLIN 500 MG/1
500 CAPSULE ORAL EVERY 6 HOURS
Qty: 28 CAPSULE | Refills: 0 | Status: SHIPPED | OUTPATIENT
Start: 2021-08-09 | End: 2021-08-16

## 2021-12-20 ENCOUNTER — IMMUNIZATION (OUTPATIENT)
Dept: FAMILY MEDICINE | Facility: CLINIC | Age: 71
End: 2021-12-20
Payer: MEDICARE

## 2021-12-20 DIAGNOSIS — Z23 NEED FOR VACCINATION: Primary | ICD-10-CM

## 2021-12-20 PROCEDURE — 0004A COVID-19, MRNA, LNP-S, PF, 30 MCG/0.3 ML DOSE VACCINE: CPT | Mod: PBBFAC | Performed by: FAMILY MEDICINE

## 2022-01-06 ENCOUNTER — PATIENT OUTREACH (OUTPATIENT)
Dept: ADMINISTRATIVE | Facility: HOSPITAL | Age: 72
End: 2022-01-06
Payer: MEDICARE

## 2022-01-06 NOTE — PROGRESS NOTES
Pre-visit Chart review notification  Population Health Outreach.  01/06/2022  EFAX SENT TO [facility] FOR MOST RECENT COLONOSOCPY & MAMMOGRAM  RESULTS

## 2022-01-06 NOTE — LETTER
January 6, 2022    Sosa Viramontes  [address] [place] [address]             [facility]  [address]  [place] [address]  Phone: [phone] Dear [facility] is committed to your overall health and would like to ensure that you are up to date on your recommended test and/or procedures.   Niya Cartwright MD  has found that your chart shows you may be due for the following:    Health Maintenance Due   Topic Date Due    Hepatitis C Screening  Never done    TETANUS VACCINE  Never done    Colorectal Cancer Screening  Never done    Shingles Vaccine (1 of 2) Never done    Influenza Vaccine (1) 09/01/2021    Mammogram  02/15/2022     If you have had any of the above done at another facility, please let us know so that we may obtain copies from that facility.      If you have a copy of these records, please provide a copy for us to scan into your chart.  You are welcome to request that the report be faxed to us at  ([phone]).     If you have an upcoming scheduled appointment for the item above, please disregard this letter.      Melissa Charles LPN  Performance Improvement Coordinator  [facility]  [address] [place] [address] [phone]

## 2022-01-06 NOTE — LETTER
FAX      AUTHORIZATION FOR RELEASE OF   CONFIDENTIAL INFORMATION        [facility] MEDICAL RECORDS      We are seeing Sosa Viramontes, date of birth 1950, in the clinic at [facility]. Niya Cartwright MD is the patient's PCP. Sosa Viramontes has an outstanding lab/procedure at the time we reviewed their chart. In order to help keep their health information updated, Sosa Viramontes has authorized us to request the following medical record(s):        ( X )  BILATERAL MAMMOGRAM SCREENING (WITHIN 2 YRS)                 ( X )  COLONOSCOPY (WITHIN 10 YRS)        Please fax records to [facility]  [phone]      Melissa Charles LPN  Performance Improvement Coordinator  [facility]  [address] [place] [address], [place] [address] [phone]

## 2022-01-10 ENCOUNTER — PATIENT OUTREACH (OUTPATIENT)
Dept: ADMINISTRATIVE | Facility: HOSPITAL | Age: 72
End: 2022-01-10
Payer: MEDICARE

## 2022-01-10 NOTE — PROGRESS NOTES
Population Health      The following record(s)  below were uploaded for Health Maintenance .       (  )  MAMMOGRAM                               (  )  A1C     (  )  PAP SMEAR                                   (  )  LIPID PANEL     (  )  HPV SCREENING                          (  )  URINE MICROALBUMIN     (  )  HIV SCREENING                            (  )  CMP      (  )  HEP C SCREENING                       (  )  DEXA SCREENING     ( X )  COLONOSCOPY                            (  )  AAA SCREENING     (  )  FIT KIT                                            (  )   LDCT LUNG SCAN SCREENING ( ANNUAL CHEST X-RAY)     (  )  COLOGUARD                                 (  )   ANNUAL DIABETIC FOOT EXAM       (  )  ANNUAL DIABETIC EYE EXAM     (  ) IMMUNIZATION(S)                         (  ) CHLAMYDIA SCREENING

## 2022-01-20 ENCOUNTER — OFFICE VISIT (OUTPATIENT)
Dept: FAMILY MEDICINE | Facility: CLINIC | Age: 72
End: 2022-01-20
Payer: MEDICARE

## 2022-01-20 VITALS
SYSTOLIC BLOOD PRESSURE: 135 MMHG | DIASTOLIC BLOOD PRESSURE: 76 MMHG | WEIGHT: 200 LBS | BODY MASS INDEX: 29.62 KG/M2 | HEIGHT: 69 IN | RESPIRATION RATE: 16 BRPM | OXYGEN SATURATION: 97 % | HEART RATE: 64 BPM

## 2022-01-20 DIAGNOSIS — Z12.31 ENCOUNTER FOR SCREENING MAMMOGRAM FOR BREAST CANCER: ICD-10-CM

## 2022-01-20 DIAGNOSIS — N39.0 RECURRENT UTI (URINARY TRACT INFECTION): Primary | ICD-10-CM

## 2022-01-20 DIAGNOSIS — N95.2 VAGINAL ATROPHY: ICD-10-CM

## 2022-01-20 DIAGNOSIS — Z51.81 ENCOUNTER FOR MEDICATION MONITORING: ICD-10-CM

## 2022-01-20 DIAGNOSIS — Z13.220 ENCOUNTER FOR LIPID SCREENING FOR CARDIOVASCULAR DISEASE: ICD-10-CM

## 2022-01-20 DIAGNOSIS — F32.A ANXIETY AND DEPRESSION: ICD-10-CM

## 2022-01-20 DIAGNOSIS — F41.9 ANXIETY AND DEPRESSION: ICD-10-CM

## 2022-01-20 DIAGNOSIS — Z13.6 ENCOUNTER FOR LIPID SCREENING FOR CARDIOVASCULAR DISEASE: ICD-10-CM

## 2022-01-20 PROCEDURE — 1157F PR ADVANCE CARE PLAN OR EQUIV PRESENT IN MEDICAL RECORD: ICD-10-PCS | Mod: CPTII,S$GLB,, | Performed by: FAMILY MEDICINE

## 2022-01-20 PROCEDURE — 1126F PR PAIN SEVERITY QUANTIFIED, NO PAIN PRESENT: ICD-10-PCS | Mod: CPTII,S$GLB,, | Performed by: FAMILY MEDICINE

## 2022-01-20 PROCEDURE — 3288F PR FALLS RISK ASSESSMENT DOCUMENTED: ICD-10-PCS | Mod: CPTII,S$GLB,, | Performed by: FAMILY MEDICINE

## 2022-01-20 PROCEDURE — 3075F SYST BP GE 130 - 139MM HG: CPT | Mod: CPTII,S$GLB,, | Performed by: FAMILY MEDICINE

## 2022-01-20 PROCEDURE — 99214 OFFICE O/P EST MOD 30 MIN: CPT | Mod: S$GLB,,, | Performed by: FAMILY MEDICINE

## 2022-01-20 PROCEDURE — 1157F ADVNC CARE PLAN IN RCRD: CPT | Mod: CPTII,S$GLB,, | Performed by: FAMILY MEDICINE

## 2022-01-20 PROCEDURE — 1159F PR MEDICATION LIST DOCUMENTED IN MEDICAL RECORD: ICD-10-PCS | Mod: CPTII,S$GLB,, | Performed by: FAMILY MEDICINE

## 2022-01-20 PROCEDURE — 99999 PR PBB SHADOW E&M-EST. PATIENT-LVL V: ICD-10-PCS | Mod: PBBFAC,,, | Performed by: FAMILY MEDICINE

## 2022-01-20 PROCEDURE — 1101F PR PT FALLS ASSESS DOC 0-1 FALLS W/OUT INJ PAST YR: ICD-10-PCS | Mod: CPTII,S$GLB,, | Performed by: FAMILY MEDICINE

## 2022-01-20 PROCEDURE — 1126F AMNT PAIN NOTED NONE PRSNT: CPT | Mod: CPTII,S$GLB,, | Performed by: FAMILY MEDICINE

## 2022-01-20 PROCEDURE — 3008F PR BODY MASS INDEX (BMI) DOCUMENTED: ICD-10-PCS | Mod: CPTII,S$GLB,, | Performed by: FAMILY MEDICINE

## 2022-01-20 PROCEDURE — 3008F BODY MASS INDEX DOCD: CPT | Mod: CPTII,S$GLB,, | Performed by: FAMILY MEDICINE

## 2022-01-20 PROCEDURE — 1160F PR REVIEW ALL MEDS BY PRESCRIBER/CLIN PHARMACIST DOCUMENTED: ICD-10-PCS | Mod: CPTII,S$GLB,, | Performed by: FAMILY MEDICINE

## 2022-01-20 PROCEDURE — 99999 PR PBB SHADOW E&M-EST. PATIENT-LVL V: CPT | Mod: PBBFAC,,, | Performed by: FAMILY MEDICINE

## 2022-01-20 PROCEDURE — 99214 PR OFFICE/OUTPT VISIT, EST, LEVL IV, 30-39 MIN: ICD-10-PCS | Mod: S$GLB,,, | Performed by: FAMILY MEDICINE

## 2022-01-20 PROCEDURE — 1160F RVW MEDS BY RX/DR IN RCRD: CPT | Mod: CPTII,S$GLB,, | Performed by: FAMILY MEDICINE

## 2022-01-20 PROCEDURE — 3078F PR MOST RECENT DIASTOLIC BLOOD PRESSURE < 80 MM HG: ICD-10-PCS | Mod: CPTII,S$GLB,, | Performed by: FAMILY MEDICINE

## 2022-01-20 PROCEDURE — 1159F MED LIST DOCD IN RCRD: CPT | Mod: CPTII,S$GLB,, | Performed by: FAMILY MEDICINE

## 2022-01-20 PROCEDURE — 3075F PR MOST RECENT SYSTOLIC BLOOD PRESS GE 130-139MM HG: ICD-10-PCS | Mod: CPTII,S$GLB,, | Performed by: FAMILY MEDICINE

## 2022-01-20 PROCEDURE — 3078F DIAST BP <80 MM HG: CPT | Mod: CPTII,S$GLB,, | Performed by: FAMILY MEDICINE

## 2022-01-20 PROCEDURE — 1101F PT FALLS ASSESS-DOCD LE1/YR: CPT | Mod: CPTII,S$GLB,, | Performed by: FAMILY MEDICINE

## 2022-01-20 PROCEDURE — 3288F FALL RISK ASSESSMENT DOCD: CPT | Mod: CPTII,S$GLB,, | Performed by: FAMILY MEDICINE

## 2022-01-20 RX ORDER — CETIRIZINE HYDROCHLORIDE 10 MG/1
10 TABLET ORAL DAILY
COMMUNITY
Start: 2021-09-13 | End: 2023-05-24 | Stop reason: SDUPTHER

## 2022-01-20 RX ORDER — ESTRADIOL 0.1 MG/G
CREAM VAGINAL
Qty: 42.5 G | Refills: 1 | Status: SHIPPED | OUTPATIENT
Start: 2022-01-20 | End: 2022-04-11 | Stop reason: SDUPTHER

## 2022-01-20 RX ORDER — SUCRALFATE 1 G/1
1 TABLET ORAL 4 TIMES DAILY
COMMUNITY
Start: 2021-12-16 | End: 2023-11-22

## 2022-01-20 RX ORDER — FAMOTIDINE 40 MG/1
40 TABLET, FILM COATED ORAL DAILY
COMMUNITY
Start: 2021-12-16 | End: 2022-04-20 | Stop reason: SDUPTHER

## 2022-01-20 NOTE — PROGRESS NOTES
[facility] - Clinic Note    Subjective      Ms. Viramontes is a 71 y.o. female who presents to clinic for a follow up.       Reports that she had a NP come to her house for an exam. Did ABIs and was told she has a circulatory problem. Did not bring paperwork in. Will drop it off for me to review for further treatment.     Complains of anxiety. Which she has been seen for previously.   Mainly due her partner on house arrest. Unable to do things that she would like to do.   No longer interested in previous hobbies.  Does not want to do medications.   More situational.     Little interest or pleasure in doing things: Nearly every day  Feeling down, depressed, or hopeless: Several days  Trouble falling or staying asleep, or sleeping too much: Nearly every day  Feeling tired or having little energy: Nearly every day  Poor appetite or overeating: More than half the days  Feeling bad about yourself - or that you are a failure or have let yourself or your family down: Not at all  Trouble concentrating on things, such as reading the newspaper or watching television: More than half the days  Moving or speaking so slowly that other people could have noticed. Or the opposite - being so fidgety or restless that you have been moving around a lot more than usual: Not at all  Thoughts that you would be better off dead, or of hurting yourself in some way: Several days  PHQ-9 Total Score: 15  If you checked off any problems, how difficult have these problems made it for you to do your work, take care of things at home, or get along with other people?: Somewhat difficult  PHQ-9 Total Score: 15     JACKY-7 Score: 11  Interpretation: Moderate Anxiety      PMH Sosa has a past medical history of Colitis, COVID-19 (08/2021), GERD (gastroesophageal reflux disease), Recurrent UTI, SVT (supraventricular tachycardia), and Syncope.   PSXH Sosa has a past surgical history that includes Hysterectomy; Bladder suspension (1998); Cholecystectomy;  "Ablation; Eye surgery; Insertion of midurethral sling (N/A, 7/16/2019); Cystoscopy (N/A, 7/16/2019); Robot-assisted laparoscopic lysis of adhesions using da Nikos Xi (N/A, 7/16/2019); and Oophorectomy.   RADHA Swan's family history includes Breast cancer in her maternal aunt; COPD in her father; Cancer in her mother; Hearing loss in her father and mother; Hypertension in her mother.   CHARLES Swan reports that she quit smoking about 16 years ago. Her smoking use included cigarettes. She has a 45.00 pack-year smoking history. She has never used smokeless tobacco. She reports current alcohol use. She reports that she does not use drugs.   ROMAN Swan is allergic to celebrex [celecoxib].   MARIANNE Swan has a current medication list which includes the following prescription(s): cetirizine, clonidine, famotidine, magnesium oxide, uribel, multivitamin, naproxen sodium, omeprazole, sucralfate, trazodone, and estradiol.     Review of Systems   Constitutional: Negative for activity change, appetite change, chills, fatigue and fever.   HENT: Negative for dental problem, postnasal drip, rhinorrhea, sinus pressure, sinus pain, sneezing, sore throat, tinnitus and voice change.    Eyes: Negative for visual disturbance.   Respiratory: Negative for cough and shortness of breath.    Cardiovascular: Negative for chest pain, palpitations and leg swelling.   Gastrointestinal: Negative for abdominal pain, nausea and vomiting.   Skin: Negative for wound.   Neurological: Negative for dizziness and headaches.   Psychiatric/Behavioral: Positive for decreased concentration. Negative for suicidal ideas. The patient is nervous/anxious.      Objective     /76 (BP Location: Left arm, Patient Position: Sitting, BP Method: Large (Manual))   Pulse 64   Resp 16   Ht 5' 9" (1.753 m)   Wt 90.7 kg (200 lb)   LMP  (LMP Unknown) Comment: 1998  SpO2 97%   BMI 29.53 kg/m²     Physical Exam   Constitutional: She appears well-developed and well-nourished.  " Non-toxic appearance. She does not appear ill. No distress.   HENT:   Head: Normocephalic and atraumatic.   Eyes: Right eye exhibits no discharge. Left eye exhibits no discharge.   Cardiovascular: Normal rate, regular rhythm, normal heart sounds, intact distal pulses and normal pulses. Exam reveals no gallop and no friction rub.   No murmur heard.  Pulmonary/Chest: Effort normal and breath sounds normal. No respiratory distress. She has no wheezes. She has no rhonchi. She has no rales.   Abdominal: Normal appearance.   Musculoskeletal:      Cervical back: Neck supple.      Right lower leg: No edema.      Left lower leg: No edema.   Lymphadenopathy:     She has no cervical adenopathy.   Neurological: She is alert.   Skin: Skin is warm and dry. Capillary refill takes less than 2 seconds. She is not diaphoretic.   Psychiatric: She has a normal mood and affect. Her behavior is normal. Mood, judgment and thought content normal.   Vitals reviewed.     Assessment/Plan     Sosa was seen today for follow-up, otalgia and anxiety.    Diagnoses and all orders for this visit:    Recurrent UTI (urinary tract infection)  -     Ambulatory referral/consult to Urology; Future    Encounter for screening mammogram for breast cancer  -     Mammo Digital Screening Bilat w/ Clifford; Future    Vaginal atrophy  -     estradioL (ESTRACE) 0.01 % (0.1 mg/gram) vaginal cream; Use 1/2  gram per vagina nightly x 2 weeks then 3 times per week    Encounter for lipid screening for cardiovascular disease  -     Lipid Panel; Future    Encounter for medication monitoring  -     CBC Auto Differential; Future  -     Comprehensive Metabolic Panel; Future    Anxiety and depression  -     TSH; Future      Future Appointments   Date Time Provider Department Center   2/17/2022  8:45 AM [facility] MAMMO1 [facility] MAMMO [facility]   4/20/2022 11:20 AM Niya Cartwright MD [facility]       Niya Cartwright MD  Family Medicine  [facility]

## 2022-01-20 NOTE — PATIENT INSTRUCTIONS
Psych Recommendations:     - Piper Donahue - [phone] *therapist*  - Nalini Wheeler - [phone] *psychiatrist*  - Rachel Klein - [phone] *therapist*  - [facility] ([place]) - [phone]  - [facility] ([facility])  - [phone]  - [facility]) - [phone]  - [facility] ([place]) - [phone]  - Renee Vega ([place]) - [phone] *therapist*      Please ALWAYS ask for their recommendations, if the providers office, or facility is not accepting new patients.       You can find Debrox ear drops over the counter at [facility].

## 2022-02-17 ENCOUNTER — HOSPITAL ENCOUNTER (OUTPATIENT)
Dept: RADIOLOGY | Facility: HOSPITAL | Age: 72
Discharge: HOME | End: 2022-02-17
Attending: FAMILY MEDICINE
Payer: MEDICARE

## 2022-02-17 VITALS — HEIGHT: 69 IN | BODY MASS INDEX: 29.61 KG/M2 | WEIGHT: 199.94 LBS

## 2022-02-17 DIAGNOSIS — Z12.31 ENCOUNTER FOR SCREENING MAMMOGRAM FOR BREAST CANCER: ICD-10-CM

## 2022-02-17 PROCEDURE — 77067 SCR MAMMO BI INCL CAD: CPT | Mod: 26,,, | Performed by: RADIOLOGY

## 2022-02-17 PROCEDURE — 77067 SCR MAMMO BI INCL CAD: CPT | Mod: TC

## 2022-02-17 PROCEDURE — 77067 MAMMO DIGITAL SCREENING BILAT WITH TOMO: ICD-10-PCS | Mod: 26,,, | Performed by: RADIOLOGY

## 2022-02-17 PROCEDURE — 77063 BREAST TOMOSYNTHESIS BI: CPT | Mod: TC

## 2022-02-17 PROCEDURE — 77063 BREAST TOMOSYNTHESIS BI: CPT | Mod: 26,,, | Performed by: RADIOLOGY

## 2022-02-17 PROCEDURE — 77063 MAMMO DIGITAL SCREENING BILAT WITH TOMO: ICD-10-PCS | Mod: 26,,, | Performed by: RADIOLOGY

## 2022-04-20 ENCOUNTER — OFFICE VISIT (OUTPATIENT)
Dept: FAMILY MEDICINE | Facility: CLINIC | Age: 72
End: 2022-04-20
Payer: MEDICARE

## 2022-04-20 VITALS
WEIGHT: 201.5 LBS | DIASTOLIC BLOOD PRESSURE: 72 MMHG | HEART RATE: 70 BPM | RESPIRATION RATE: 15 BRPM | BODY MASS INDEX: 29.84 KG/M2 | SYSTOLIC BLOOD PRESSURE: 136 MMHG | HEIGHT: 69 IN | OXYGEN SATURATION: 97 %

## 2022-04-20 DIAGNOSIS — N30.90 RECURRENT CYSTITIS: ICD-10-CM

## 2022-04-20 DIAGNOSIS — K21.9 GASTROESOPHAGEAL REFLUX DISEASE, UNSPECIFIED WHETHER ESOPHAGITIS PRESENT: ICD-10-CM

## 2022-04-20 DIAGNOSIS — R41.3 OTHER AMNESIA: ICD-10-CM

## 2022-04-20 DIAGNOSIS — R43.8 POST-COVID CHRONIC LOSS OF SMELL AND TASTE: ICD-10-CM

## 2022-04-20 DIAGNOSIS — U09.9 POST-COVID CHRONIC LOSS OF SMELL AND TASTE: ICD-10-CM

## 2022-04-20 DIAGNOSIS — R41.3 MEMORY LOSS: Primary | ICD-10-CM

## 2022-04-20 DIAGNOSIS — G47.9 SLEEP DISTURBANCE: ICD-10-CM

## 2022-04-20 PROCEDURE — 1157F ADVNC CARE PLAN IN RCRD: CPT | Mod: CPTII,S$GLB,, | Performed by: FAMILY MEDICINE

## 2022-04-20 PROCEDURE — 3075F PR MOST RECENT SYSTOLIC BLOOD PRESS GE 130-139MM HG: ICD-10-PCS | Mod: CPTII,S$GLB,, | Performed by: FAMILY MEDICINE

## 2022-04-20 PROCEDURE — 99214 PR OFFICE/OUTPT VISIT, EST, LEVL IV, 30-39 MIN: ICD-10-PCS | Mod: S$GLB,,, | Performed by: FAMILY MEDICINE

## 2022-04-20 PROCEDURE — 1126F AMNT PAIN NOTED NONE PRSNT: CPT | Mod: CPTII,S$GLB,, | Performed by: FAMILY MEDICINE

## 2022-04-20 PROCEDURE — 1101F PT FALLS ASSESS-DOCD LE1/YR: CPT | Mod: CPTII,S$GLB,, | Performed by: FAMILY MEDICINE

## 2022-04-20 PROCEDURE — 3288F FALL RISK ASSESSMENT DOCD: CPT | Mod: CPTII,S$GLB,, | Performed by: FAMILY MEDICINE

## 2022-04-20 PROCEDURE — 1101F PR PT FALLS ASSESS DOC 0-1 FALLS W/OUT INJ PAST YR: ICD-10-PCS | Mod: CPTII,S$GLB,, | Performed by: FAMILY MEDICINE

## 2022-04-20 PROCEDURE — 1157F PR ADVANCE CARE PLAN OR EQUIV PRESENT IN MEDICAL RECORD: ICD-10-PCS | Mod: CPTII,S$GLB,, | Performed by: FAMILY MEDICINE

## 2022-04-20 PROCEDURE — 99999 PR PBB SHADOW E&M-EST. PATIENT-LVL III: ICD-10-PCS | Mod: PBBFAC,,, | Performed by: FAMILY MEDICINE

## 2022-04-20 PROCEDURE — 3288F PR FALLS RISK ASSESSMENT DOCUMENTED: ICD-10-PCS | Mod: CPTII,S$GLB,, | Performed by: FAMILY MEDICINE

## 2022-04-20 PROCEDURE — 3008F BODY MASS INDEX DOCD: CPT | Mod: CPTII,S$GLB,, | Performed by: FAMILY MEDICINE

## 2022-04-20 PROCEDURE — 3078F DIAST BP <80 MM HG: CPT | Mod: CPTII,S$GLB,, | Performed by: FAMILY MEDICINE

## 2022-04-20 PROCEDURE — 3078F PR MOST RECENT DIASTOLIC BLOOD PRESSURE < 80 MM HG: ICD-10-PCS | Mod: CPTII,S$GLB,, | Performed by: FAMILY MEDICINE

## 2022-04-20 PROCEDURE — 99214 OFFICE O/P EST MOD 30 MIN: CPT | Mod: S$GLB,,, | Performed by: FAMILY MEDICINE

## 2022-04-20 PROCEDURE — 3075F SYST BP GE 130 - 139MM HG: CPT | Mod: CPTII,S$GLB,, | Performed by: FAMILY MEDICINE

## 2022-04-20 PROCEDURE — 3008F PR BODY MASS INDEX (BMI) DOCUMENTED: ICD-10-PCS | Mod: CPTII,S$GLB,, | Performed by: FAMILY MEDICINE

## 2022-04-20 PROCEDURE — 1126F PR PAIN SEVERITY QUANTIFIED, NO PAIN PRESENT: ICD-10-PCS | Mod: CPTII,S$GLB,, | Performed by: FAMILY MEDICINE

## 2022-04-20 PROCEDURE — 99999 PR PBB SHADOW E&M-EST. PATIENT-LVL III: CPT | Mod: PBBFAC,,, | Performed by: FAMILY MEDICINE

## 2022-04-20 RX ORDER — OMEPRAZOLE 40 MG/1
CAPSULE, DELAYED RELEASE ORAL
Qty: 90 CAPSULE | Refills: 3 | Status: SHIPPED | OUTPATIENT
Start: 2022-04-20 | End: 2023-05-24 | Stop reason: SDUPTHER

## 2022-04-20 RX ORDER — METHENAMINE, SODIUM PHOSPHATE, MONOBASIC, MONOHYDRATE, PHENYL SALICYLATE, METHYLENE BLUE, AND HYOSCYAMINE SULFATE 118; 40.8; 36; 10; .12 MG/1; MG/1; MG/1; MG/1; MG/1
1 CAPSULE ORAL 4 TIMES DAILY PRN
Qty: 20 CAPSULE | Refills: 3 | Status: SHIPPED | OUTPATIENT
Start: 2022-04-20 | End: 2022-09-09 | Stop reason: SDUPTHER

## 2022-04-20 RX ORDER — FAMOTIDINE 40 MG/1
40 TABLET, FILM COATED ORAL DAILY
Qty: 90 TABLET | Refills: 3 | Status: SHIPPED | OUTPATIENT
Start: 2022-04-20 | End: 2023-05-24 | Stop reason: SDUPTHER

## 2022-04-20 RX ORDER — TRAZODONE HYDROCHLORIDE 100 MG/1
TABLET ORAL
Qty: 90 TABLET | Refills: 3 | Status: SHIPPED | OUTPATIENT
Start: 2022-04-20 | End: 2023-04-10

## 2022-04-20 NOTE — PROGRESS NOTES
[facility] - Clinic Note    Subjective      Ms. Viramontes is a 71 y.o. female who presents to clinic for a follow up.     Overall is doing well.   Needs refill on some of her medications.   Patient reports that she had covid last August 2021. Continues to have loss of taste and smell.   She reports that she has noticed increasing forgetfulness over the past year.   More short term loss.   She reports that in [employer] before playing she was supposed to play verses 1-4 but while playing she played 1-6 and forget what to do.   She has memory testing done a few years back and was told everything was fine.     PMH Sosa has a past medical history of Colitis, COVID-19 (08/2021), GERD (gastroesophageal reflux disease), Recurrent UTI, SVT (supraventricular tachycardia), and Syncope.   PSXH Sosa has a past surgical history that includes Hysterectomy; Bladder suspension (1998); Cholecystectomy; Ablation; Eye surgery; Insertion of midurethral sling (N/A, 7/16/2019); Cystoscopy (N/A, 7/16/2019); Robot-assisted laparoscopic lysis of adhesions using da Nikos Xi (N/A, 7/16/2019); and Oophorectomy.    Sosa's family history includes Breast cancer in her maternal aunt; COPD in her father; Cancer in her mother; Hearing loss in her father and mother; Hypertension in her mother.   SH Sosa reports that she quit smoking about 16 years ago. Her smoking use included cigarettes. She has a 45.00 pack-year smoking history. She has never used smokeless tobacco. She reports current alcohol use. She reports that she does not use drugs.   ALG Sosa is allergic to celebrex [celecoxib].   MED Sosa has a current medication list which includes the following prescription(s): cetirizine, ciprofloxacin hcl, clonidine, estradiol, magnesium oxide, multivitamin, naproxen sodium, sucralfate, famotidine, uribel, omeprazole, and trazodone.     Review of Systems   Constitutional: Negative for activity change, appetite change, chills, fatigue and fever.   Eyes:  "Negative for visual disturbance.   Respiratory: Negative for cough and shortness of breath.    Cardiovascular: Negative for chest pain, palpitations and leg swelling.   Gastrointestinal: Negative for abdominal pain, nausea and vomiting.   Skin: Negative for wound.   Neurological: Negative for dizziness and headaches.   Psychiatric/Behavioral: Negative for confusion.     Objective     /72 (BP Location: Left arm, Patient Position: Sitting, BP Method: Large (Manual))   Pulse 70   Resp 15   Ht 5' 9" (1.753 m)   Wt 91.4 kg (201 lb 8 oz)   LMP  (LMP Unknown) Comment: 1998  SpO2 97%   BMI 29.76 kg/m²     Physical Exam   Constitutional: She appears well-developed, well-nourished and obese.  Non-toxic appearance. She does not appear ill. No distress.   HENT:   Head: Normocephalic and atraumatic.   Eyes: Right eye exhibits no discharge. Left eye exhibits no discharge.   Cardiovascular: Normal rate, regular rhythm, normal heart sounds and normal pulses. Exam reveals no gallop and no friction rub.   No murmur heard.  Pulmonary/Chest: Effort normal and breath sounds normal. No respiratory distress. She has no wheezes. She has no rhonchi. She has no rales.   Abdominal: Normal appearance.   Musculoskeletal:      Cervical back: Neck supple.   Lymphadenopathy:     She has no cervical adenopathy.   Neurological: She is alert.   Skin: Skin is warm and dry. Capillary refill takes less than 2 seconds. She is not diaphoretic.   Psychiatric: Her behavior is normal. Mood, judgment and thought content normal.   Vitals reviewed.     Assessment/Plan     Sosa was seen today for follow-up.    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    Gastroesophageal reflux disease, unspecified whether esophagitis present  -     omeprazole (PRILOSEC) 40 MG capsule; TK 1 C PO D  -     famotidine (PEPCID) 40 MG tablet; Take 1 tablet (40 mg total) by mouth once daily.    -instructed to get shingrex vaccine at pharmacy due to insurance    Follow up in about 6 months (around 10/20/2022), or if symptoms worsen or fail to improve.    Future Appointments   Date Time Provider Department Center   5/3/2022  9:00 AM [facility] MRI1 350 LB LIMIT [facility]   10/20/2022  3:40 PM Niya Cartwright MD [facility]       Niya Cartwright MD  Family Medicine  [facility]

## 2022-05-03 ENCOUNTER — HOSPITAL ENCOUNTER (OUTPATIENT)
Dept: RADIOLOGY | Facility: HOSPITAL | Age: 72
Discharge: HOME | End: 2022-05-03
Attending: FAMILY MEDICINE
Payer: MEDICARE

## 2022-05-03 DIAGNOSIS — R41.3 MEMORY LOSS: ICD-10-CM

## 2022-05-03 DIAGNOSIS — R41.3 OTHER AMNESIA: ICD-10-CM

## 2022-05-03 PROCEDURE — 70551 MRI BRAIN WITHOUT CONTRAST: ICD-10-PCS | Mod: 26,,, | Performed by: RADIOLOGY

## 2022-05-03 PROCEDURE — 70551 MRI BRAIN STEM W/O DYE: CPT | Mod: TC

## 2022-05-03 PROCEDURE — 70551 MRI BRAIN STEM W/O DYE: CPT | Mod: 26,,, | Performed by: RADIOLOGY

## 2022-05-11 DIAGNOSIS — Z11.59 NEED FOR HEPATITIS C SCREENING TEST: ICD-10-CM

## 2022-10-20 ENCOUNTER — OFFICE VISIT (OUTPATIENT)
Dept: FAMILY MEDICINE | Facility: CLINIC | Age: 72
End: 2022-10-20
Payer: MEDICARE

## 2022-10-20 VITALS
HEART RATE: 69 BPM | RESPIRATION RATE: 18 BRPM | HEIGHT: 69 IN | OXYGEN SATURATION: 98 % | DIASTOLIC BLOOD PRESSURE: 62 MMHG | WEIGHT: 194.63 LBS | BODY MASS INDEX: 28.83 KG/M2 | SYSTOLIC BLOOD PRESSURE: 132 MMHG

## 2022-10-20 DIAGNOSIS — K21.9 GASTROESOPHAGEAL REFLUX DISEASE, UNSPECIFIED WHETHER ESOPHAGITIS PRESENT: ICD-10-CM

## 2022-10-20 DIAGNOSIS — H61.22 IMPACTED CERUMEN OF LEFT EAR: ICD-10-CM

## 2022-10-20 DIAGNOSIS — J01.00 ACUTE NON-RECURRENT MAXILLARY SINUSITIS: Primary | ICD-10-CM

## 2022-10-20 DIAGNOSIS — G47.9 SLEEP DISTURBANCE: ICD-10-CM

## 2022-10-20 PROCEDURE — 96372 PR INJECTION,THERAP/PROPH/DIAG2ST, IM OR SUBCUT: ICD-10-PCS | Mod: S$GLB,,, | Performed by: FAMILY MEDICINE

## 2022-10-20 PROCEDURE — 1160F PR REVIEW ALL MEDS BY PRESCRIBER/CLIN PHARMACIST DOCUMENTED: ICD-10-PCS | Mod: CPTII,S$GLB,, | Performed by: FAMILY MEDICINE

## 2022-10-20 PROCEDURE — 3288F PR FALLS RISK ASSESSMENT DOCUMENTED: ICD-10-PCS | Mod: CPTII,S$GLB,, | Performed by: FAMILY MEDICINE

## 2022-10-20 PROCEDURE — 3075F SYST BP GE 130 - 139MM HG: CPT | Mod: CPTII,S$GLB,, | Performed by: FAMILY MEDICINE

## 2022-10-20 PROCEDURE — 1100F PR PT FALLS ASSESS DOC 2+ FALLS/FALL W/INJURY/YR: ICD-10-PCS | Mod: CPTII,S$GLB,, | Performed by: FAMILY MEDICINE

## 2022-10-20 PROCEDURE — 3078F DIAST BP <80 MM HG: CPT | Mod: CPTII,S$GLB,, | Performed by: FAMILY MEDICINE

## 2022-10-20 PROCEDURE — 1100F PTFALLS ASSESS-DOCD GE2>/YR: CPT | Mod: CPTII,S$GLB,, | Performed by: FAMILY MEDICINE

## 2022-10-20 PROCEDURE — 1159F MED LIST DOCD IN RCRD: CPT | Mod: CPTII,S$GLB,, | Performed by: FAMILY MEDICINE

## 2022-10-20 PROCEDURE — 1126F PR PAIN SEVERITY QUANTIFIED, NO PAIN PRESENT: ICD-10-PCS | Mod: CPTII,S$GLB,, | Performed by: FAMILY MEDICINE

## 2022-10-20 PROCEDURE — 99999 PR PBB SHADOW E&M-EST. PATIENT-LVL IV: ICD-10-PCS | Mod: PBBFAC,,, | Performed by: FAMILY MEDICINE

## 2022-10-20 PROCEDURE — 3078F PR MOST RECENT DIASTOLIC BLOOD PRESSURE < 80 MM HG: ICD-10-PCS | Mod: CPTII,S$GLB,, | Performed by: FAMILY MEDICINE

## 2022-10-20 PROCEDURE — 1157F ADVNC CARE PLAN IN RCRD: CPT | Mod: CPTII,S$GLB,, | Performed by: FAMILY MEDICINE

## 2022-10-20 PROCEDURE — 1159F PR MEDICATION LIST DOCUMENTED IN MEDICAL RECORD: ICD-10-PCS | Mod: CPTII,S$GLB,, | Performed by: FAMILY MEDICINE

## 2022-10-20 PROCEDURE — 3075F PR MOST RECENT SYSTOLIC BLOOD PRESS GE 130-139MM HG: ICD-10-PCS | Mod: CPTII,S$GLB,, | Performed by: FAMILY MEDICINE

## 2022-10-20 PROCEDURE — 99999 PR PBB SHADOW E&M-EST. PATIENT-LVL IV: CPT | Mod: PBBFAC,,, | Performed by: FAMILY MEDICINE

## 2022-10-20 PROCEDURE — 1160F RVW MEDS BY RX/DR IN RCRD: CPT | Mod: CPTII,S$GLB,, | Performed by: FAMILY MEDICINE

## 2022-10-20 PROCEDURE — 3288F FALL RISK ASSESSMENT DOCD: CPT | Mod: CPTII,S$GLB,, | Performed by: FAMILY MEDICINE

## 2022-10-20 PROCEDURE — 99214 OFFICE O/P EST MOD 30 MIN: CPT | Mod: 25,S$GLB,, | Performed by: FAMILY MEDICINE

## 2022-10-20 PROCEDURE — 96372 THER/PROPH/DIAG INJ SC/IM: CPT | Mod: S$GLB,,, | Performed by: FAMILY MEDICINE

## 2022-10-20 PROCEDURE — 1126F AMNT PAIN NOTED NONE PRSNT: CPT | Mod: CPTII,S$GLB,, | Performed by: FAMILY MEDICINE

## 2022-10-20 PROCEDURE — 1157F PR ADVANCE CARE PLAN OR EQUIV PRESENT IN MEDICAL RECORD: ICD-10-PCS | Mod: CPTII,S$GLB,, | Performed by: FAMILY MEDICINE

## 2022-10-20 PROCEDURE — 99214 PR OFFICE/OUTPT VISIT, EST, LEVL IV, 30-39 MIN: ICD-10-PCS | Mod: 25,S$GLB,, | Performed by: FAMILY MEDICINE

## 2022-10-20 RX ORDER — DEXAMETHASONE SODIUM PHOSPHATE 4 MG/ML
4 INJECTION, SOLUTION INTRA-ARTICULAR; INTRALESIONAL; INTRAMUSCULAR; INTRAVENOUS; SOFT TISSUE
Status: COMPLETED | OUTPATIENT
Start: 2022-10-20 | End: 2022-10-20

## 2022-10-20 RX ADMIN — DEXAMETHASONE SODIUM PHOSPHATE 4 MG: 4 INJECTION, SOLUTION INTRA-ARTICULAR; INTRALESIONAL; INTRAMUSCULAR; INTRAVENOUS; SOFT TISSUE at 04:10

## 2022-10-20 NOTE — PROGRESS NOTES
[facility] - Clinic Note    Subjective      Ms. Viramontes is a 71 y.o. female who presents to clinic for a follow up of chronic conditions.     Insomnia: takes trazodone.     GERD: takes prilosec    Admits to post-nasal drip and sinus congestion.      PMH Sosa has a past medical history of Colitis, COVID-19 (08/2021), GERD (gastroesophageal reflux disease), Recurrent UTI, SVT (supraventricular tachycardia), and Syncope.   PSXH Sosa has a past surgical history that includes Hysterectomy; Bladder suspension (1998); Cholecystectomy; Ablation; Eye surgery; Insertion of midurethral sling (N/A, 7/16/2019); Cystoscopy (N/A, 7/16/2019); Robot-assisted laparoscopic lysis of adhesions using da Nikos Xi (N/A, 7/16/2019); and Oophorectomy.    Sosa's family history includes Breast cancer in her maternal aunt; COPD in her father; Cancer in her mother; Hearing loss in her father and mother; Hypertension in her mother.    Sosa reports that she quit smoking about 16 years ago. Her smoking use included cigarettes. She has a 45.00 pack-year smoking history. She has never used smokeless tobacco. She reports current alcohol use. She reports that she does not use drugs.   ALG Sosa is allergic to celebrex [celecoxib].   MARIANNE Swan has a current medication list which includes the following prescription(s): cetirizine, estradiol, famotidine, magnesium oxide, uribel, multivitamin, naproxen sodium, omeprazole, sucralfate, trazodone, and nitrofurantoin (macrocrystal-monohydrate).     Review of Systems   Constitutional:  Negative for activity change, appetite change, chills, fatigue and fever.   HENT:  Positive for postnasal drip and sore throat. Negative for congestion, ear discharge, ear pain, rhinorrhea, sinus pressure, sinus pain and sneezing.    Eyes:  Negative for visual disturbance.   Respiratory:  Positive for cough. Negative for shortness of breath.    Cardiovascular:  Negative for chest pain, palpitations and leg swelling.  "  Gastrointestinal:  Negative for abdominal pain, nausea and vomiting.   Skin:  Negative for wound.   Neurological:  Negative for dizziness and headaches.   Psychiatric/Behavioral:  Negative for confusion.    Objective     /62 (BP Location: Left arm, Patient Position: Sitting, BP Method: Medium (Automatic))   Pulse 69   Resp 18   Ht 5' 9" (1.753 m)   Wt 88.3 kg (194 lb 9.6 oz)   LMP  (LMP Unknown) Comment: 1998  SpO2 98%   BMI 28.74 kg/m²     Physical Exam   Constitutional: normal appearance. She appears well-developed and well-nourished.  Non-toxic appearance. No distress. She does not appear ill.   HENT:   Head: Normocephalic and atraumatic.   Right Ear: Tympanic membrane, external ear and ear canal normal.   Eyes: Right eye exhibits no discharge. Left eye exhibits no discharge.   Cardiovascular: Normal rate, regular rhythm, normal heart sounds and normal pulses. Exam reveals no gallop and no friction rub.   No murmur heard.Pulmonary:      Effort: Pulmonary effort is normal. No respiratory distress.      Breath sounds: Normal breath sounds. No wheezing, rhonchi or rales.     Abdominal: Normal appearance.   Musculoskeletal:      Cervical back: Neck supple.   Lymphadenopathy:     She has no cervical adenopathy.   Neurological: She is alert.   Skin: Skin is warm and dry. Capillary refill takes less than 2 seconds. She is not diaphoretic.   Psychiatric: Her behavior is normal. Mood, judgment and thought content normal.   Vitals reviewed.   Assessment/Plan     Sosa was seen today for follow-up.    Diagnoses and all orders for this visit:    Acute non-recurrent maxillary sinusitis  -     dexAMETHasone injection 4 mg    Impacted cerumen of left ear  -cerumen removed     Sleep disturbance    Gastroesophageal reflux disease, unspecified whether esophagitis present    -chronic conditions stable. Continue current regimen.      Follow up in about 6 months (around 4/20/2023), or if symptoms worsen or fail to " improve.    Future Appointments   Date Time Provider Department Center   11/7/2022  9:30 AM Estrellita Hernandez PA-C [facility]       Niya Cartwright MD  Family Medicine  [facility]

## 2022-10-20 NOTE — PROGRESS NOTES
Sosa Viramontes arrive to clinic awake and alert.  Pt verified name and .   Allergies reviewed with patient.  Pt given DECADRON via Intramuscular Right upper quad. gluteus per provider orders.    Well tolerated by patient.  denies further needs.    Patient instructed to wait 15 mins after injection.   Patient states no vaccines in the last 14 days.  Vaccine information sheet was given to patient. Patient voiced understanding.    Jazmyn Roy LPN

## 2022-11-27 ENCOUNTER — HOSPITAL ENCOUNTER (EMERGENCY)
Facility: HOSPITAL | Age: 72
Discharge: HOME | End: 2022-11-27
Attending: FAMILY MEDICINE
Payer: MEDICARE

## 2022-11-27 VITALS
RESPIRATION RATE: 10 BRPM | TEMPERATURE: 98 F | HEIGHT: 69 IN | OXYGEN SATURATION: 96 % | BODY MASS INDEX: 28.73 KG/M2 | HEART RATE: 64 BPM | DIASTOLIC BLOOD PRESSURE: 66 MMHG | WEIGHT: 194 LBS | SYSTOLIC BLOOD PRESSURE: 122 MMHG

## 2022-11-27 DIAGNOSIS — R42 VERTIGO: Primary | ICD-10-CM

## 2022-11-27 LAB
ALBUMIN SERPL BCP-MCNC: 3.8 G/DL (ref 3.5–5.2)
ALP SERPL-CCNC: 67 U/L (ref 55–135)
ALT SERPL W/O P-5'-P-CCNC: 18 U/L (ref 10–44)
ANION GAP SERPL CALC-SCNC: 13 MMOL/L (ref 8–16)
AST SERPL-CCNC: 15 U/L (ref 10–40)
BASOPHILS # BLD AUTO: 0.06 K/UL (ref 0–0.2)
BASOPHILS NFR BLD: 0.9 % (ref 0–1.9)
BILIRUB SERPL-MCNC: 0.6 MG/DL (ref 0.1–1)
BUN SERPL-MCNC: 15 MG/DL (ref 8–23)
CALCIUM SERPL-MCNC: 9.2 MG/DL (ref 8.7–10.5)
CHLORIDE SERPL-SCNC: 107 MMOL/L (ref 95–110)
CO2 SERPL-SCNC: 21 MMOL/L (ref 23–29)
CREAT SERPL-MCNC: 0.9 MG/DL (ref 0.5–1.4)
DIFFERENTIAL METHOD: ABNORMAL
EOSINOPHIL # BLD AUTO: 0.2 K/UL (ref 0–0.5)
EOSINOPHIL NFR BLD: 2.7 % (ref 0–8)
ERYTHROCYTE [DISTWIDTH] IN BLOOD BY AUTOMATED COUNT: 13 % (ref 11.5–14.5)
EST. GFR  (NO RACE VARIABLE): >60 ML/MIN/1.73 M^2
GLUCOSE SERPL-MCNC: 128 MG/DL (ref 70–110)
HCT VFR BLD AUTO: 42.5 % (ref 37–48.5)
HCV AB SERPL QL IA: NORMAL
HGB BLD-MCNC: 14.3 G/DL (ref 12–16)
HIV 1+2 AB+HIV1 P24 AG SERPL QL IA: NORMAL
IMM GRANULOCYTES # BLD AUTO: 0.04 K/UL (ref 0–0.04)
IMM GRANULOCYTES NFR BLD AUTO: 0.6 % (ref 0–0.5)
LYMPHOCYTES # BLD AUTO: 1.6 K/UL (ref 1–4.8)
LYMPHOCYTES NFR BLD: 23.8 % (ref 18–48)
MCH RBC QN AUTO: 29.9 PG (ref 27–31)
MCHC RBC AUTO-ENTMCNC: 33.6 G/DL (ref 32–36)
MCV RBC AUTO: 89 FL (ref 82–98)
MONOCYTES # BLD AUTO: 0.5 K/UL (ref 0.3–1)
MONOCYTES NFR BLD: 7 % (ref 4–15)
NEUTROPHILS # BLD AUTO: 4.3 K/UL (ref 1.8–7.7)
NEUTROPHILS NFR BLD: 65 % (ref 38–73)
NRBC BLD-RTO: 0 /100 WBC
PLATELET # BLD AUTO: 197 K/UL (ref 150–450)
PMV BLD AUTO: 11.5 FL (ref 9.2–12.9)
POTASSIUM SERPL-SCNC: 3.9 MMOL/L (ref 3.5–5.1)
PROT SERPL-MCNC: 7 G/DL (ref 6–8.4)
RBC # BLD AUTO: 4.79 M/UL (ref 4–5.4)
SODIUM SERPL-SCNC: 141 MMOL/L (ref 136–145)
WBC # BLD AUTO: 6.56 K/UL (ref 3.9–12.7)

## 2022-11-27 PROCEDURE — 86803 HEPATITIS C AB TEST: CPT | Performed by: FAMILY MEDICINE

## 2022-11-27 PROCEDURE — 25000003 PHARM REV CODE 250: Performed by: FAMILY MEDICINE

## 2022-11-27 PROCEDURE — 70450 CT HEAD WITHOUT CONTRAST: ICD-10-PCS | Mod: 26,,, | Performed by: RADIOLOGY

## 2022-11-27 PROCEDURE — 87389 HIV-1 AG W/HIV-1&-2 AB AG IA: CPT | Performed by: FAMILY MEDICINE

## 2022-11-27 PROCEDURE — 36415 COLL VENOUS BLD VENIPUNCTURE: CPT | Performed by: FAMILY MEDICINE

## 2022-11-27 PROCEDURE — 96374 THER/PROPH/DIAG INJ IV PUSH: CPT

## 2022-11-27 PROCEDURE — 96361 HYDRATE IV INFUSION ADD-ON: CPT

## 2022-11-27 PROCEDURE — 70450 CT HEAD/BRAIN W/O DYE: CPT | Mod: TC

## 2022-11-27 PROCEDURE — 70450 CT HEAD/BRAIN W/O DYE: CPT | Mod: 26,,, | Performed by: RADIOLOGY

## 2022-11-27 PROCEDURE — 80053 COMPREHEN METABOLIC PANEL: CPT | Performed by: FAMILY MEDICINE

## 2022-11-27 PROCEDURE — 85025 COMPLETE CBC W/AUTO DIFF WBC: CPT | Performed by: FAMILY MEDICINE

## 2022-11-27 PROCEDURE — 99285 EMERGENCY DEPT VISIT HI MDM: CPT | Mod: 25

## 2022-11-27 PROCEDURE — 63600175 PHARM REV CODE 636 W HCPCS: Performed by: FAMILY MEDICINE

## 2022-11-27 RX ORDER — MECLIZINE HCL 12.5 MG 12.5 MG/1
50 TABLET ORAL
Status: COMPLETED | OUTPATIENT
Start: 2022-11-27 | End: 2022-11-27

## 2022-11-27 RX ORDER — MECLIZINE HYDROCHLORIDE 25 MG/1
50 TABLET ORAL EVERY 8 HOURS PRN
Qty: 14 TABLET | Refills: 0 | Status: SHIPPED | OUTPATIENT
Start: 2022-11-27 | End: 2023-11-22 | Stop reason: SDUPTHER

## 2022-11-27 RX ORDER — ONDANSETRON 4 MG/1
4 TABLET, ORALLY DISINTEGRATING ORAL EVERY 8 HOURS PRN
Qty: 8 TABLET | Refills: 0 | Status: SHIPPED | OUTPATIENT
Start: 2022-11-27 | End: 2023-05-24

## 2022-11-27 RX ORDER — CIPROFLOXACIN 500 MG/1
500 TABLET ORAL 2 TIMES DAILY WITH MEALS
COMMUNITY
Start: 2022-09-17 | End: 2023-05-24

## 2022-11-27 RX ORDER — ONDANSETRON 2 MG/ML
4 INJECTION INTRAMUSCULAR; INTRAVENOUS
Status: COMPLETED | OUTPATIENT
Start: 2022-11-27 | End: 2022-11-27

## 2022-11-27 RX ADMIN — MECLIZINE HYDROCHLORIDE 50 MG: 12.5 TABLET ORAL at 09:11

## 2022-11-27 RX ADMIN — ONDANSETRON HYDROCHLORIDE 4 MG: 2 SOLUTION INTRAMUSCULAR; INTRAVENOUS at 09:11

## 2022-11-27 RX ADMIN — SODIUM CHLORIDE 500 ML: 0.9 INJECTION, SOLUTION INTRAVENOUS at 08:11

## 2022-11-27 RX ADMIN — MECLIZINE HYDROCHLORIDE 50 MG: 12.5 TABLET ORAL at 08:11

## 2022-11-27 NOTE — ED PROVIDER NOTES
Encounter Date: 11/27/2022       History     Chief Complaint   Patient presents with    Dizziness     Pt states dizziness on and off x2 days. She states that she checked her BP and it was elevated to 181/94.     71-year-old female presents to the ED complaining of sensation of the room spinning she is had it twice in the last 2 days most recently approximately 1 hour prior to arrival she states that it worsens when her head moves or when she is supine and her head moves is improved with her head and torso being still there has been some nausea no vomiting, also concerned about her blood pressure level, she denies any ear pain past medical history is significant for history of GERD and prior SVT with ablation, patient has no known coronary artery disease    Review of patient's allergies indicates:   Allergen Reactions    Celebrex [celecoxib] Other (See Comments)     States feels like having a heart attack     Past Medical History:   Diagnosis Date    Colitis     COVID-19 08/2021    GERD (gastroesophageal reflux disease)     Recurrent UTI     SVT (supraventricular tachycardia)     Syncope      Past Surgical History:   Procedure Laterality Date    ABLATION      BLADDER SUSPENSION  1998    CHOLECYSTECTOMY      CYSTOSCOPY N/A 7/16/2019    Procedure: CYSTOSCOPY;  Surgeon: Jarod Zapien MD;  Location: [facility];  Service: OB/GYN;  Laterality: N/A;    EYE SURGERY      HYSTERECTOMY      1998    INSERTION OF MIDURETHRAL SLING N/A 7/16/2019    Procedure: SLING, MIDURETHRAL;  Surgeon: Jarod Zapien MD;  Location: [facility];  Service: OB/GYN;  Laterality: N/A;    OOPHORECTOMY      ROBOT-ASSISTED LAPAROSCOPIC LYSIS OF ADHESIONS USING DA RHODA XI N/A 7/16/2019    Procedure: XI ROBOTIC LYSIS, ADHESIONS;  Surgeon: Jarod Zapien MD;  Location: [facility];  Service: OB/GYN;  Laterality: N/A;     Family History   Problem Relation Age of Onset    Cancer Mother         Pancreatic    Hearing loss Mother      Hypertension Mother     COPD Father     Hearing loss Father     Breast cancer Maternal Aunt     Vaginal cancer Neg Hx     Endometrial cancer Neg Hx     Cervical cancer Neg Hx     Ovarian cancer Neg Hx      Social History     Tobacco Use    Smoking status: Former     Packs/day: 1.25     Years: 36.00     Pack years: 45.00     Types: Cigarettes     Quit date:      Years since quittin.9    Smokeless tobacco: Never   Substance Use Topics    Alcohol use: Yes     Comment: rarely/ occ.     Drug use: Never     Review of Systems   Constitutional:  Negative for fever.   HENT:  Negative for sore throat.    Respiratory:  Negative for shortness of breath.    Cardiovascular:  Negative for chest pain.   Gastrointestinal:  Negative for nausea.   Genitourinary:  Negative for difficulty urinating and dysuria.   Musculoskeletal:  Negative for back pain.   Skin:  Negative for rash.   Neurological:  Negative for weakness.   Hematological:  Does not bruise/bleed easily.     Physical Exam     Initial Vitals [22 0739]   BP Pulse Resp Temp SpO2   (!) 185/83 61 18 97.9 °F (36.6 °C) 98 %      MAP       --         Physical Exam    Nursing note and vitals reviewed.  Constitutional: She appears well-developed and well-nourished. She is not diaphoretic. No distress.   HENT:   Head: Normocephalic and atraumatic.   Eyes: Pupils are equal, round, and reactive to light. Right eye exhibits no discharge. Left eye exhibits no discharge.   Neck: No tracheal deviation present. No JVD present.   Cardiovascular:      Exam reveals no friction rub.       No murmur heard.  Pulmonary/Chest: No stridor. No respiratory distress. She has no wheezes. She has no rales.   Abdominal: Bowel sounds are normal. She exhibits no distension.   Musculoskeletal:         General: Normal range of motion.     Neurological: She is alert.   Skin: Skin is warm.   Psychiatric: She has a normal mood and affect.       ED Course   Procedures  Labs Reviewed    CBC W/ AUTO DIFFERENTIAL - Abnormal; Notable for the following components:       Result Value    Immature Granulocytes 0.6 (*)     All other components within normal limits   COMPREHENSIVE METABOLIC PANEL - Abnormal; Notable for the following components:    CO2 21 (*)     Glucose 128 (*)     All other components within normal limits   HIV 1 / 2 ANTIBODY    Narrative:     Release to patient->Immediate   HEPATITIS C ANTIBODY    Narrative:     Release to patient->Immediate     EKG Readings: (Independently Interpreted)   Initial Reading: No STEMI. Rhythm: Normal Sinus Rhythm. Heart Rate: 62. Ectopy: No Ectopy. ST Segments: Normal ST Segments. T Waves: Normal.     Imaging Results              CT Head Without Contrast (Final result)  Result time 11/27/22 09:13:04      Final result by Edwina Pollard MD (11/27/22 09:13:04)                   Impression:      No acute abnormality.      Electronically signed by: Edwina Pollard MD  Date:    11/27/2022  Time:    09:13               Narrative:    EXAMINATION:  CT HEAD WITHOUT CONTRAST    CLINICAL HISTORY:  Mental status change, unknown cause;    TECHNIQUE:  Low dose axial CT images obtained throughout the head without intravenous contrast. Sagittal and coronal reconstructions were performed.    COMPARISON:  None.    FINDINGS:  Intracranial compartment:    Ventricles and sulci are normal in size for age without evidence of hydrocephalus. No extra-axial blood or fluid collections.    The brain parenchyma appears normal. No parenchymal mass, hemorrhage, edema or major vascular distribution infarct.    Skull/extracranial contents (limited evaluation): No fracture. Mastoid air cells and paranasal sinuses are essentially clear.                                       Medications   meclizine tablet 50 mg (50 mg Oral Given 11/27/22 0805)   sodium chloride 0.9% bolus 500 mL (0 mLs Intravenous Stopped 11/27/22 0905)   ondansetron injection 4 mg (4 mg Intravenous Given 11/27/22 0923)    meclizine tablet 50 mg (50 mg Oral Given 11/27/22 0923)                              Clinical Impression:   Final diagnoses:  [R42] Vertigo (Primary)      ED Disposition Condition    Discharge Stable          ED Prescriptions       Medication Sig Dispense Start Date End Date Auth. Provider    ondansetron (ZOFRAN-ODT) 4 MG TbDL Take 1 tablet (4 mg total) by mouth every 8 (eight) hours as needed (nausea). 8 tablet 11/27/2022 -- Rohit Acevedo MD    meclizine (ANTIVERT) 25 mg tablet Take 2 tablets (50 mg total) by mouth every 8 (eight) hours as needed for Dizziness. 14 tablet 11/27/2022 -- Rohit Acevedo MD          Follow-up Information    None          Rohit Acevedo MD  11/29/22 3983

## 2023-02-03 NOTE — H&P (VIEW-ONLY)
From: Cruzito Reyes  To: Darrell Correia  Sent: 2/3/2023 1:39 PM CST  Subject: Please call [facility] to request MRI RESULTS from [facility] (see below)     Dear Dr. Correia Staff:   I am writing on behalf of my wife, Marietta Dailey (I am out of town and don't have her sign on codes):     You requested her recent MRI results be sent to you, for further evaluation of the 'mass' in her pancreas.     Her MRI provider, [facility] refuses to do so. They claim they need YOU to request the MRI directly (raul). Please help me and do so. Their contact information:   [facility]  [address] [place] [address], [place] [address]  Phone  [phone]  Fax  [phone]    Thanks!   Red Reyes MD   Urogyn follow up  07/11/2019  .  [facility] 4   [address] [address]    Sosa Viramontes  [id number]  1950      Sosa Viramontes is a 68 y.o.  here for a urogyn preop visit.  Last HPI from 04/25/2019    1)  UI:  (--) LIZETH . (--) UUI.  Does have some extreme urgency and trouble holding when she gets to toilet.   (--) pads. Daytime frequency: Q 1 hours.  Nocturia: Yes: 2/night.   (--) dysuria,  (--) hematuria,  (--) frequent UTIs.  (--) complete bladder emptying. DV/PV to help with mod-large 2nd volume.      2)  POP:  Present. below introitus x years, worsening x few years.  Symptoms:(+)  Discomfort, heaviness, incomplete bladder/bowel evacuation.  Tried pessary--doesn't stay in place.   (--) vaginal bleeding. (+) vaginal discharge/itching. (--) sexually active.  (--) dyspareunia.  (+)  Vaginal dryness.  (+) vaginal estrogen use-started 2 months ago, not using regularly.      3)  BM:  (+) constipation/straining. Has microscopic colitis +polyps--started metamucil 2 T in 1 cup of water. Takes miralax when gets constipated. No stool softener.  Sees GI MD.  (--) chronic diarrhea. (--) hematochezia.  (--) fecal incontinence.  (--) fecal smearing/urgency.  (--) complete evacuation.  Rocks back and forth to evacuate      07/11/2019  [place]  1.  VOIDING PHASE:       a.  Uroflowmetry:  · Prolapse reduction: No  · Voided volume:  441 mL   · Voiding time:   230 seconds  · Max flow:  16 mL/s  · Avg flow:   2 mL/s   · PVR:   125 mL     The overall configuration of this uroflow study was prolonged with elevated PVR.       b.  Pressure flow:  · Prolapse reduction: Yes (pessary)  · Patient was unable to void on toilet,  Catheters were removed, and patient allowed to void on toilet, with 1000 mL produced.  · PVR (calculated):  0 mL     PF unable to be performed, but patient was able to void on toilet with 0 calculated PVR.       2.  FILLING PHASE:  · 1st desire: 41 mL  · Normal  desire:  418 mL  · Strong desire:  723 mL  · Urgency:  750 mL  · Compliance (calculated)  ~1000 mL/cm H20  · EMG activity during filling:  With gradual increase.   · Detrusor contractions observed: No.      3.  URETHRAL FUNCTION/STORAGE PHASE:     a.  WITH prolapse reduction:  · CLPP (150 mL): Negative  at  177 cm H20  · VLPP (150 mL): Negative  at  92 cm H20   · CLPP (300 mL): Positive  at  207 cm H20  · VLPP (300 mL): Positive  at  101 cm H20   · CLPP (450 mL): Positive  at  210 cm H20  · VLPP (450 mL): Positive  at  109 cm H20   · CLPP (600 mL): Positive  at  193 cm H20  · VLPP (600 mL): Positive  at  101 cm H20      These findings are consistent with Positive urodynamic stress incontinence.  Assessment:  UF prolonged with elevated PVR.  PF unable to complete but able to void on toilet with normal PVR.  Compliance normal.  Max capacity 1000 mL.  DO (--).  LIZETH (+).      Cysto    Findings: Urethroscopy:  Normal.  Cystoscopy:  Normal bladder mucosa, bilateral ureteral flow was noted.     Assessment: Essentially normal cystourethroscopy      Past Medical History:   Diagnosis Date    Colitis     GERD (gastroesophageal reflux disease)        Past Surgical History:   Procedure Laterality Date    ABLATION      BLADDER SUSPENSION  1998    CHOLECYSTECTOMY      HYSTERECTOMY      1998       Current Outpatient Medications   Medication Sig    APRISO 0.375 gram Cp24 TK 4 CS PO QAM    omeprazole (PRILOSEC) 40 MG capsule TK 1 C PO D    ranitidine (ZANTAC) 300 MG tablet TK 1 T PO HS  WITH THE DAYAN MEAL    traZODone (DESYREL) 100 MG tablet TK 1 T PO HS     No current facility-administered medications for this visit.        Well woman:  Pap test: post EVELIO.  History of abnormal paps: No.  History of STIs:  No  Mammogram: Date of last: 2018.  Result: Normal per report.   Colonoscopy: Date of last: 1/2017.  Result:  Normal per report.  Repeat due:  2020 per local GI.    DEXA:  Date of last: 2017.  Result:  Normal per report.   Repeat due:  Per PCP/GY    ROS:  As per HPI.      Exam  There were no vitals taken for this visit.  General: alert and oriented, no acute distress  Respiratory: normal respiratory effort  Abd: soft, non-tender, non-distended    Pelvic--deferred    Impression  1. Preoperative exam for gynecologic surgery     2. Vaginal vault prolapse     3. Midline cystocele     4. Rectocele     5. Vaginal atrophy       We reviewed the above issues and discussed options for short-term versus long-term management of her problems.   Plan:   1. Patient consented with Dr. Zapien for robotic assisted laparoscopic sacrocolpopexy with synthetic mesh, possible anterior/posterior repair with perineorrhaphy, possible laparotomy, possible uterosacral suspension, placement of synthetic midurethral sling, and cystourethroscopy.   R/B/A reviewed. Specific risks reviewed include:  infection, bleeding, need for blood transfusion, damage to surrounding structures, anesthesia risks, death, heart attack, stroke, mesh erosion/extrusion, pain, dyspareunia, urinary retention, voiding dysfunction, urinary incontinence, exacerbation of urinary urge incontinence, and need for further surgeries.  We reviewed potential for failure of POP defect repair and need for future surgery, with no way of predicting risk.  She understands success rate of ASC approaches 85%.  Success rate of midurethral sling for LIZETH was reviewed as 80-85%, and she understands that this will not necessarily impact other types of urinary incontinence.  Alternatives reviewed include: pessary/PT for POP and pessary/periurethral injections/PT/medication for LIZETH.    2. T&S  3. Preoperative appointment with PCP or cardiology: Yes -   4. VTE Prophylaxis:  heparin 5000 u SQ TID (1st dose 2hrs preop) + SCDs  5. Patient instructed on Magnesium citrate and chlorahexadine/dial soap prep to perform day before & AM of surgery.   6. Proceed to OR for above-mentioned procedure.      30 minutes were spent  in face to face time with this patient  90 % of this time was spent in counseling and/or coordination of care      PHILIP Salamanca-BC  [facility]  Division of Female Pelvic Medicine and Reconstructive Surgery  Department of Obstetrics & Gynecology

## 2023-05-09 DIAGNOSIS — Z12.31 SCREENING MAMMOGRAM FOR BREAST CANCER: Primary | ICD-10-CM

## 2023-05-24 ENCOUNTER — OFFICE VISIT (OUTPATIENT)
Dept: FAMILY MEDICINE | Facility: CLINIC | Age: 73
End: 2023-05-24
Payer: MEDICARE

## 2023-05-24 ENCOUNTER — LAB VISIT (OUTPATIENT)
Dept: LAB | Facility: HOSPITAL | Age: 73
End: 2023-05-24
Attending: FAMILY MEDICINE
Payer: MEDICARE

## 2023-05-24 VITALS
BODY MASS INDEX: 28.58 KG/M2 | DIASTOLIC BLOOD PRESSURE: 78 MMHG | TEMPERATURE: 98 F | HEIGHT: 69 IN | HEART RATE: 71 BPM | WEIGHT: 193 LBS | OXYGEN SATURATION: 96 % | RESPIRATION RATE: 18 BRPM | SYSTOLIC BLOOD PRESSURE: 128 MMHG

## 2023-05-24 DIAGNOSIS — Z51.81 ENCOUNTER FOR MEDICATION MONITORING: ICD-10-CM

## 2023-05-24 DIAGNOSIS — K64.8 INTERNAL AND EXTERNAL HEMORRHOIDS WITHOUT COMPLICATION: ICD-10-CM

## 2023-05-24 DIAGNOSIS — N30.90 RECURRENT CYSTITIS: ICD-10-CM

## 2023-05-24 DIAGNOSIS — G47.9 SLEEP DISTURBANCE: ICD-10-CM

## 2023-05-24 DIAGNOSIS — I47.10 SVT (SUPRAVENTRICULAR TACHYCARDIA): ICD-10-CM

## 2023-05-24 DIAGNOSIS — R73.9 HYPERGLYCEMIA: ICD-10-CM

## 2023-05-24 DIAGNOSIS — K64.4 INTERNAL AND EXTERNAL HEMORRHOIDS WITHOUT COMPLICATION: ICD-10-CM

## 2023-05-24 DIAGNOSIS — D12.6 ADENOMATOUS POLYP OF COLON, UNSPECIFIED PART OF COLON: ICD-10-CM

## 2023-05-24 DIAGNOSIS — K21.9 GASTROESOPHAGEAL REFLUX DISEASE, UNSPECIFIED WHETHER ESOPHAGITIS PRESENT: Primary | ICD-10-CM

## 2023-05-24 DIAGNOSIS — R09.81 SINUS CONGESTION: ICD-10-CM

## 2023-05-24 DIAGNOSIS — N95.2 VAGINAL ATROPHY: ICD-10-CM

## 2023-05-24 LAB
ALBUMIN SERPL BCP-MCNC: 3.9 G/DL (ref 3.5–5.2)
ALP SERPL-CCNC: 62 U/L (ref 55–135)
ALT SERPL W/O P-5'-P-CCNC: 22 U/L (ref 10–44)
ANION GAP SERPL CALC-SCNC: 9 MMOL/L (ref 8–16)
AST SERPL-CCNC: 20 U/L (ref 10–40)
BASOPHILS # BLD AUTO: 0.06 K/UL (ref 0–0.2)
BASOPHILS NFR BLD: 0.7 % (ref 0–1.9)
BILIRUB SERPL-MCNC: 0.5 MG/DL (ref 0.1–1)
BUN SERPL-MCNC: 15 MG/DL (ref 8–23)
CALCIUM SERPL-MCNC: 9.7 MG/DL (ref 8.7–10.5)
CHLORIDE SERPL-SCNC: 106 MMOL/L (ref 95–110)
CO2 SERPL-SCNC: 25 MMOL/L (ref 23–29)
CREAT SERPL-MCNC: 0.9 MG/DL (ref 0.5–1.4)
DIFFERENTIAL METHOD: NORMAL
EOSINOPHIL # BLD AUTO: 0.2 K/UL (ref 0–0.5)
EOSINOPHIL NFR BLD: 2.4 % (ref 0–8)
ERYTHROCYTE [DISTWIDTH] IN BLOOD BY AUTOMATED COUNT: 13.2 % (ref 11.5–14.5)
EST. GFR  (NO RACE VARIABLE): >60 ML/MIN/1.73 M^2
ESTIMATED AVG GLUCOSE: 117 MG/DL (ref 68–131)
GLUCOSE SERPL-MCNC: 98 MG/DL (ref 70–110)
HBA1C MFR BLD: 5.7 % (ref 4–5.6)
HCT VFR BLD AUTO: 41.8 % (ref 37–48.5)
HGB BLD-MCNC: 14.1 G/DL (ref 12–16)
IMM GRANULOCYTES # BLD AUTO: 0.03 K/UL (ref 0–0.04)
IMM GRANULOCYTES NFR BLD AUTO: 0.4 % (ref 0–0.5)
LYMPHOCYTES # BLD AUTO: 1.8 K/UL (ref 1–4.8)
LYMPHOCYTES NFR BLD: 21.8 % (ref 18–48)
MCH RBC QN AUTO: 30.2 PG (ref 27–31)
MCHC RBC AUTO-ENTMCNC: 33.7 G/DL (ref 32–36)
MCV RBC AUTO: 90 FL (ref 82–98)
MONOCYTES # BLD AUTO: 0.7 K/UL (ref 0.3–1)
MONOCYTES NFR BLD: 8.2 % (ref 4–15)
NEUTROPHILS # BLD AUTO: 5.5 K/UL (ref 1.8–7.7)
NEUTROPHILS NFR BLD: 66.5 % (ref 38–73)
NRBC BLD-RTO: 0 /100 WBC
PLATELET # BLD AUTO: 221 K/UL (ref 150–450)
PMV BLD AUTO: 11 FL (ref 9.2–12.9)
POTASSIUM SERPL-SCNC: 4.1 MMOL/L (ref 3.5–5.1)
PROT SERPL-MCNC: 6.9 G/DL (ref 6–8.4)
RBC # BLD AUTO: 4.67 M/UL (ref 4–5.4)
SODIUM SERPL-SCNC: 140 MMOL/L (ref 136–145)
WBC # BLD AUTO: 8.26 K/UL (ref 3.9–12.7)

## 2023-05-24 PROCEDURE — 1101F PR PT FALLS ASSESS DOC 0-1 FALLS W/OUT INJ PAST YR: ICD-10-PCS | Mod: CPTII,S$GLB,, | Performed by: FAMILY MEDICINE

## 2023-05-24 PROCEDURE — 3288F PR FALLS RISK ASSESSMENT DOCUMENTED: ICD-10-PCS | Mod: CPTII,S$GLB,, | Performed by: FAMILY MEDICINE

## 2023-05-24 PROCEDURE — 36415 COLL VENOUS BLD VENIPUNCTURE: CPT | Performed by: FAMILY MEDICINE

## 2023-05-24 PROCEDURE — 83036 HEMOGLOBIN GLYCOSYLATED A1C: CPT | Performed by: FAMILY MEDICINE

## 2023-05-24 PROCEDURE — 3074F PR MOST RECENT SYSTOLIC BLOOD PRESSURE < 130 MM HG: ICD-10-PCS | Mod: CPTII,S$GLB,, | Performed by: FAMILY MEDICINE

## 2023-05-24 PROCEDURE — 1157F PR ADVANCE CARE PLAN OR EQUIV PRESENT IN MEDICAL RECORD: ICD-10-PCS | Mod: CPTII,S$GLB,, | Performed by: FAMILY MEDICINE

## 2023-05-24 PROCEDURE — 3008F PR BODY MASS INDEX (BMI) DOCUMENTED: ICD-10-PCS | Mod: CPTII,S$GLB,, | Performed by: FAMILY MEDICINE

## 2023-05-24 PROCEDURE — 1157F ADVNC CARE PLAN IN RCRD: CPT | Mod: CPTII,S$GLB,, | Performed by: FAMILY MEDICINE

## 2023-05-24 PROCEDURE — 3078F PR MOST RECENT DIASTOLIC BLOOD PRESSURE < 80 MM HG: ICD-10-PCS | Mod: CPTII,S$GLB,, | Performed by: FAMILY MEDICINE

## 2023-05-24 PROCEDURE — 99214 PR OFFICE/OUTPT VISIT, EST, LEVL IV, 30-39 MIN: ICD-10-PCS | Mod: S$GLB,,, | Performed by: FAMILY MEDICINE

## 2023-05-24 PROCEDURE — 1159F PR MEDICATION LIST DOCUMENTED IN MEDICAL RECORD: ICD-10-PCS | Mod: CPTII,S$GLB,, | Performed by: FAMILY MEDICINE

## 2023-05-24 PROCEDURE — 3074F SYST BP LT 130 MM HG: CPT | Mod: CPTII,S$GLB,, | Performed by: FAMILY MEDICINE

## 2023-05-24 PROCEDURE — 80053 COMPREHEN METABOLIC PANEL: CPT | Performed by: FAMILY MEDICINE

## 2023-05-24 PROCEDURE — 99999 PR PBB SHADOW E&M-EST. PATIENT-LVL IV: CPT | Mod: PBBFAC,,, | Performed by: FAMILY MEDICINE

## 2023-05-24 PROCEDURE — 1126F PR PAIN SEVERITY QUANTIFIED, NO PAIN PRESENT: ICD-10-PCS | Mod: CPTII,S$GLB,, | Performed by: FAMILY MEDICINE

## 2023-05-24 PROCEDURE — 1126F AMNT PAIN NOTED NONE PRSNT: CPT | Mod: CPTII,S$GLB,, | Performed by: FAMILY MEDICINE

## 2023-05-24 PROCEDURE — 1159F MED LIST DOCD IN RCRD: CPT | Mod: CPTII,S$GLB,, | Performed by: FAMILY MEDICINE

## 2023-05-24 PROCEDURE — 99999 PR PBB SHADOW E&M-EST. PATIENT-LVL IV: ICD-10-PCS | Mod: PBBFAC,,, | Performed by: FAMILY MEDICINE

## 2023-05-24 PROCEDURE — 3078F DIAST BP <80 MM HG: CPT | Mod: CPTII,S$GLB,, | Performed by: FAMILY MEDICINE

## 2023-05-24 PROCEDURE — 3008F BODY MASS INDEX DOCD: CPT | Mod: CPTII,S$GLB,, | Performed by: FAMILY MEDICINE

## 2023-05-24 PROCEDURE — 85025 COMPLETE CBC W/AUTO DIFF WBC: CPT | Performed by: FAMILY MEDICINE

## 2023-05-24 PROCEDURE — 3288F FALL RISK ASSESSMENT DOCD: CPT | Mod: CPTII,S$GLB,, | Performed by: FAMILY MEDICINE

## 2023-05-24 PROCEDURE — 1101F PT FALLS ASSESS-DOCD LE1/YR: CPT | Mod: CPTII,S$GLB,, | Performed by: FAMILY MEDICINE

## 2023-05-24 PROCEDURE — 99214 OFFICE O/P EST MOD 30 MIN: CPT | Mod: S$GLB,,, | Performed by: FAMILY MEDICINE

## 2023-05-24 RX ORDER — MONTELUKAST SODIUM 10 MG/1
10 TABLET ORAL NIGHTLY
Qty: 90 TABLET | Refills: 3 | Status: SHIPPED | OUTPATIENT
Start: 2023-05-24 | End: 2023-08-22

## 2023-05-24 RX ORDER — TRAZODONE HYDROCHLORIDE 100 MG/1
TABLET ORAL
Qty: 90 TABLET | Refills: 3 | Status: SHIPPED | OUTPATIENT
Start: 2023-05-24

## 2023-05-24 RX ORDER — FAMOTIDINE 40 MG/1
40 TABLET, FILM COATED ORAL DAILY
Qty: 90 TABLET | Refills: 3 | Status: SHIPPED | OUTPATIENT
Start: 2023-05-24

## 2023-05-24 RX ORDER — MINERAL OIL
180 ENEMA (ML) RECTAL DAILY
Qty: 90 TABLET | Refills: 3 | Status: SHIPPED | OUTPATIENT
Start: 2023-05-24 | End: 2024-05-23

## 2023-05-24 RX ORDER — CETIRIZINE HYDROCHLORIDE 10 MG/1
10 TABLET ORAL DAILY
Qty: 90 TABLET | Refills: 3 | Status: SHIPPED | OUTPATIENT
Start: 2023-05-24 | End: 2023-05-24 | Stop reason: ALTCHOICE

## 2023-05-24 RX ORDER — OMEPRAZOLE 40 MG/1
CAPSULE, DELAYED RELEASE ORAL
Qty: 90 CAPSULE | Refills: 3 | Status: SHIPPED | OUTPATIENT
Start: 2023-05-24 | End: 2023-11-22 | Stop reason: SDUPTHER

## 2023-05-24 RX ORDER — ESTRADIOL 0.1 MG/G
CREAM VAGINAL
Qty: 42.5 G | Refills: 1 | Status: SHIPPED | OUTPATIENT
Start: 2023-05-24

## 2023-05-24 NOTE — PROGRESS NOTES
[facility] - Clinic Note    Subjective      Ms. Viramontes is a 72 y.o. female who presents to clinic  for a follow up of chronic conditions.      Insomnia: takes trazodone.      GERD: takes prilosec and pepcid    Recurrent cystitis: takes uribel    Uses estrace vaginal cream for vaginal atrophy    Followed by ENT, Dr. Anthony for recurrent sinus infections.   Has been taking zyrtec, mucinex, and flonase with no improvement.    Due for mammogram.    Would like a referral to Dr. De La Cruz for c-scope and EGD. Was being followed by Dr. Zapata at [facility].    [facility] Sosa has a past medical history of Colitis, COVID-19 (08/2021), GERD (gastroesophageal reflux disease), Recurrent UTI, SVT (supraventricular tachycardia), and Syncope.   PSXH Sosa has a past surgical history that includes Hysterectomy; Bladder suspension (1998); Cholecystectomy; Ablation; Eye surgery; Insertion of midurethral sling (N/A, 7/16/2019); Cystoscopy (N/A, 7/16/2019); Robot-assisted laparoscopic lysis of adhesions using da Nikos Xi (N/A, 7/16/2019); and Oophorectomy.    Sosa's family history includes Breast cancer in her maternal aunt; COPD in her father; Cancer in her mother; Hearing loss in her father and mother; Hypertension in her mother.   SH Sosa reports that she quit smoking about 17 years ago. Her smoking use included cigarettes. She has a 45.00 pack-year smoking history. She has never used smokeless tobacco. She reports current alcohol use. She reports that she does not use drugs.   ALG Sosa is allergic to celebrex [celecoxib].   MED Sosa has a current medication list which includes the following prescription(s): magnesium oxide, meclizine, uribel, multivitamin, naproxen sodium, sucralfate, estradiol, famotidine, fexofenadine, montelukast, omeprazole, and trazodone.     Review of Systems   Constitutional:  Negative for activity change, appetite change, chills, fatigue and fever.   HENT:  Positive for congestion, postnasal drip, rhinorrhea and  "sinus pressure. Negative for ear discharge and ear pain.    Eyes:  Negative for visual disturbance.   Respiratory:  Negative for cough and shortness of breath.    Cardiovascular:  Negative for chest pain, palpitations and leg swelling.   Gastrointestinal:  Negative for abdominal pain, nausea and vomiting.   Skin:  Negative for wound.   Neurological:  Negative for dizziness and headaches.   Psychiatric/Behavioral:  Negative for confusion.    Objective     /78 (BP Location: Left arm, Patient Position: Sitting, BP Method: Large (Manual))   Pulse 71   Temp 97.8 °F (36.6 °C) (Temporal)   Resp 18   Ht 5' 9" (1.753 m)   Wt 87.5 kg (193 lb)   LMP  (LMP Unknown) Comment: 1998  SpO2 96%   BMI 28.50 kg/m²     Physical Exam   Constitutional: normal appearance. She appears well-developed and well-nourished.  Non-toxic appearance. No distress. She does not appear ill.   HENT:   Head: Normocephalic and atraumatic.   Eyes: Right eye exhibits no discharge. Left eye exhibits no discharge.   Cardiovascular: Normal rate, regular rhythm, normal heart sounds and normal pulses. Exam reveals no gallop and no friction rub.   No murmur heard.Pulmonary:      Effort: Pulmonary effort is normal. No respiratory distress.      Breath sounds: Normal breath sounds. No wheezing, rhonchi or rales.     Abdominal: Normal appearance.   Musculoskeletal:      Cervical back: Neck supple.   Lymphadenopathy:     She has no cervical adenopathy.   Neurological: She is alert.   Skin: Skin is warm and dry. Capillary refill takes less than 2 seconds. She is not diaphoretic.   Psychiatric: Her behavior is normal. Mood, judgment and thought content normal.   Vitals reviewed.   Assessment/Plan     Sosa was seen today for follow-up.    Diagnoses and all orders for this visit:    Gastroesophageal reflux disease, unspecified whether esophagitis present  -     famotidine (PEPCID) 40 MG tablet; Take 1 tablet (40 mg total) by mouth once daily.  -     " omeprazole (PRILOSEC) 40 MG capsule; TK 1 C PO D  -stable. Continue current regimen    Vaginal atrophy  -     estradioL (ESTRACE) 0.01 % (0.1 mg/gram) vaginal cream; Use 1/2  gram per vagina nightly x 2 weeks then 3 times per week  - Stable. Continue estrace    Sleep disturbance  -     traZODone (DESYREL) 100 MG tablet; TAKE 1 TABLET BY MOUTH AT BEDTIME  - Stable and improved on trazodone.    Recurrent cystitis  -uribel prn    Sinus congestion  -     fexofenadine (ALLEGRA) 180 MG tablet; Take 1 tablet (180 mg total) by mouth once daily.  -     montelukast (SINGULAIR) 10 mg tablet; Take 1 tablet (10 mg total) by mouth every evening.  - Switch zyrtec to allegra and add singular and monitor for improvement.     Encounter for medication monitoring  -     Comprehensive metabolic panel; Future  -     CBC auto differential; Future    Hyperglycemia  -     Hemoglobin A1c; Future    Adenomatous polyp of colon, unspecified part of colon  -     Ambulatory referral/consult to General Surgery; Future    Internal and external hemorrhoids without complication  -     Ambulatory referral/consult to General Surgery; Future    SVT (supraventricular tachycardia)  -history of svt. Had ablation in the past. No issues since.    Follow up in about 6 months (around 11/24/2023), or if symptoms worsen or fail to improve.    Future Appointments   Date Time Provider Department Center   11/24/2023  3:00 PM Niya Cartwright MD [facility]       Niya Cartwright MD  Family Medicine  [facility]

## 2023-05-25 ENCOUNTER — TELEPHONE (OUTPATIENT)
Dept: FAMILY MEDICINE | Facility: CLINIC | Age: 73
End: 2023-05-25
Payer: MEDICARE

## 2023-05-25 NOTE — TELEPHONE ENCOUNTER
----- Message from Niya Cartwright MD sent at 5/25/2023 12:58 PM CDT -----  Your screening for diabetes revealed that your are prediabetic. Your A1C level is 5.7 which puts you at risk for diabetes. The way we treat this is with lifestyle modifications such as weight loss and dietary changes with a low carb, low fat, and high protein diet.We can recheck this in 6 months.     Electrolytes, kidney function, liver enzymes, and blood count are all within normal limits.

## 2023-06-13 ENCOUNTER — HOSPITAL ENCOUNTER (OUTPATIENT)
Dept: RADIOLOGY | Facility: HOSPITAL | Age: 73
Discharge: HOME | End: 2023-06-13
Attending: FAMILY MEDICINE
Payer: MEDICARE

## 2023-06-13 DIAGNOSIS — Z12.31 SCREENING MAMMOGRAM FOR BREAST CANCER: ICD-10-CM

## 2023-06-13 PROCEDURE — 77063 MAMMO DIGITAL SCREENING BILAT WITH TOMO: ICD-10-PCS | Mod: 26,,, | Performed by: RADIOLOGY

## 2023-06-13 PROCEDURE — 77067 MAMMO DIGITAL SCREENING BILAT WITH TOMO: ICD-10-PCS | Mod: 26,,, | Performed by: RADIOLOGY

## 2023-06-13 PROCEDURE — 77067 SCR MAMMO BI INCL CAD: CPT | Mod: 26,,, | Performed by: RADIOLOGY

## 2023-06-13 PROCEDURE — 77063 BREAST TOMOSYNTHESIS BI: CPT | Mod: 26,,, | Performed by: RADIOLOGY

## 2023-06-13 PROCEDURE — 77067 SCR MAMMO BI INCL CAD: CPT | Mod: TC

## 2023-06-22 ENCOUNTER — PATIENT MESSAGE (OUTPATIENT)
Dept: FAMILY MEDICINE | Facility: CLINIC | Age: 73
End: 2023-06-22
Payer: MEDICARE

## 2023-07-13 ENCOUNTER — OFFICE VISIT (OUTPATIENT)
Dept: OTOLARYNGOLOGY | Facility: CLINIC | Age: 73
End: 2023-07-13
Payer: MEDICARE

## 2023-07-13 ENCOUNTER — TELEPHONE (OUTPATIENT)
Dept: OTOLARYNGOLOGY | Facility: CLINIC | Age: 73
End: 2023-07-13
Payer: MEDICARE

## 2023-07-13 VITALS
DIASTOLIC BLOOD PRESSURE: 72 MMHG | WEIGHT: 193.19 LBS | BODY MASS INDEX: 28.53 KG/M2 | SYSTOLIC BLOOD PRESSURE: 149 MMHG

## 2023-07-13 DIAGNOSIS — J30.9 CHRONIC ALLERGIC RHINITIS: Primary | ICD-10-CM

## 2023-07-13 DIAGNOSIS — R49.0 HOARSENESS: ICD-10-CM

## 2023-07-13 DIAGNOSIS — K21.9 LARYNGOPHARYNGEAL REFLUX (LPR): ICD-10-CM

## 2023-07-13 PROCEDURE — 3008F BODY MASS INDEX DOCD: CPT | Mod: CPTII,S$GLB,, | Performed by: OTOLARYNGOLOGY

## 2023-07-13 PROCEDURE — 3044F HG A1C LEVEL LT 7.0%: CPT | Mod: CPTII,S$GLB,, | Performed by: OTOLARYNGOLOGY

## 2023-07-13 PROCEDURE — 1157F PR ADVANCE CARE PLAN OR EQUIV PRESENT IN MEDICAL RECORD: ICD-10-PCS | Mod: CPTII,S$GLB,, | Performed by: OTOLARYNGOLOGY

## 2023-07-13 PROCEDURE — 1101F PT FALLS ASSESS-DOCD LE1/YR: CPT | Mod: CPTII,S$GLB,, | Performed by: OTOLARYNGOLOGY

## 2023-07-13 PROCEDURE — 3078F DIAST BP <80 MM HG: CPT | Mod: CPTII,S$GLB,, | Performed by: OTOLARYNGOLOGY

## 2023-07-13 PROCEDURE — 31575 PR LARYNGOSCOPY, FLEXIBLE; DIAGNOSTIC: ICD-10-PCS | Mod: S$GLB,,, | Performed by: OTOLARYNGOLOGY

## 2023-07-13 PROCEDURE — 3288F FALL RISK ASSESSMENT DOCD: CPT | Mod: CPTII,S$GLB,, | Performed by: OTOLARYNGOLOGY

## 2023-07-13 PROCEDURE — 1160F RVW MEDS BY RX/DR IN RCRD: CPT | Mod: CPTII,S$GLB,, | Performed by: OTOLARYNGOLOGY

## 2023-07-13 PROCEDURE — 3288F PR FALLS RISK ASSESSMENT DOCUMENTED: ICD-10-PCS | Mod: CPTII,S$GLB,, | Performed by: OTOLARYNGOLOGY

## 2023-07-13 PROCEDURE — 99203 PR OFFICE/OUTPT VISIT, NEW, LEVL III, 30-44 MIN: ICD-10-PCS | Mod: 25,S$GLB,, | Performed by: OTOLARYNGOLOGY

## 2023-07-13 PROCEDURE — 1157F ADVNC CARE PLAN IN RCRD: CPT | Mod: CPTII,S$GLB,, | Performed by: OTOLARYNGOLOGY

## 2023-07-13 PROCEDURE — 99203 OFFICE O/P NEW LOW 30 MIN: CPT | Mod: 25,S$GLB,, | Performed by: OTOLARYNGOLOGY

## 2023-07-13 PROCEDURE — 3044F PR MOST RECENT HEMOGLOBIN A1C LEVEL <7.0%: ICD-10-PCS | Mod: CPTII,S$GLB,, | Performed by: OTOLARYNGOLOGY

## 2023-07-13 PROCEDURE — 3008F PR BODY MASS INDEX (BMI) DOCUMENTED: ICD-10-PCS | Mod: CPTII,S$GLB,, | Performed by: OTOLARYNGOLOGY

## 2023-07-13 PROCEDURE — 1126F PR PAIN SEVERITY QUANTIFIED, NO PAIN PRESENT: ICD-10-PCS | Mod: CPTII,S$GLB,, | Performed by: OTOLARYNGOLOGY

## 2023-07-13 PROCEDURE — 31575 DIAGNOSTIC LARYNGOSCOPY: CPT | Mod: S$GLB,,, | Performed by: OTOLARYNGOLOGY

## 2023-07-13 PROCEDURE — 1159F MED LIST DOCD IN RCRD: CPT | Mod: CPTII,S$GLB,, | Performed by: OTOLARYNGOLOGY

## 2023-07-13 PROCEDURE — 99999 PR PBB SHADOW E&M-EST. PATIENT-LVL III: CPT | Mod: PBBFAC,,, | Performed by: OTOLARYNGOLOGY

## 2023-07-13 PROCEDURE — 1160F PR REVIEW ALL MEDS BY PRESCRIBER/CLIN PHARMACIST DOCUMENTED: ICD-10-PCS | Mod: CPTII,S$GLB,, | Performed by: OTOLARYNGOLOGY

## 2023-07-13 PROCEDURE — 3077F SYST BP >= 140 MM HG: CPT | Mod: CPTII,S$GLB,, | Performed by: OTOLARYNGOLOGY

## 2023-07-13 PROCEDURE — 1159F PR MEDICATION LIST DOCUMENTED IN MEDICAL RECORD: ICD-10-PCS | Mod: CPTII,S$GLB,, | Performed by: OTOLARYNGOLOGY

## 2023-07-13 PROCEDURE — 99999 PR PBB SHADOW E&M-EST. PATIENT-LVL III: ICD-10-PCS | Mod: PBBFAC,,, | Performed by: OTOLARYNGOLOGY

## 2023-07-13 PROCEDURE — 3078F PR MOST RECENT DIASTOLIC BLOOD PRESSURE < 80 MM HG: ICD-10-PCS | Mod: CPTII,S$GLB,, | Performed by: OTOLARYNGOLOGY

## 2023-07-13 PROCEDURE — 1101F PR PT FALLS ASSESS DOC 0-1 FALLS W/OUT INJ PAST YR: ICD-10-PCS | Mod: CPTII,S$GLB,, | Performed by: OTOLARYNGOLOGY

## 2023-07-13 PROCEDURE — 1126F AMNT PAIN NOTED NONE PRSNT: CPT | Mod: CPTII,S$GLB,, | Performed by: OTOLARYNGOLOGY

## 2023-07-13 PROCEDURE — 3077F PR MOST RECENT SYSTOLIC BLOOD PRESSURE >= 140 MM HG: ICD-10-PCS | Mod: CPTII,S$GLB,, | Performed by: OTOLARYNGOLOGY

## 2023-07-13 RX ORDER — OMEPRAZOLE AND SODIUM BICARBONATE 40; 1100 MG/1; MG/1
1 CAPSULE ORAL NIGHTLY
Qty: 30 CAPSULE | Refills: 11
Start: 2023-07-13 | End: 2024-04-02

## 2023-07-13 NOTE — PROGRESS NOTES
Subjective:       Patient ID: Sosa Viramontes is a 72 y.o. female.    Chief Complaint: Sore Throat (Pt c/o sore throat, cough, and loss of voice for a few months. 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  It waxes and wanes to some extent she is been treated for allergies although she is never had much in the way of allergy trouble she does not have a lot of nasal or sinus symptoms with the exception of a feeling of postnasal drip she does have coughing paroxysms at times and notably she has a history of Williamson's esophagus has been on medicines for many years for this her last scope exam was three years ago and it showed a little residual it apparently had cleared up at some point.  She quit smoking in 2006 and prior to that time when she was smoking she did have some voice trouble but it all cleared up when she quit and had been doing quite well until the past year or so.    She takes omeprazole correctly before breakfast in the morning in addition to multiple other types of supplements in the morning.    Sore Throat         Objective:      ENT Physical Exam  Constitutional  Appearance: patient appears well-developed, well-nourished and well-groomed,  Communication/Voice: communication appropriate for developmental age; Communication comments: Mild but obvious hoarseness  Head and Face  Appearance: head appears normal, face appears normal and face appears atraumatic;  Salivary: glands normal;  Ear  Hearing: intact;  Auricles: right auricle normal; left auricle normal;  Ear Canals: right ear canal normal; left ear canal normal;  Tympanic Membranes: right tympanic membrane normal; left tympanic membrane normal;  Nose  External Nose: nares patent bilaterally; external nose normal;  Internal Nose: nasal mucosa normal; septum normal; bilateral inferior turbinates normal;  Oral  Cavity/Oropharynx  Lips: normal;  Teeth: normal;  Gums: gingiva normal;  Tongue: normal;  Oral mucosa: normal;  Hard palate: normal;  Soft palate: normal;  Tonsils: normal;  Base of Tongue: normal;  Posterior pharyngeal wall: normal;  Nasopharynx/Hypopharynx/Larynx  NP/HP/Lx comments: We discussed the benefits of a fiberoptic exam that include a better view of the larynx a better view of the anterior aspect of the larynx a more magnified view of the larynx hypopharynx and nasopharynx in the context of the patient's particular complaint and the patient wishes to proceed with this minor examination.    Afrin and lidocaine were atomized into the nasal airway 5 or so minutes were given for the decongestant and anesthetic to take effect and then the flexible fiberoptic laryngoscope was passed through the nasal cavity and the exam proceeded.     The scope was passed on the right side it passed under a nasal septal spur to the right.  A good view into the nasopharynx showed it to be normal with no inflammation or evidence of nasal pharyngitis.  The scope passed easily into her hypopharynx and larynx view of her larynx was easy to achieve.  Her vocal cords move normally and she was able to phonate as I examined.  There was no evidence of a vocal nodule or polyp in the membranous portion of the vocal cords.  Her arytenoids are not red but there was fairly clear evidence of scarring perhaps from previous ulcerations in this area and they were adjacent to each other as is typical but not particularly inflamed today.  There was no pooling  Neck  Neck: neck normal; neck palpation normal;  Thyroid: thyroid normal;  Lymphatic  Palpation: lymph nodes normal;        Assessment:       1. Chronic allergic rhinitis    2. Laryngopharyngeal reflux (LPR)    3. Hoarseness         Plan:          Given her history and some exam the finding on exam although they were not dramatic I think she needs to probably double up on PPIs for a month or  six weeks just see how much benefit we might get to her voice.  Given her long history of these medications and her current use of a normal dose taken correctly I wanted to try Zegerid© before bed, it is more economical for her to purchase the equate brand of over-the-counter preparation and take two before bed and we discussed this.  She is going to communicate with me in a month or so about her progress and we will discuss getting back to once a day.  There maybe a role for her to use this preparation before bed long term instead of her morning dose once we go to once a day as some patients simply do not get as good absorption as others and this preparation is absorbed through  a different mechanism.

## 2023-07-13 NOTE — TELEPHONE ENCOUNTER
Called pt back. Scheduled with Dr. Juarez in [place] today (pt lives in [place]). Thanks, Chantell

## 2023-07-13 NOTE — TELEPHONE ENCOUNTER
----- Message from Elis Stephen sent at 7/13/2023  8:51 AM CDT -----  Contact: pt  Type:  Same Day Appointment Request    Caller is requesting a same day appointment.  Caller declined first available appointment listed below.    Name of Caller:Pt  When is the first available appointment?8/17  Symptoms:Throat issues   Best Call Back Number:[phone]  Additional Information: Pt states that she need a callback as soon as possible. States that she has been having throat issues and issues with speaking and need to be seen today. Please advise thank you

## 2023-10-19 ENCOUNTER — PATIENT MESSAGE (OUTPATIENT)
Dept: FAMILY MEDICINE | Facility: CLINIC | Age: 73
End: 2023-10-19
Payer: MEDICARE

## 2023-10-19 ENCOUNTER — NURSE TRIAGE (OUTPATIENT)
Dept: ADMINISTRATIVE | Facility: CLINIC | Age: 73
End: 2023-10-19
Payer: MEDICARE

## 2023-10-19 DIAGNOSIS — U07.1 LAB TEST POSITIVE FOR DETECTION OF COVID-19 VIRUS: Primary | ICD-10-CM

## 2023-10-19 DIAGNOSIS — U07.1 LAB TEST POSITIVE FOR DETECTION OF COVID-19 VIRUS: ICD-10-CM

## 2023-10-19 NOTE — TELEPHONE ENCOUNTER
----- Message from Roman Padilla sent at 10/19/2023  4:43 PM CDT -----  Type: General Call Back         Name of Caller:pt  Would the patient rather a call back or a response via [facility]? Call  Best Call Back Number:[phone]  Additional Information: Pt states her pharmacy has not received her medication prescription for nirmatrelvir-ritonavir 300 mg (150 mg x 2)-100 mg copackaged tablets (EUA). Pt states she would like to speak to FELIPE Callejas or someone else in office in regards to this matter. Please reach out to patient for further information and assistance. Thank You.

## 2023-10-19 NOTE — TELEPHONE ENCOUNTER
Pt reports antiviral is not the pharmacy and needs to be resent. I have called [facility] to confirm and they do not have the prescription. Their fax number is [phone].    Reason for Disposition   Prescription not at pharmacy and was prescribed by PCP recently  (Exception: triager has access to EMR and prescription is recorded there. Go to Home [facility] and confirm for pharmacy.)    Protocols used: Medication Question Call-A-OH

## 2023-10-19 NOTE — TELEPHONE ENCOUNTER
Speaking with patient, she was DX with Covid via home test today. Has been feeling sick since Tuesday, did have Covid before so is familiar with S/S. She is interested in RX for antiviral. Triage done- dispo call back from provider in one hour. Verb understanding of care advice.   Reason for Disposition   HIGH RISK patient (e.g., weak immune system, age > 64 years, obesity with BMI of 30 or higher, pregnant, chronic lung disease or other chronic medical condition) and COVID symptoms (e.g., cough, fever)  (Exceptions: Already seen by doctor or NP/PA and no new or worsening symptoms.)    Additional Information   Negative: SEVERE difficulty breathing (e.g., struggling for each breath, speaks in single words)   Negative: Difficult to awaken or acting confused (e.g., disoriented, slurred speech)   Negative: Bluish (or gray) lips or face now   Negative: Shock suspected (e.g., cold/pale/clammy skin, too weak to stand, low BP, rapid pulse)   Negative: Sounds like a life-threatening emergency to the triager   Negative: SEVERE or constant chest pain or pressure  (Exception: Mild central chest pain, present only when coughing.)   Negative: MODERATE difficulty breathing (e.g., speaks in phrases, SOB even at rest, pulse 100-120)   Negative: Headache and stiff neck (can't touch chin to chest)   Negative: Oxygen level (e.g., pulse oximetry) 90% or lower   Negative: Chest pain or pressure  (Exception: MILD central chest pain, present only when coughing.)   Negative: Drinking very little and dehydration suspected (e.g., no urine > 12 hours, very dry mouth, very lightheaded)   Negative: Patient sounds very sick or weak to the triager   Negative: MILD difficulty breathing (e.g., minimal/no SOB at rest, SOB with walking, pulse <100)   Negative: Fever > 103 F (39.4 C)   Negative: Fever > 101 F (38.3 C) and over 60 years of age   Negative: Fever > 100.0 F (37.8 C) and bedridden (e.g., CVA, chronic illness, recovering from  surgery)    Protocols used: Coronavirus (COVID-19) Diagnosed or [id number]

## 2023-11-01 ENCOUNTER — PATIENT MESSAGE (OUTPATIENT)
Dept: FAMILY MEDICINE | Facility: CLINIC | Age: 73
End: 2023-11-01
Payer: MEDICARE

## 2023-11-22 ENCOUNTER — OFFICE VISIT (OUTPATIENT)
Dept: FAMILY MEDICINE | Facility: CLINIC | Age: 73
End: 2023-11-22
Payer: MEDICARE

## 2023-11-22 VITALS
HEART RATE: 61 BPM | WEIGHT: 188.81 LBS | DIASTOLIC BLOOD PRESSURE: 74 MMHG | OXYGEN SATURATION: 97 % | RESPIRATION RATE: 16 BRPM | BODY MASS INDEX: 27.96 KG/M2 | HEIGHT: 69 IN | SYSTOLIC BLOOD PRESSURE: 148 MMHG

## 2023-11-22 DIAGNOSIS — K21.9 GASTROESOPHAGEAL REFLUX DISEASE WITHOUT ESOPHAGITIS: ICD-10-CM

## 2023-11-22 DIAGNOSIS — M19.90 OSTEOARTHRITIS, UNSPECIFIED OSTEOARTHRITIS TYPE, UNSPECIFIED SITE: ICD-10-CM

## 2023-11-22 DIAGNOSIS — K57.30 DIVERTICULOSIS OF COLON: ICD-10-CM

## 2023-11-22 DIAGNOSIS — K21.9 GASTROESOPHAGEAL REFLUX DISEASE, UNSPECIFIED WHETHER ESOPHAGITIS PRESENT: ICD-10-CM

## 2023-11-22 DIAGNOSIS — F41.1 GENERALIZED ANXIETY DISORDER: ICD-10-CM

## 2023-11-22 DIAGNOSIS — I47.10 SVT (SUPRAVENTRICULAR TACHYCARDIA): ICD-10-CM

## 2023-11-22 DIAGNOSIS — L40.9 PSORIASIS: ICD-10-CM

## 2023-11-22 DIAGNOSIS — J31.0 CHRONIC RHINITIS: ICD-10-CM

## 2023-11-22 DIAGNOSIS — Z12.31 SCREENING MAMMOGRAM FOR BREAST CANCER: ICD-10-CM

## 2023-11-22 DIAGNOSIS — M54.16 LUMBAR RADICULOPATHY: Primary | ICD-10-CM

## 2023-11-22 DIAGNOSIS — K22.719 BARRETT'S ESOPHAGUS WITH DYSPLASIA: ICD-10-CM

## 2023-11-22 DIAGNOSIS — R42 VERTIGO: ICD-10-CM

## 2023-11-22 DIAGNOSIS — E78.00 HYPERCHOLESTEROLEMIA: ICD-10-CM

## 2023-11-22 DIAGNOSIS — F09 MILD COGNITIVE DISORDER: ICD-10-CM

## 2023-11-22 DIAGNOSIS — N39.0 RECURRENT UTI: ICD-10-CM

## 2023-11-22 DIAGNOSIS — K52.839 MICROSCOPIC COLITIS, UNSPECIFIED MICROSCOPIC COLITIS TYPE: ICD-10-CM

## 2023-11-22 DIAGNOSIS — K66.0 ABDOMINAL ADHESIONS: ICD-10-CM

## 2023-11-22 DIAGNOSIS — M79.604 LEG PAIN, BILATERAL: ICD-10-CM

## 2023-11-22 DIAGNOSIS — N95.2 VAGINAL ATROPHY: ICD-10-CM

## 2023-11-22 DIAGNOSIS — M79.605 LEG PAIN, BILATERAL: Primary | ICD-10-CM

## 2023-11-22 DIAGNOSIS — R73.03 PREDIABETES: ICD-10-CM

## 2023-11-22 DIAGNOSIS — M79.605 LEG PAIN, BILATERAL: ICD-10-CM

## 2023-11-22 DIAGNOSIS — K59.09 CHRONIC CONSTIPATION: ICD-10-CM

## 2023-11-22 DIAGNOSIS — L73.2 HYDRADENITIS: ICD-10-CM

## 2023-11-22 DIAGNOSIS — M79.604 LEG PAIN, BILATERAL: Primary | ICD-10-CM

## 2023-11-22 DIAGNOSIS — M85.89 OSTEOPENIA OF MULTIPLE SITES: ICD-10-CM

## 2023-11-22 DIAGNOSIS — F51.01 PRIMARY INSOMNIA: ICD-10-CM

## 2023-11-22 DIAGNOSIS — R25.2 LEG CRAMPS: ICD-10-CM

## 2023-11-22 DIAGNOSIS — K64.9 HEMORRHOIDS WITHOUT COMPLICATION: ICD-10-CM

## 2023-11-22 PROBLEM — K22.70 BARRETT'S ESOPHAGUS: Status: ACTIVE | Noted: 2023-11-22

## 2023-11-22 PROBLEM — N39.43 POST-VOID DRIBBLING: Status: RESOLVED | Noted: 2019-12-09 | Resolved: 2023-11-22

## 2023-11-22 PROBLEM — R35.1 NOCTURIA MORE THAN TWICE PER NIGHT: Status: RESOLVED | Noted: 2019-04-25 | Resolved: 2023-11-22

## 2023-11-22 PROBLEM — R35.0 INCREASED FREQUENCY OF URINATION: Status: RESOLVED | Noted: 2019-04-25 | Resolved: 2023-11-22

## 2023-11-22 PROBLEM — R15.0 INCOMPLETE DEFECATION: Status: RESOLVED | Noted: 2019-04-25 | Resolved: 2023-11-22

## 2023-11-22 PROCEDURE — 1101F PT FALLS ASSESS-DOCD LE1/YR: CPT | Mod: CPTII,S$GLB,, | Performed by: STUDENT IN AN ORGANIZED HEALTH CARE EDUCATION/TRAINING PROGRAM

## 2023-11-22 PROCEDURE — 99999 PR PBB SHADOW E&M-EST. PATIENT-LVL V: ICD-10-PCS | Mod: PBBFAC,,, | Performed by: STUDENT IN AN ORGANIZED HEALTH CARE EDUCATION/TRAINING PROGRAM

## 2023-11-22 PROCEDURE — 1157F PR ADVANCE CARE PLAN OR EQUIV PRESENT IN MEDICAL RECORD: ICD-10-PCS | Mod: CPTII,S$GLB,, | Performed by: STUDENT IN AN ORGANIZED HEALTH CARE EDUCATION/TRAINING PROGRAM

## 2023-11-22 PROCEDURE — 3077F SYST BP >= 140 MM HG: CPT | Mod: CPTII,S$GLB,, | Performed by: STUDENT IN AN ORGANIZED HEALTH CARE EDUCATION/TRAINING PROGRAM

## 2023-11-22 PROCEDURE — 3008F PR BODY MASS INDEX (BMI) DOCUMENTED: ICD-10-PCS | Mod: CPTII,S$GLB,, | Performed by: STUDENT IN AN ORGANIZED HEALTH CARE EDUCATION/TRAINING PROGRAM

## 2023-11-22 PROCEDURE — 3044F PR MOST RECENT HEMOGLOBIN A1C LEVEL <7.0%: ICD-10-PCS | Mod: CPTII,S$GLB,, | Performed by: STUDENT IN AN ORGANIZED HEALTH CARE EDUCATION/TRAINING PROGRAM

## 2023-11-22 PROCEDURE — 1101F PR PT FALLS ASSESS DOC 0-1 FALLS W/OUT INJ PAST YR: ICD-10-PCS | Mod: CPTII,S$GLB,, | Performed by: STUDENT IN AN ORGANIZED HEALTH CARE EDUCATION/TRAINING PROGRAM

## 2023-11-22 PROCEDURE — 3288F PR FALLS RISK ASSESSMENT DOCUMENTED: ICD-10-PCS | Mod: CPTII,S$GLB,, | Performed by: STUDENT IN AN ORGANIZED HEALTH CARE EDUCATION/TRAINING PROGRAM

## 2023-11-22 PROCEDURE — 3044F HG A1C LEVEL LT 7.0%: CPT | Mod: CPTII,S$GLB,, | Performed by: STUDENT IN AN ORGANIZED HEALTH CARE EDUCATION/TRAINING PROGRAM

## 2023-11-22 PROCEDURE — 1126F PR PAIN SEVERITY QUANTIFIED, NO PAIN PRESENT: ICD-10-PCS | Mod: CPTII,S$GLB,, | Performed by: STUDENT IN AN ORGANIZED HEALTH CARE EDUCATION/TRAINING PROGRAM

## 2023-11-22 PROCEDURE — 3077F PR MOST RECENT SYSTOLIC BLOOD PRESSURE >= 140 MM HG: ICD-10-PCS | Mod: CPTII,S$GLB,, | Performed by: STUDENT IN AN ORGANIZED HEALTH CARE EDUCATION/TRAINING PROGRAM

## 2023-11-22 PROCEDURE — 99214 PR OFFICE/OUTPT VISIT, EST, LEVL IV, 30-39 MIN: ICD-10-PCS | Mod: S$GLB,,, | Performed by: STUDENT IN AN ORGANIZED HEALTH CARE EDUCATION/TRAINING PROGRAM

## 2023-11-22 PROCEDURE — 3078F DIAST BP <80 MM HG: CPT | Mod: CPTII,S$GLB,, | Performed by: STUDENT IN AN ORGANIZED HEALTH CARE EDUCATION/TRAINING PROGRAM

## 2023-11-22 PROCEDURE — 3078F PR MOST RECENT DIASTOLIC BLOOD PRESSURE < 80 MM HG: ICD-10-PCS | Mod: CPTII,S$GLB,, | Performed by: STUDENT IN AN ORGANIZED HEALTH CARE EDUCATION/TRAINING PROGRAM

## 2023-11-22 PROCEDURE — 3288F FALL RISK ASSESSMENT DOCD: CPT | Mod: CPTII,S$GLB,, | Performed by: STUDENT IN AN ORGANIZED HEALTH CARE EDUCATION/TRAINING PROGRAM

## 2023-11-22 PROCEDURE — 1159F MED LIST DOCD IN RCRD: CPT | Mod: CPTII,S$GLB,, | Performed by: STUDENT IN AN ORGANIZED HEALTH CARE EDUCATION/TRAINING PROGRAM

## 2023-11-22 PROCEDURE — 1157F ADVNC CARE PLAN IN RCRD: CPT | Mod: CPTII,S$GLB,, | Performed by: STUDENT IN AN ORGANIZED HEALTH CARE EDUCATION/TRAINING PROGRAM

## 2023-11-22 PROCEDURE — 1160F PR REVIEW ALL MEDS BY PRESCRIBER/CLIN PHARMACIST DOCUMENTED: ICD-10-PCS | Mod: CPTII,S$GLB,, | Performed by: STUDENT IN AN ORGANIZED HEALTH CARE EDUCATION/TRAINING PROGRAM

## 2023-11-22 PROCEDURE — 1160F RVW MEDS BY RX/DR IN RCRD: CPT | Mod: CPTII,S$GLB,, | Performed by: STUDENT IN AN ORGANIZED HEALTH CARE EDUCATION/TRAINING PROGRAM

## 2023-11-22 PROCEDURE — 1159F PR MEDICATION LIST DOCUMENTED IN MEDICAL RECORD: ICD-10-PCS | Mod: CPTII,S$GLB,, | Performed by: STUDENT IN AN ORGANIZED HEALTH CARE EDUCATION/TRAINING PROGRAM

## 2023-11-22 PROCEDURE — 99214 OFFICE O/P EST MOD 30 MIN: CPT | Mod: S$GLB,,, | Performed by: STUDENT IN AN ORGANIZED HEALTH CARE EDUCATION/TRAINING PROGRAM

## 2023-11-22 PROCEDURE — 1126F AMNT PAIN NOTED NONE PRSNT: CPT | Mod: CPTII,S$GLB,, | Performed by: STUDENT IN AN ORGANIZED HEALTH CARE EDUCATION/TRAINING PROGRAM

## 2023-11-22 PROCEDURE — 3008F BODY MASS INDEX DOCD: CPT | Mod: CPTII,S$GLB,, | Performed by: STUDENT IN AN ORGANIZED HEALTH CARE EDUCATION/TRAINING PROGRAM

## 2023-11-22 PROCEDURE — 99999 PR PBB SHADOW E&M-EST. PATIENT-LVL V: CPT | Mod: PBBFAC,,, | Performed by: STUDENT IN AN ORGANIZED HEALTH CARE EDUCATION/TRAINING PROGRAM

## 2023-11-22 RX ORDER — MONTELUKAST SODIUM 10 MG/1
10 TABLET ORAL NIGHTLY
COMMUNITY

## 2023-11-22 RX ORDER — MECLIZINE HYDROCHLORIDE 25 MG/1
25 TABLET ORAL EVERY 8 HOURS PRN
Qty: 30 TABLET | Refills: 1 | Status: SHIPPED | OUTPATIENT
Start: 2023-11-22 | End: 2023-11-22 | Stop reason: SDUPTHER

## 2023-11-22 RX ORDER — MECLIZINE HYDROCHLORIDE 25 MG/1
25 TABLET ORAL EVERY 8 HOURS PRN
Qty: 30 TABLET | Refills: 1 | Status: SHIPPED | OUTPATIENT
Start: 2023-11-22

## 2023-11-22 RX ORDER — OMEPRAZOLE 40 MG/1
40 CAPSULE, DELAYED RELEASE ORAL EVERY MORNING
Start: 2023-11-22 | End: 2024-04-02 | Stop reason: SDUPTHER

## 2023-11-22 NOTE — PROGRESS NOTES
Subjective:       Patient ID: Sosa Viramontes is a 72 y.o. female.    Chief Complaint: Establish Care    Patient Active Problem List:     Chronic constipation     Vaginal atrophy     S/P RA-diagnostic laparoscopy, A&P repair, MUS, and cystoscopy     SVT (supraventricular tachycardia)     Williamson's esophagus     Diverticulosis of colon     Gastroesophageal reflux disease     Generalized anxiety disorder     Hemorrhoids without complication     Hypercholesterolemia     Lumbar radiculopathy     Microscopic colitis     Mild cognitive disorder     Osteoarthritis     Primary insomnia     Psoriasis          Review of Systems   Constitutional:  Negative for activity change and appetite change.   Respiratory:  Negative for shortness of breath.    Cardiovascular:  Negative for chest pain.   Gastrointestinal:  Negative for abdominal pain.   Genitourinary:  Negative for dysuria.   Integumentary:  Negative for rash.   Psychiatric/Behavioral:  Negative for dysphoric mood and sleep disturbance. The patient is not nervous/anxious.          Objective:      Physical Exam  Constitutional:       General: She is not in acute distress.     Appearance: Normal appearance. She is not ill-appearing.   Eyes:      Conjunctiva/sclera: Conjunctivae normal.   Cardiovascular:      Rate and Rhythm: Normal rate and regular rhythm.      Heart sounds: Normal heart sounds. No murmur heard.  Pulmonary:      Effort: Pulmonary effort is normal. No respiratory distress.      Breath sounds: Normal breath sounds.   Musculoskeletal:      Right lower leg: Edema (1+) present.      Left lower leg: Edema (1+) present.   Skin:     General: Skin is warm and dry.   Neurological:      Mental Status: She is alert. Mental status is at baseline.      Gait: Gait normal.   Psychiatric:         Mood and Affect: Mood normal.         Behavior: Behavior normal.         Thought Content: Thought content normal.         Judgment: Judgment normal.         Assessment:       1.  Lumbar radiculopathy    2. Mild cognitive disorder    3. Generalized anxiety disorder    4. Psoriasis    5. SVT (supraventricular tachycardia)    6. Hypercholesterolemia    7. Vaginal atrophy    8. Chronic constipation    9. Williamson's esophagus with dysplasia    10. Diverticulosis of colon    11. Gastroesophageal reflux disease without esophagitis    12. Hemorrhoids without complication    13. Microscopic colitis, unspecified microscopic colitis type    14. Osteoarthritis, unspecified osteoarthritis type, unspecified site    15. Primary insomnia    16. Leg pain, bilateral    17. Gastroesophageal reflux disease, unspecified whether esophagitis present    18. Chronic rhinitis    19. Recurrent UTI    20. Vertigo    21. Leg cramps    22. Abdominal adhesions    23. Hydradenitis    24. Prediabetes    25. Osteopenia of multiple sites    26. Screening mammogram for breast cancer        Plan:       Problem List Items Addressed This Visit          Neuro    Lumbar radiculopathy - Primary     Stable. Continue current medications and regular followup.           Mild cognitive disorder     Stable and doing well            Psychiatric    Generalized anxiety disorder     Stable not on therapy            ENT    Chronic rhinitis     Stable on singulair and allegra         Vertigo     Uses prn meclizine         Relevant Medications    meclizine (ANTIVERT) 25 mg tablet       Derm    Psoriasis     Stable and doing well         Relevant Orders    Ambulatory referral/consult to Dermatology    Hydradenitis    Relevant Orders    Ambulatory referral/consult to Dermatology       Cardiac/Vascular    SVT (supraventricular tachycardia)     S/p ablation. Sees cardiology         Hypercholesterolemia     The 10-year ASCVD risk score (Sheree LOPEZ, et al., 2019) is: 17.2%    Values used to calculate the score:      Age: 72 years      Sex: Female      Is Non- : No      Diabetic: No      Tobacco smoker: No      Systolic Blood  Pressure: 148 mmHg      Is BP treated: No      HDL Cholesterol: 30 mg/dL      Total Cholesterol: 176 mg/dL    Lab Results   Component Value Date    CHOL 176 02/17/2022    CHOL 197 02/12/2021     Lab Results   Component Value Date    HDL 30 (L) 02/17/2022    HDL 38 (L) 02/12/2021     Lab Results   Component Value Date    LDLCALC 110.0 02/17/2022    LDLCALC 137.0 02/12/2021     Lab Results   Component Value Date    TRIG 180 (H) 02/17/2022    TRIG 110 02/12/2021     Lab Results   Component Value Date    CHOLHDL 17.0 (L) 02/17/2022    CHOLHDL 19.3 (L) 02/12/2021   Not on cholesterol medication            Renal/    Vaginal atrophy     Sees urology and is on estrace for this as well         Recurrent UTI     Uses prn uribel  The estrogen cream is helping  Sees urology            Endocrine    Prediabetes     Lab Results   Component Value Date    HGBA1C 5.7 (H) 05/24/2023   Diet changes and follow         Relevant Orders    Hemoglobin A1C       GI    Chronic constipation     Uses the magnesium to help her stool         Relevant Orders    Ambulatory referral/consult to Gastroenterology    Williamson's esophagus     Follows with GI for this and on bid ppi         Relevant Orders    Ambulatory referral/consult to Gastroenterology    Diverticulosis of colon    Gastroesophageal reflux disease     Stable on bid ppi         Relevant Medications    omeprazole (PRILOSEC) 40 MG capsule    Hemorrhoids without complication    Microscopic colitis     Stable and seeing GI         Relevant Orders    Ambulatory referral/consult to Gastroenterology    Abdominal adhesions     S/p surgical intervention and doing well            Orthopedic    Osteoarthritis     Stable. Continue current medications and regular followup.           Osteopenia of multiple sites     Dexa in 2021  There is a 12.9% risk of a major osteoporotic fracture and a 2.1% risk of hip fracture in the next 10 years (FRAX).     Impression:  Osteopenia of the lumbar spine, normal  bone mineral density of the left hip.    On calcium/vitamin D         Relevant Orders    DXA Bone Density Axial Skeleton 1 or more sites       Other    Primary insomnia     Stable on trazodone         Leg cramps     Uses magnesium          Other Visit Diagnoses       Leg pain, bilateral        Relevant Orders    US Lower Extremity Veins Right    US Lower Extremity Veins Left    Screening mammogram for breast cancer        Relevant Orders    Mammo Digital Screening Bilat w/ [facility]

## 2023-11-22 NOTE — ASSESSMENT & PLAN NOTE
The 10-year ASCVD risk score (Sheree LOPEZ, et al., 2019) is: 17.2%    Values used to calculate the score:      Age: 72 years      Sex: Female      Is Non- : No      Diabetic: No      Tobacco smoker: No      Systolic Blood Pressure: 148 mmHg      Is BP treated: No      HDL Cholesterol: 30 mg/dL      Total Cholesterol: 176 mg/dL    Lab Results   Component Value Date    CHOL 176 02/17/2022    CHOL 197 02/12/2021     Lab Results   Component Value Date    HDL 30 (L) 02/17/2022    HDL 38 (L) 02/12/2021     Lab Results   Component Value Date    LDLCALC 110.0 02/17/2022    LDLCALC 137.0 02/12/2021     Lab Results   Component Value Date    TRIG 180 (H) 02/17/2022    TRIG 110 02/12/2021       Lab Results   Component Value Date    CHOLHDL 17.0 (L) 02/17/2022    CHOLHDL 19.3 (L) 02/12/2021     Not on cholesterol medication

## 2023-11-22 NOTE — ASSESSMENT & PLAN NOTE
Dexa in 2021  There is a 12.9% risk of a major osteoporotic fracture and a 2.1% risk of hip fracture in the next 10 years (FRAX).     Impression:  Osteopenia of the lumbar spine, normal bone mineral density of the left hip.    On calcium/vitamin D

## 2023-11-27 ENCOUNTER — HOSPITAL ENCOUNTER (OUTPATIENT)
Dept: RADIOLOGY | Facility: HOSPITAL | Age: 73
Discharge: HOME | End: 2023-11-27
Attending: STUDENT IN AN ORGANIZED HEALTH CARE EDUCATION/TRAINING PROGRAM
Payer: MEDICARE

## 2023-11-27 DIAGNOSIS — M79.604 LEG PAIN, BILATERAL: ICD-10-CM

## 2023-11-27 DIAGNOSIS — M79.605 LEG PAIN, BILATERAL: ICD-10-CM

## 2023-11-27 DIAGNOSIS — M85.89 OSTEOPENIA OF MULTIPLE SITES: ICD-10-CM

## 2023-11-27 PROCEDURE — 77080 DXA BONE DENSITY AXIAL: CPT | Mod: TC

## 2023-11-27 PROCEDURE — 93970 EXTREMITY STUDY: CPT | Mod: 26,,, | Performed by: RADIOLOGY

## 2023-11-27 PROCEDURE — 93970 EXTREMITY STUDY: CPT | Mod: TC

## 2023-11-27 PROCEDURE — 77080 DXA BONE DENSITY AXIAL: CPT | Mod: 26,,, | Performed by: RADIOLOGY

## 2023-11-27 PROCEDURE — 77080 DXA BONE DENSITY AXIAL SKELETON 1 OR MORE SITES: ICD-10-PCS | Mod: 26,,, | Performed by: RADIOLOGY

## 2023-11-27 PROCEDURE — 93970 US LOWER EXTREMITY VEINS BILATERAL: ICD-10-PCS | Mod: 26,,, | Performed by: RADIOLOGY

## 2024-02-26 PROBLEM — N39.0 RECURRENT UTI: Status: RESOLVED | Noted: 2023-11-22 | Resolved: 2024-02-26

## 2024-04-02 ENCOUNTER — OFFICE VISIT (OUTPATIENT)
Dept: FAMILY MEDICINE | Facility: CLINIC | Age: 74
End: 2024-04-02
Payer: MEDICARE

## 2024-04-02 ENCOUNTER — LAB VISIT (OUTPATIENT)
Dept: LAB | Facility: HOSPITAL | Age: 74
End: 2024-04-02
Attending: STUDENT IN AN ORGANIZED HEALTH CARE EDUCATION/TRAINING PROGRAM
Payer: MEDICARE

## 2024-04-02 VITALS
OXYGEN SATURATION: 98 % | WEIGHT: 191.69 LBS | RESPIRATION RATE: 16 BRPM | SYSTOLIC BLOOD PRESSURE: 134 MMHG | DIASTOLIC BLOOD PRESSURE: 78 MMHG | HEIGHT: 69 IN | HEART RATE: 99 BPM | BODY MASS INDEX: 28.39 KG/M2

## 2024-04-02 DIAGNOSIS — L40.9 PSORIASIS: ICD-10-CM

## 2024-04-02 DIAGNOSIS — N95.2 VAGINAL ATROPHY: ICD-10-CM

## 2024-04-02 DIAGNOSIS — K52.839 MICROSCOPIC COLITIS, UNSPECIFIED MICROSCOPIC COLITIS TYPE: ICD-10-CM

## 2024-04-02 DIAGNOSIS — K22.719 BARRETT'S ESOPHAGUS WITH DYSPLASIA: ICD-10-CM

## 2024-04-02 DIAGNOSIS — K21.9 GASTROESOPHAGEAL REFLUX DISEASE, UNSPECIFIED WHETHER ESOPHAGITIS PRESENT: ICD-10-CM

## 2024-04-02 DIAGNOSIS — F41.1 GENERALIZED ANXIETY DISORDER: ICD-10-CM

## 2024-04-02 DIAGNOSIS — E78.2 MODERATE MIXED HYPERLIPIDEMIA NOT REQUIRING STATIN THERAPY: ICD-10-CM

## 2024-04-02 DIAGNOSIS — R73.03 PREDIABETES: ICD-10-CM

## 2024-04-02 DIAGNOSIS — Z86.79 HISTORY OF HYPOTENSION: ICD-10-CM

## 2024-04-02 DIAGNOSIS — I47.10 SVT (SUPRAVENTRICULAR TACHYCARDIA): ICD-10-CM

## 2024-04-02 DIAGNOSIS — N39.0 RECURRENT UTI: Primary | ICD-10-CM

## 2024-04-02 LAB
ALBUMIN SERPL BCP-MCNC: 3.9 G/DL (ref 3.5–5.2)
ALP SERPL-CCNC: 66 U/L (ref 55–135)
ALT SERPL W/O P-5'-P-CCNC: 22 U/L (ref 10–44)
ANION GAP SERPL CALC-SCNC: 8 MMOL/L (ref 8–16)
AST SERPL-CCNC: 15 U/L (ref 10–40)
BASOPHILS # BLD AUTO: 0.07 K/UL (ref 0–0.2)
BASOPHILS NFR BLD: 1.1 % (ref 0–1.9)
BILIRUB SERPL-MCNC: 0.5 MG/DL (ref 0.1–1)
BUN SERPL-MCNC: 11 MG/DL (ref 8–23)
CALCIUM SERPL-MCNC: 9.4 MG/DL (ref 8.7–10.5)
CHLORIDE SERPL-SCNC: 109 MMOL/L (ref 95–110)
CHOLEST SERPL-MCNC: 205 MG/DL (ref 120–199)
CHOLEST/HDLC SERPL: 5.7 {RATIO} (ref 2–5)
CO2 SERPL-SCNC: 25 MMOL/L (ref 23–29)
CREAT SERPL-MCNC: 0.9 MG/DL (ref 0.5–1.4)
DIFFERENTIAL METHOD BLD: NORMAL
EOSINOPHIL # BLD AUTO: 0.2 K/UL (ref 0–0.5)
EOSINOPHIL NFR BLD: 3.3 % (ref 0–8)
ERYTHROCYTE [DISTWIDTH] IN BLOOD BY AUTOMATED COUNT: 13.2 % (ref 11.5–14.5)
EST. GFR  (NO RACE VARIABLE): >60 ML/MIN/1.73 M^2
ESTIMATED AVG GLUCOSE: 111 MG/DL (ref 68–131)
GLUCOSE SERPL-MCNC: 97 MG/DL (ref 70–110)
HBA1C MFR BLD: 5.5 % (ref 4–5.6)
HCT VFR BLD AUTO: 44.2 % (ref 37–48.5)
HDLC SERPL-MCNC: 36 MG/DL (ref 40–75)
HDLC SERPL: 17.6 % (ref 20–50)
HGB BLD-MCNC: 14.6 G/DL (ref 12–16)
IMM GRANULOCYTES # BLD AUTO: 0.01 K/UL (ref 0–0.04)
IMM GRANULOCYTES NFR BLD AUTO: 0.2 % (ref 0–0.5)
LDLC SERPL CALC-MCNC: 126 MG/DL (ref 63–159)
LYMPHOCYTES # BLD AUTO: 1.6 K/UL (ref 1–4.8)
LYMPHOCYTES NFR BLD: 25.7 % (ref 18–48)
MCH RBC QN AUTO: 30 PG (ref 27–31)
MCHC RBC AUTO-ENTMCNC: 33 G/DL (ref 32–36)
MCV RBC AUTO: 91 FL (ref 82–98)
MONOCYTES # BLD AUTO: 0.6 K/UL (ref 0.3–1)
MONOCYTES NFR BLD: 9.1 % (ref 4–15)
NEUTROPHILS # BLD AUTO: 3.8 K/UL (ref 1.8–7.7)
NEUTROPHILS NFR BLD: 60.6 % (ref 38–73)
NONHDLC SERPL-MCNC: 169 MG/DL
NRBC BLD-RTO: 0 /100 WBC
PLATELET # BLD AUTO: 202 K/UL (ref 150–450)
PMV BLD AUTO: 12.1 FL (ref 9.2–12.9)
POTASSIUM SERPL-SCNC: 4.5 MMOL/L (ref 3.5–5.1)
PROT SERPL-MCNC: 6.7 G/DL (ref 6–8.4)
RBC # BLD AUTO: 4.86 M/UL (ref 4–5.4)
SODIUM SERPL-SCNC: 142 MMOL/L (ref 136–145)
TRIGL SERPL-MCNC: 215 MG/DL (ref 30–150)
TSH SERPL DL<=0.005 MIU/L-ACNC: 0.84 UIU/ML (ref 0.4–4)
WBC # BLD AUTO: 6.27 K/UL (ref 3.9–12.7)

## 2024-04-02 PROCEDURE — 36415 COLL VENOUS BLD VENIPUNCTURE: CPT | Performed by: STUDENT IN AN ORGANIZED HEALTH CARE EDUCATION/TRAINING PROGRAM

## 2024-04-02 PROCEDURE — 3075F SYST BP GE 130 - 139MM HG: CPT | Mod: CPTII,S$GLB,, | Performed by: STUDENT IN AN ORGANIZED HEALTH CARE EDUCATION/TRAINING PROGRAM

## 2024-04-02 PROCEDURE — 80061 LIPID PANEL: CPT | Performed by: STUDENT IN AN ORGANIZED HEALTH CARE EDUCATION/TRAINING PROGRAM

## 2024-04-02 PROCEDURE — 1159F MED LIST DOCD IN RCRD: CPT | Mod: CPTII,S$GLB,, | Performed by: STUDENT IN AN ORGANIZED HEALTH CARE EDUCATION/TRAINING PROGRAM

## 2024-04-02 PROCEDURE — 1160F RVW MEDS BY RX/DR IN RCRD: CPT | Mod: CPTII,S$GLB,, | Performed by: STUDENT IN AN ORGANIZED HEALTH CARE EDUCATION/TRAINING PROGRAM

## 2024-04-02 PROCEDURE — 80053 COMPREHEN METABOLIC PANEL: CPT | Performed by: STUDENT IN AN ORGANIZED HEALTH CARE EDUCATION/TRAINING PROGRAM

## 2024-04-02 PROCEDURE — 99999 PR PBB SHADOW E&M-EST. PATIENT-LVL V: CPT | Mod: PBBFAC,,, | Performed by: STUDENT IN AN ORGANIZED HEALTH CARE EDUCATION/TRAINING PROGRAM

## 2024-04-02 PROCEDURE — 84443 ASSAY THYROID STIM HORMONE: CPT | Performed by: STUDENT IN AN ORGANIZED HEALTH CARE EDUCATION/TRAINING PROGRAM

## 2024-04-02 PROCEDURE — 1101F PT FALLS ASSESS-DOCD LE1/YR: CPT | Mod: CPTII,S$GLB,, | Performed by: STUDENT IN AN ORGANIZED HEALTH CARE EDUCATION/TRAINING PROGRAM

## 2024-04-02 PROCEDURE — 1157F ADVNC CARE PLAN IN RCRD: CPT | Mod: CPTII,S$GLB,, | Performed by: STUDENT IN AN ORGANIZED HEALTH CARE EDUCATION/TRAINING PROGRAM

## 2024-04-02 PROCEDURE — 3288F FALL RISK ASSESSMENT DOCD: CPT | Mod: CPTII,S$GLB,, | Performed by: STUDENT IN AN ORGANIZED HEALTH CARE EDUCATION/TRAINING PROGRAM

## 2024-04-02 PROCEDURE — 3078F DIAST BP <80 MM HG: CPT | Mod: CPTII,S$GLB,, | Performed by: STUDENT IN AN ORGANIZED HEALTH CARE EDUCATION/TRAINING PROGRAM

## 2024-04-02 PROCEDURE — 3008F BODY MASS INDEX DOCD: CPT | Mod: CPTII,S$GLB,, | Performed by: STUDENT IN AN ORGANIZED HEALTH CARE EDUCATION/TRAINING PROGRAM

## 2024-04-02 PROCEDURE — 83036 HEMOGLOBIN GLYCOSYLATED A1C: CPT | Performed by: STUDENT IN AN ORGANIZED HEALTH CARE EDUCATION/TRAINING PROGRAM

## 2024-04-02 PROCEDURE — 99214 OFFICE O/P EST MOD 30 MIN: CPT | Mod: S$GLB,,, | Performed by: STUDENT IN AN ORGANIZED HEALTH CARE EDUCATION/TRAINING PROGRAM

## 2024-04-02 PROCEDURE — 85025 COMPLETE CBC W/AUTO DIFF WBC: CPT | Performed by: STUDENT IN AN ORGANIZED HEALTH CARE EDUCATION/TRAINING PROGRAM

## 2024-04-02 PROCEDURE — 1126F AMNT PAIN NOTED NONE PRSNT: CPT | Mod: CPTII,S$GLB,, | Performed by: STUDENT IN AN ORGANIZED HEALTH CARE EDUCATION/TRAINING PROGRAM

## 2024-04-02 RX ORDER — MULTIVITAMIN
1 TABLET ORAL ONCE
COMMUNITY

## 2024-04-02 RX ORDER — OMEPRAZOLE 40 MG/1
40 CAPSULE, DELAYED RELEASE ORAL EVERY MORNING
Qty: 90 CAPSULE | Refills: 3 | Status: SHIPPED | OUTPATIENT
Start: 2024-04-02 | End: 2024-06-17 | Stop reason: SDUPTHER

## 2024-04-02 NOTE — ASSESSMENT & PLAN NOTE
Lab Results   Component Value Date    HGBA1C 5.7 (H) 05/24/2023     Stable. Continue current medications and regular followup.

## 2024-04-02 NOTE — ASSESSMENT & PLAN NOTE
Has been seeing utis in the past.  Was seeing obgyn at some point.  Can do Evisits if needed.  Has seen urology before.

## 2024-04-02 NOTE — PROGRESS NOTES
Subjective:       Patient ID: Sosa Viramontes is a 73 y.o. female.    Chief Complaint: Hypertension and Rash (Needs a referral to derm for psoriasis)    Patient Active Problem List:     Chronic constipation     Vaginal atrophy     S/P RA-diagnostic laparoscopy, A&P repair, MUS, and cystoscopy     SVT (supraventricular tachycardia)     Williamson's esophagus     Diverticulosis of colon     Gastroesophageal reflux disease     Generalized anxiety disorder     Hemorrhoids without complication     Moderate mixed hyperlipidemia not requiring statin therapy     Lumbar radiculopathy     Microscopic colitis     Mild cognitive disorder     Osteoarthritis     Primary insomnia     Psoriasis     Chronic rhinitis     Recurrent UTI     Vertigo     Leg cramps     Abdominal adhesions     Hydradenitis     Prediabetes     Osteopenia of multiple sites     History of hypotension    Current Outpatient Medications:  calcium-vitamin D (CALCIUM 600 + D,3,) 600 mg-10 mcg (400 unit) Tab, Take 1 tablet by mouth once.  estradioL (ESTRACE) 0.01 % (0.1 mg/gram) vaginal cream, Use 1/2  gram per vagina nightly x 2 weeks then 3 times per week  famotidine (PEPCID) 40 MG tablet, Take 1 tablet (40 mg total) by mouth once daily.  fexofenadine (ALLEGRA) 180 MG tablet, Take 1 tablet (180 mg total) by mouth once daily.  magnesium oxide (MAG-OX) 400 mg (241.3 mg magnesium) tablet, Take 400 mg by mouth once daily.  meclizine (ANTIVERT) 25 mg tablet, Take 1 tablet (25 mg total) by mouth every 8 (eight) hours as needed for Dizziness.  montelukast (SINGULAIR) 10 mg tablet, Take 10 mg by mouth every evening.  omeprazole (PRILOSEC) 40 MG capsule, Take 1 capsule (40 mg total) by mouth every morning. TK 1 C PO D  traZODone (DESYREL) 100 MG tablet, TAKE 1 TABLET BY MOUTH AT BEDTIME    No current facility-administered medications for this visit.          Review of Systems   Constitutional:  Negative for activity change and appetite change.   Respiratory:  Negative  for shortness of breath.    Cardiovascular:  Negative for chest pain.   Gastrointestinal:  Negative for abdominal pain.   Genitourinary:  Negative for dysuria.   Integumentary:  Negative for rash.   Psychiatric/Behavioral:  Negative for dysphoric mood and sleep disturbance. The patient is not nervous/anxious.          Objective:      Physical Exam  Constitutional:       General: She is not in acute distress.     Appearance: Normal appearance. She is not ill-appearing.   Eyes:      Conjunctiva/sclera: Conjunctivae normal.   Cardiovascular:      Rate and Rhythm: Normal rate and regular rhythm.      Heart sounds: Normal heart sounds. No murmur heard.  Pulmonary:      Effort: Pulmonary effort is normal. No respiratory distress.      Breath sounds: Normal breath sounds.   Musculoskeletal:      Right lower leg: No edema.      Left lower leg: No edema.   Skin:     General: Skin is warm and dry.   Neurological:      Mental Status: She is alert. Mental status is at baseline.      Gait: Gait normal.   Psychiatric:         Mood and Affect: Mood normal.         Behavior: Behavior normal.         Thought Content: Thought content normal.         Judgment: Judgment normal.         Assessment:       1. Recurrent UTI    2. Gastroesophageal reflux disease, unspecified whether esophagitis present    3. Moderate mixed hyperlipidemia not requiring statin therapy    4. Vaginal atrophy    5. Psoriasis    6. Generalized anxiety disorder    7. Williamson's esophagus with dysplasia    8. Microscopic colitis, unspecified microscopic colitis type    9. SVT (supraventricular tachycardia)    10. History of hypotension    11. Prediabetes        Plan:       Problem List Items Addressed This Visit          Psychiatric    Generalized anxiety disorder     Stable. Continue current medications and regular followup.              Derm    Psoriasis     Would like to see dermatology         Relevant Orders    Ambulatory referral/consult to Dermatology        Cardiac/Vascular    SVT (supraventricular tachycardia)     Stable. Continue current medications and regular followup.           Moderate mixed hyperlipidemia not requiring statin therapy     Stable. Continue current diet and regular followup.             Relevant Orders    Lipid Panel    History of hypotension     Had a tilt table eval in the past  This has resolved. Has some intermittent elevations at urgent care. Monitor at home for any need for bp medication with keeping in mind her past history of bp drops            Renal/    Vaginal atrophy     Saw urogyn and is on vaginal estrogen         Recurrent UTI - Primary     Has been seeing utis in the past.  Was seeing obgyn at some point.  Can do Evisits if needed.  Has seen urology before.             Endocrine    Prediabetes     Lab Results   Component Value Date    HGBA1C 5.7 (H) 05/24/2023   Stable. Continue current medications and regular followup.           Relevant Orders    CBC Auto Differential    Comprehensive Metabolic Panel    Microalbumin/Creatinine Ratio, Urine    Hemoglobin A1C    TSH       GI    Williamson's esophagus     Would like to follow with GI         Relevant Orders    Ambulatory referral/consult to Gastroenterology    Gastroesophageal reflux disease    Relevant Medications    omeprazole (PRILOSEC) 40 MG capsule    Microscopic colitis     Needs new followup with GI         Relevant Orders    Ambulatory referral/consult to Gastroenterology

## 2024-04-02 NOTE — ASSESSMENT & PLAN NOTE
Had a tilt table eval in the past  This has resolved. Has some intermittent elevations at urgent care. Monitor at home for any need for bp medication with keeping in mind her past history of bp drops

## 2024-05-16 ENCOUNTER — OFFICE VISIT (OUTPATIENT)
Dept: FAMILY MEDICINE | Facility: CLINIC | Age: 74
End: 2024-05-16
Payer: MEDICARE

## 2024-05-16 VITALS
RESPIRATION RATE: 12 BRPM | HEART RATE: 68 BPM | TEMPERATURE: 98 F | SYSTOLIC BLOOD PRESSURE: 128 MMHG | HEIGHT: 69 IN | BODY MASS INDEX: 28.98 KG/M2 | OXYGEN SATURATION: 97 % | DIASTOLIC BLOOD PRESSURE: 82 MMHG | WEIGHT: 195.69 LBS

## 2024-05-16 DIAGNOSIS — Z87.440 HISTORY OF RECURRENT UTI (URINARY TRACT INFECTION): ICD-10-CM

## 2024-05-16 DIAGNOSIS — R39.9 UTI SYMPTOMS: Primary | ICD-10-CM

## 2024-05-16 LAB
BILIRUBIN, UA POC OHS: NEGATIVE
BLOOD, UA POC OHS: ABNORMAL
CLARITY, UA POC OHS: CLEAR
COLOR, UA POC OHS: YELLOW
GLUCOSE, UA POC OHS: NEGATIVE
KETONES, UA POC OHS: NEGATIVE
LEUKOCYTES, UA POC OHS: ABNORMAL
NITRITE, UA POC OHS: POSITIVE
PH, UA POC OHS: 6
PROTEIN, UA POC OHS: 30
SPECIFIC GRAVITY, UA POC OHS: 1.01
UROBILINOGEN, UA POC OHS: 0.2

## 2024-05-16 PROCEDURE — 99999 PR PBB SHADOW E&M-EST. PATIENT-LVL IV: CPT | Mod: PBBFAC,,, | Performed by: NURSE PRACTITIONER

## 2024-05-16 PROCEDURE — 3044F HG A1C LEVEL LT 7.0%: CPT | Mod: CPTII,S$GLB,, | Performed by: NURSE PRACTITIONER

## 2024-05-16 PROCEDURE — 1125F AMNT PAIN NOTED PAIN PRSNT: CPT | Mod: CPTII,S$GLB,, | Performed by: NURSE PRACTITIONER

## 2024-05-16 PROCEDURE — 87088 URINE BACTERIA CULTURE: CPT | Performed by: NURSE PRACTITIONER

## 2024-05-16 PROCEDURE — 1101F PT FALLS ASSESS-DOCD LE1/YR: CPT | Mod: CPTII,S$GLB,, | Performed by: NURSE PRACTITIONER

## 2024-05-16 PROCEDURE — 3079F DIAST BP 80-89 MM HG: CPT | Mod: CPTII,S$GLB,, | Performed by: NURSE PRACTITIONER

## 2024-05-16 PROCEDURE — 87077 CULTURE AEROBIC IDENTIFY: CPT | Performed by: NURSE PRACTITIONER

## 2024-05-16 PROCEDURE — 99213 OFFICE O/P EST LOW 20 MIN: CPT | Mod: S$GLB,,, | Performed by: NURSE PRACTITIONER

## 2024-05-16 PROCEDURE — 3288F FALL RISK ASSESSMENT DOCD: CPT | Mod: CPTII,S$GLB,, | Performed by: NURSE PRACTITIONER

## 2024-05-16 PROCEDURE — 81003 URINALYSIS AUTO W/O SCOPE: CPT | Mod: QW,S$GLB,, | Performed by: NURSE PRACTITIONER

## 2024-05-16 PROCEDURE — 87186 SC STD MICRODIL/AGAR DIL: CPT | Performed by: NURSE PRACTITIONER

## 2024-05-16 PROCEDURE — 1157F ADVNC CARE PLAN IN RCRD: CPT | Mod: CPTII,S$GLB,, | Performed by: NURSE PRACTITIONER

## 2024-05-16 PROCEDURE — 3074F SYST BP LT 130 MM HG: CPT | Mod: CPTII,S$GLB,, | Performed by: NURSE PRACTITIONER

## 2024-05-16 PROCEDURE — 87086 URINE CULTURE/COLONY COUNT: CPT | Performed by: NURSE PRACTITIONER

## 2024-05-16 PROCEDURE — 1159F MED LIST DOCD IN RCRD: CPT | Mod: CPTII,S$GLB,, | Performed by: NURSE PRACTITIONER

## 2024-05-16 RX ORDER — CIPROFLOXACIN 500 MG/1
500 TABLET ORAL EVERY 12 HOURS
Qty: 10 TABLET | Refills: 0 | Status: SHIPPED | OUTPATIENT
Start: 2024-05-16 | End: 2024-05-21

## 2024-05-16 RX ORDER — AMOXICILLIN 500 MG/1
500 CAPSULE ORAL EVERY 8 HOURS
COMMUNITY
Start: 2024-05-01 | End: 2024-06-17

## 2024-05-16 RX ORDER — NITROFURANTOIN 25; 75 MG/1; MG/1
100 CAPSULE ORAL EVERY 12 HOURS
COMMUNITY
Start: 2024-05-08 | End: 2024-05-16 | Stop reason: ALTCHOICE

## 2024-05-16 NOTE — PROGRESS NOTES
Subjective:       Patient ID: Sosa Viramontes is a 73 y.o. female.    Chief Complaint: Urinary Tract Infection (1 week)    Sosa Viramontes presents to the clinic today for same day for UTI concerns  Established within the clinic, new patient to myself  Under the care of the following:  PCP: Dr. Mckinley  OBGYN: Dr. Carr  Urogyn: [facility]   Otolaryngologist: Dr. Juarez     Patient Active Problem List  Diagnosis  · Chronic constipation  · Vaginal atrophy  · S/P RA-diagnostic laparoscopy, A&P repair, MUS, and cystoscopy  · SVT (supraventricular tachycardia)  · Williamson's esophagus  · Diverticulosis of colon  · Gastroesophageal reflux disease  · Generalized anxiety disorder  · Hemorrhoids without complication  · Moderate mixed hyperlipidemia not requiring statin therapy  · Lumbar radiculopathy  · Microscopic colitis  · Mild cognitive disorder  · Osteoarthritis  · Primary insomnia  · Psoriasis  · Chronic rhinitis  · Recurrent UTI  · Vertigo  · Leg cramps  · Abdominal adhesions  · Hydradenitis  · Prediabetes  · Osteopenia of multiple sites  · History of hypotension    History of recurrent UTIs  Previous cultures reviewed, has seen GYN/UROGYN in the past   Previously completed course of Macrobid 100 mg BID x 7 days: was seen at an urgent care/clinic in [place] 5/8/2024 for UTI- reports was informed that her culture was + E Coli   Currently on Was scheduled to have a root canal today that she rescheduled for next week- was prescribed Amoxicillin 500 mg Q8hrs. Took x 4 days and stopped while taking Macrobid. Rescheduled appointment for next week  History of colitis, wears boxer brief underwear that is nylon like material       Review of Systems    Patient Active Problem List   Diagnosis    Chronic constipation    Vaginal atrophy    S/P RA-diagnostic laparoscopy, A&P repair, MUS, and cystoscopy    SVT (supraventricular tachycardia)    Williamson's esophagus    Diverticulosis of colon    Gastroesophageal reflux disease  "   Generalized anxiety disorder    Hemorrhoids without complication    Moderate mixed hyperlipidemia not requiring statin therapy    Lumbar radiculopathy    Microscopic colitis    Mild cognitive disorder    Osteoarthritis    Primary insomnia    Psoriasis    Chronic rhinitis    Recurrent UTI    Vertigo    Leg cramps    Abdominal adhesions    Hydradenitis    Prediabetes    Osteopenia of multiple sites    History of hypotension       Objective:      Physical Exam  Constitutional:       General: She is not in acute distress.     Appearance: Normal appearance.   Cardiovascular:      Rate and Rhythm: Normal rate.   Pulmonary:      Effort: Pulmonary effort is normal. No respiratory distress.   Skin:     General: Skin is warm and dry.      Findings: No rash.   Neurological:      Mental Status: She is alert and oriented to person, place, and time.   Psychiatric:         Mood and Affect: Mood normal.         Behavior: Behavior normal.         Lab Results   Component Value Date    WBC 6.27 04/02/2024    HGB 14.6 04/02/2024    HCT 44.2 04/02/2024     04/02/2024    CHOL 205 (H) 04/02/2024    TRIG 215 (H) 04/02/2024    HDL 36 (L) 04/02/2024    ALT 22 04/02/2024    AST 15 04/02/2024     04/02/2024    K 4.5 04/02/2024     04/02/2024    CREATININE 0.9 04/02/2024    BUN 11 04/02/2024    CO2 25 04/02/2024    TSH 0.838 04/02/2024    HGBA1C 5.5 04/02/2024     The 10-year ASCVD risk score (Sheree LOPEZ, et al., 2019) is: 14.7%    Values used to calculate the score:      Age: 73 years      Sex: Female      Is Non- : No      Diabetic: No      Tobacco smoker: No      Systolic Blood Pressure: 128 mmHg      Is BP treated: No      HDL Cholesterol: 36 mg/dL      Total Cholesterol: 205 mg/dL  Visit Vitals  /82 (BP Location: Left arm, Patient Position: Sitting, BP Method: Medium (Manual))   Pulse 68   Temp 98.4 °F (36.9 °C) (Oral)   Resp 12   Ht 5' 9" (1.753 m)   Wt 88.8 kg (195 lb 11.2 oz)   LMP  " (LMP Unknown)   SpO2 97%   BMI 28.90 kg/m²      Assessment:       1. UTI symptoms    2. History of recurrent UTI (urinary tract infection)        Plan:       1. UTI symptoms  -     POCT Urinalysis(Instrument)  -     Urine culture  -     ciprofloxacin HCl (CIPRO) 500 MG tablet; Take 1 tablet (500 mg total) by mouth every 12 (twelve) hours. for 5 days  Dispense: 10 tablet; Refill: 0  UA: +hematuria, leukocytes, nitrites- will send for culture  Take Cipro as prescribed with food- notify office of development of leg pains.   Continue probiotic   Change to cotton underwear: discussed further prevention measures.   2. History of recurrent UTI (urinary tract infection)  -     ciprofloxacin HCl (CIPRO) 500 MG tablet; Take 1 tablet (500 mg total) by mouth every 12 (twelve) hours. for 5 days  Dispense: 10 tablet; Refill: 0       No follow-ups on file.      Future Appointments       Date Provider Specialty Appt Notes    10/9/2024 Georgette Mckinley MD Family Medicine 6m f/u

## 2024-05-16 NOTE — PATIENT INSTRUCTIONS
UTI prevention recommendations  Drink more water (2 to 4 bottles) to dilute the bacteria that are replicating. This will also help you urinate more often if you tend to hold your bladder.   Timed voiding (which means- Urinate every 2 hours during the day) to rid the bladder of bacteria before they multiply and start to stick to your bladder walls and cause more symptoms and problems  Avoid pads if possible or wear thinner pads and change them more often  Loose fitting clothing, cotton under wear

## 2024-05-19 LAB — BACTERIA UR CULT: ABNORMAL

## 2024-05-20 DIAGNOSIS — Z16.12 UTI DUE TO EXTENDED-SPECTRUM BETA LACTAMASE (ESBL) PRODUCING ESCHERICHIA COLI: Primary | ICD-10-CM

## 2024-05-20 DIAGNOSIS — B96.29 UTI DUE TO EXTENDED-SPECTRUM BETA LACTAMASE (ESBL) PRODUCING ESCHERICHIA COLI: Primary | ICD-10-CM

## 2024-05-20 DIAGNOSIS — N39.0 UTI DUE TO EXTENDED-SPECTRUM BETA LACTAMASE (ESBL) PRODUCING ESCHERICHIA COLI: Primary | ICD-10-CM

## 2024-05-20 RX ORDER — SULFAMETHOXAZOLE AND TRIMETHOPRIM 800; 160 MG/1; MG/1
1 TABLET ORAL 2 TIMES DAILY
Qty: 10 TABLET | Refills: 0 | Status: SHIPPED | OUTPATIENT
Start: 2024-05-20 | End: 2024-06-17 | Stop reason: SDUPTHER

## 2024-05-24 ENCOUNTER — E-VISIT (OUTPATIENT)
Dept: FAMILY MEDICINE | Facility: CLINIC | Age: 74
End: 2024-05-24
Payer: MEDICARE

## 2024-05-24 ENCOUNTER — TELEPHONE (OUTPATIENT)
Dept: FAMILY MEDICINE | Facility: CLINIC | Age: 74
End: 2024-05-24
Payer: MEDICARE

## 2024-05-24 DIAGNOSIS — B37.0 ORAL THRUSH: Primary | ICD-10-CM

## 2024-05-24 DIAGNOSIS — B37.9 ANTIBIOTIC-INDUCED YEAST INFECTION: ICD-10-CM

## 2024-05-24 DIAGNOSIS — T36.95XA ANTIBIOTIC-INDUCED YEAST INFECTION: ICD-10-CM

## 2024-05-24 PROCEDURE — 99421 OL DIG E/M SVC 5-10 MIN: CPT | Mod: ,,, | Performed by: NURSE PRACTITIONER

## 2024-05-24 RX ORDER — NYSTATIN 100000 [USP'U]/ML
4 SUSPENSION ORAL
Qty: 200 ML | Refills: 0 | Status: SHIPPED | OUTPATIENT
Start: 2024-05-24 | End: 2024-06-17

## 2024-05-24 RX ORDER — FLUCONAZOLE 150 MG/1
150 TABLET ORAL DAILY
Qty: 1 TABLET | Refills: 0 | Status: SHIPPED | OUTPATIENT
Start: 2024-05-24 | End: 2024-05-25

## 2024-05-24 NOTE — PROGRESS NOTES
Subjective:       Patient ID: Sosa Viramontes is a 73 y.o. female.    Chief Complaint: URI (Entered automatically based on patient selection in Patient Portal.)    HPI  Review of Systems    Patient Active Problem List   Diagnosis    Chronic constipation    Vaginal atrophy    S/P RA-diagnostic laparoscopy, A&P repair, MUS, and cystoscopy    SVT (supraventricular tachycardia)    Williamson's esophagus    Diverticulosis of colon    Gastroesophageal reflux disease    Generalized anxiety disorder    Hemorrhoids without complication    Moderate mixed hyperlipidemia not requiring statin therapy    Lumbar radiculopathy    Microscopic colitis    Mild cognitive disorder    Osteoarthritis    Primary insomnia    Psoriasis    Chronic rhinitis    Recurrent UTI    Vertigo    Leg cramps    Abdominal adhesions    Hydradenitis    Prediabetes    Osteopenia of multiple sites    History of hypotension       Objective:      Physical Exam    Lab Results   Component Value Date    WBC 6.27 04/02/2024    HGB 14.6 04/02/2024    HCT 44.2 04/02/2024     04/02/2024    CHOL 205 (H) 04/02/2024    TRIG 215 (H) 04/02/2024    HDL 36 (L) 04/02/2024    ALT 22 04/02/2024    AST 15 04/02/2024     04/02/2024    K 4.5 04/02/2024     04/02/2024    CREATININE 0.9 04/02/2024    BUN 11 04/02/2024    CO2 25 04/02/2024    TSH 0.838 04/02/2024    HGBA1C 5.5 04/02/2024     The 10-year ASCVD risk score (Sheree LOPEZ, et al., 2019) is: 14.7%    Values used to calculate the score:      Age: 73 years      Sex: Female      Is Non- : No      Diabetic: No      Tobacco smoker: No      Systolic Blood Pressure: 128 mmHg      Is BP treated: No      HDL Cholesterol: 36 mg/dL      Total Cholesterol: 205 mg/dL  Visit Vitals  LMP  (LMP Unknown)      Assessment:       1. Oral thrush    2. Antibiotic-induced yeast infection        Plan:       1. Oral thrush  -     fluconazole (DIFLUCAN) 150 MG Tab; Take 1 tablet (150 mg total) by mouth once  daily. for 1 day  Dispense: 1 tablet; Refill: 0  -     nystatin (MYCOSTATIN) 100,000 unit/mL suspension; Take 4 mLs (400,000 Units total) by mouth 4 (four) times daily with meals and nightly.  Dispense: 200 mL; Refill: 0    2. Antibiotic-induced yeast infection  -     fluconazole (DIFLUCAN) 150 MG Tab; Take 1 tablet (150 mg total) by mouth once daily. for 1 day  Dispense: 1 tablet; Refill: 0  -     nystatin (MYCOSTATIN) 100,000 unit/mL suspension; Take 4 mLs (400,000 Units total) by mouth 4 (four) times daily with meals and nightly.  Dispense: 200 mL; Refill: 0     Sosa,   I have sent in an oral fluconazole tablet as well as Nystatin Solution- this you will swish 4 times daily. Attempt to retain in mouth as long as possible.  Continue warm salt water gargles.   Worsening of your symptoms especially with any difficulty swallowing, speaking or breathing present to the nearest ER.   Ashley NP-C   No follow-ups on file.      Future Appointments       Date Provider Specialty Appt Notes    10/9/2024 Georgette Mckinley MD Family Medicine 6m f/u

## 2024-05-24 NOTE — TELEPHONE ENCOUNTER
"----- Message from Mickieandra Walden sent at 5/24/2024  7:59 AM CDT -----  Regarding: pt advice  Contact: [phone]  Pt is calling. She is stating she developed "thrush" in her mouth from the UTI antibiotics. She is wondering what she should do to get rid of this? Please give her a call back soon.  "

## 2024-05-24 NOTE — TELEPHONE ENCOUNTER
Spoke with pt in regards to thrush s/s. Advised pt to checked mychart as e visit link sent to complete. Pt voiced understanding.    activity

## 2024-06-14 ENCOUNTER — HOSPITAL ENCOUNTER (OUTPATIENT)
Dept: RADIOLOGY | Facility: HOSPITAL | Age: 74
Discharge: HOME | End: 2024-06-14
Attending: STUDENT IN AN ORGANIZED HEALTH CARE EDUCATION/TRAINING PROGRAM
Payer: MEDICARE

## 2024-06-14 DIAGNOSIS — Z12.31 SCREENING MAMMOGRAM FOR BREAST CANCER: ICD-10-CM

## 2024-06-14 PROCEDURE — 77067 SCR MAMMO BI INCL CAD: CPT | Mod: TC

## 2024-06-14 PROCEDURE — 77063 BREAST TOMOSYNTHESIS BI: CPT | Mod: 26,,, | Performed by: RADIOLOGY

## 2024-06-14 PROCEDURE — 77067 SCR MAMMO BI INCL CAD: CPT | Mod: 26,,, | Performed by: RADIOLOGY

## 2024-06-17 ENCOUNTER — OFFICE VISIT (OUTPATIENT)
Dept: FAMILY MEDICINE | Facility: CLINIC | Age: 74
End: 2024-06-17
Payer: MEDICARE

## 2024-06-17 VITALS
WEIGHT: 193.5 LBS | DIASTOLIC BLOOD PRESSURE: 72 MMHG | BODY MASS INDEX: 28.66 KG/M2 | HEART RATE: 72 BPM | OXYGEN SATURATION: 97 % | SYSTOLIC BLOOD PRESSURE: 132 MMHG | HEIGHT: 69 IN | RESPIRATION RATE: 16 BRPM

## 2024-06-17 DIAGNOSIS — N30.00 ACUTE CYSTITIS WITHOUT HEMATURIA: ICD-10-CM

## 2024-06-17 DIAGNOSIS — Z79.899 LONG-TERM USE OF HIGH-RISK MEDICATION: ICD-10-CM

## 2024-06-17 DIAGNOSIS — K21.9 GASTROESOPHAGEAL REFLUX DISEASE, UNSPECIFIED WHETHER ESOPHAGITIS PRESENT: ICD-10-CM

## 2024-06-17 DIAGNOSIS — I73.9 PERIPHERAL VASCULAR DISEASE, UNSPECIFIED: ICD-10-CM

## 2024-06-17 DIAGNOSIS — N39.0 RECURRENT UTI: ICD-10-CM

## 2024-06-17 DIAGNOSIS — R39.9 SYMPTOMS INVOLVING URINARY SYSTEM: Primary | ICD-10-CM

## 2024-06-17 LAB
BILIRUBIN, UA POC OHS: NEGATIVE
BLOOD, UA POC OHS: NEGATIVE
CLARITY, UA POC OHS: CLEAR
COLOR, UA POC OHS: YELLOW
GLUCOSE, UA POC OHS: 100
KETONES, UA POC OHS: NEGATIVE
LEUKOCYTES, UA POC OHS: ABNORMAL
NITRITE, UA POC OHS: POSITIVE
PH, UA POC OHS: 6.5
PROTEIN, UA POC OHS: 30
SPECIFIC GRAVITY, UA POC OHS: >=1.03
UROBILINOGEN, UA POC OHS: 1

## 2024-06-17 PROCEDURE — 3008F BODY MASS INDEX DOCD: CPT | Mod: CPTII,S$GLB,, | Performed by: STUDENT IN AN ORGANIZED HEALTH CARE EDUCATION/TRAINING PROGRAM

## 2024-06-17 PROCEDURE — 87088 URINE BACTERIA CULTURE: CPT | Performed by: STUDENT IN AN ORGANIZED HEALTH CARE EDUCATION/TRAINING PROGRAM

## 2024-06-17 PROCEDURE — 1125F AMNT PAIN NOTED PAIN PRSNT: CPT | Mod: CPTII,S$GLB,, | Performed by: STUDENT IN AN ORGANIZED HEALTH CARE EDUCATION/TRAINING PROGRAM

## 2024-06-17 PROCEDURE — 87186 SC STD MICRODIL/AGAR DIL: CPT | Performed by: STUDENT IN AN ORGANIZED HEALTH CARE EDUCATION/TRAINING PROGRAM

## 2024-06-17 PROCEDURE — 81003 URINALYSIS AUTO W/O SCOPE: CPT | Mod: QW,S$GLB,, | Performed by: STUDENT IN AN ORGANIZED HEALTH CARE EDUCATION/TRAINING PROGRAM

## 2024-06-17 PROCEDURE — 87077 CULTURE AEROBIC IDENTIFY: CPT | Performed by: STUDENT IN AN ORGANIZED HEALTH CARE EDUCATION/TRAINING PROGRAM

## 2024-06-17 PROCEDURE — 3288F FALL RISK ASSESSMENT DOCD: CPT | Mod: CPTII,S$GLB,, | Performed by: STUDENT IN AN ORGANIZED HEALTH CARE EDUCATION/TRAINING PROGRAM

## 2024-06-17 PROCEDURE — 1157F ADVNC CARE PLAN IN RCRD: CPT | Mod: CPTII,S$GLB,, | Performed by: STUDENT IN AN ORGANIZED HEALTH CARE EDUCATION/TRAINING PROGRAM

## 2024-06-17 PROCEDURE — 3075F SYST BP GE 130 - 139MM HG: CPT | Mod: CPTII,S$GLB,, | Performed by: STUDENT IN AN ORGANIZED HEALTH CARE EDUCATION/TRAINING PROGRAM

## 2024-06-17 PROCEDURE — 99214 OFFICE O/P EST MOD 30 MIN: CPT | Mod: S$GLB,,, | Performed by: STUDENT IN AN ORGANIZED HEALTH CARE EDUCATION/TRAINING PROGRAM

## 2024-06-17 PROCEDURE — 1159F MED LIST DOCD IN RCRD: CPT | Mod: CPTII,S$GLB,, | Performed by: STUDENT IN AN ORGANIZED HEALTH CARE EDUCATION/TRAINING PROGRAM

## 2024-06-17 PROCEDURE — 3044F HG A1C LEVEL LT 7.0%: CPT | Mod: CPTII,S$GLB,, | Performed by: STUDENT IN AN ORGANIZED HEALTH CARE EDUCATION/TRAINING PROGRAM

## 2024-06-17 PROCEDURE — 3078F DIAST BP <80 MM HG: CPT | Mod: CPTII,S$GLB,, | Performed by: STUDENT IN AN ORGANIZED HEALTH CARE EDUCATION/TRAINING PROGRAM

## 2024-06-17 PROCEDURE — 1101F PT FALLS ASSESS-DOCD LE1/YR: CPT | Mod: CPTII,S$GLB,, | Performed by: STUDENT IN AN ORGANIZED HEALTH CARE EDUCATION/TRAINING PROGRAM

## 2024-06-17 PROCEDURE — 87086 URINE CULTURE/COLONY COUNT: CPT | Performed by: STUDENT IN AN ORGANIZED HEALTH CARE EDUCATION/TRAINING PROGRAM

## 2024-06-17 PROCEDURE — 99999 PR PBB SHADOW E&M-EST. PATIENT-LVL V: CPT | Mod: PBBFAC,,, | Performed by: STUDENT IN AN ORGANIZED HEALTH CARE EDUCATION/TRAINING PROGRAM

## 2024-06-17 RX ORDER — SULFAMETHOXAZOLE AND TRIMETHOPRIM 800; 160 MG/1; MG/1
1 TABLET ORAL 2 TIMES DAILY
Qty: 20 TABLET | Refills: 0 | Status: SHIPPED | OUTPATIENT
Start: 2024-06-17 | End: 2024-06-27

## 2024-06-17 RX ORDER — OMEPRAZOLE 40 MG/1
40 CAPSULE, DELAYED RELEASE ORAL EVERY MORNING
Qty: 90 CAPSULE | Refills: 3 | Status: SHIPPED | OUTPATIENT
Start: 2024-06-17

## 2024-06-17 NOTE — PROGRESS NOTES
Subjective:       Patient ID: Sosa Viramontes is a 73 y.o. female.    Chief Complaint: recurrent UTI    Recurrent UTI:  She has been on four different rounds of antibiotics  She has had a couple of extra pills (because she had resistant uti)  She started these pills.  Having burning and frequency again.  She is planning an out of town trip.  Bactrim seemed to work but only had 5 days and symptoms returned.            .  Patient Active Problem List   Diagnosis    Chronic constipation    Vaginal atrophy    S/P RA-diagnostic laparoscopy, A&P repair, MUS, and cystoscopy    SVT (supraventricular tachycardia)    Williamson's esophagus    Diverticulosis of colon    Gastroesophageal reflux disease    Generalized anxiety disorder    Hemorrhoids without complication    Moderate mixed hyperlipidemia not requiring statin therapy    Lumbar radiculopathy    Microscopic colitis    Mild cognitive disorder    Osteoarthritis    Primary insomnia    Psoriasis    Chronic rhinitis    Recurrent UTI    Vertigo    Leg cramps    Abdominal adhesions    Hydradenitis    Prediabetes    Osteopenia of multiple sites    History of hypotension    Peripheral vascular disease, unspecified     Sosa has a current medication list which includes the following prescription(s): calcium-vitamin d, estradiol, famotidine, fexofenadine, magnesium oxide, meclizine, montelukast, trazodone, omeprazole, and sulfamethoxazole-trimethoprim 800-160mg.    Review of Systems   Constitutional:  Negative for activity change and appetite change.   Respiratory:  Negative for shortness of breath.    Cardiovascular:  Negative for chest pain.   Gastrointestinal:  Negative for abdominal pain.   Genitourinary:  Negative for dysuria.   Integumentary:  Negative for rash.   Psychiatric/Behavioral:  Negative for dysphoric mood and sleep disturbance. The patient is not nervous/anxious.          Health Maintenance Due   Topic Date Due    TETANUS VACCINE  Never done    Shingles Vaccine (1  of 2) Never done    RSV Vaccine (Age 60+ and Pregnant patients) (1 - 1-dose 60+ series) Never done    COVID-19 Vaccine (4 - 2023-24 season) 09/01/2023      Health Maintenance reviewed and discussed- encouraged vaccines.     Objective:      Physical Exam  Constitutional:       General: She is not in acute distress.     Appearance: Normal appearance. She is not ill-appearing.   Eyes:      Conjunctiva/sclera: Conjunctivae normal.   Cardiovascular:      Rate and Rhythm: Normal rate and regular rhythm.      Heart sounds: Normal heart sounds. No murmur heard.  Pulmonary:      Effort: Pulmonary effort is normal. No respiratory distress.      Breath sounds: Normal breath sounds.   Musculoskeletal:      Right lower leg: No edema.      Left lower leg: No edema.   Skin:     General: Skin is warm and dry.   Neurological:      Mental Status: She is alert. Mental status is at baseline.      Gait: Gait normal.   Psychiatric:         Mood and Affect: Mood normal.         Behavior: Behavior normal.         Thought Content: Thought content normal.         Judgment: Judgment normal.         Assessment:       1. Symptoms involving urinary system    2. Gastroesophageal reflux disease, unspecified whether esophagitis present    3. Recurrent UTI    4. Long-term use of high-risk medication    5. Peripheral vascular disease, unspecified        Plan:       1. Symptoms involving urinary system  -     POCT Urinalysis(Instrument)  -     Urine culture    2. Gastroesophageal reflux disease, unspecified whether esophagitis present  Assessment & Plan:  Stable. Continue current medications and regular followup.      Orders:  -     omeprazole (PRILOSEC) 40 MG capsule; Take 1 capsule (40 mg total) by mouth every morning.  Dispense: 90 capsule; Refill: 3    3. Recurrent UTI  Assessment & Plan:  Referral to infectious disease and urology  Repeat urine and then treat again  Check in on renal function before using another round of bactrim    Orders:  -      Cancel: Ambulatory referral/consult to Urology; Future; Expected date: 06/24/2024  -     Cancel: Ambulatory referral/consult to Infectious Disease; Future; Expected date: 06/24/2024  -     Ambulatory referral/consult to Infectious Disease; Future; Expected date: 06/24/2024  -     Ambulatory referral/consult to Urology; Future; Expected date: 06/24/2024    4. Long-term use of high-risk medication  -     Basic metabolic panel; Future; Expected date: 06/17/2024    5. Peripheral vascular disease, unspecified  Assessment & Plan:  Stable. Continue current medications and regular followup.  Continue risk factor management

## 2024-06-17 NOTE — ASSESSMENT & PLAN NOTE
Referral to infectious disease and urology  Repeat urine and then treat again  Check in on renal function before using another round of bactrim

## 2024-06-17 NOTE — PATIENT INSTRUCTIONS

## 2024-06-18 LAB — BACTERIA UR CULT: ABNORMAL

## 2024-06-20 LAB — BACTERIA UR CULT: ABNORMAL

## 2024-06-21 ENCOUNTER — PATIENT MESSAGE (OUTPATIENT)
Dept: FAMILY MEDICINE | Facility: CLINIC | Age: 74
End: 2024-06-21
Payer: MEDICARE

## 2024-06-21 DIAGNOSIS — N39.0 RECURRENT UTI: Primary | ICD-10-CM

## 2024-06-24 ENCOUNTER — LAB VISIT (OUTPATIENT)
Dept: LAB | Facility: HOSPITAL | Age: 74
End: 2024-06-24
Attending: STUDENT IN AN ORGANIZED HEALTH CARE EDUCATION/TRAINING PROGRAM
Payer: MEDICARE

## 2024-06-24 ENCOUNTER — PATIENT MESSAGE (OUTPATIENT)
Dept: FAMILY MEDICINE | Facility: CLINIC | Age: 74
End: 2024-06-24
Payer: MEDICARE

## 2024-06-24 DIAGNOSIS — Z79.899 LONG-TERM USE OF HIGH-RISK MEDICATION: ICD-10-CM

## 2024-06-24 LAB
ANION GAP SERPL CALC-SCNC: 8 MMOL/L (ref 8–16)
BUN SERPL-MCNC: 15 MG/DL (ref 8–23)
CALCIUM SERPL-MCNC: 10 MG/DL (ref 8.7–10.5)
CHLORIDE SERPL-SCNC: 106 MMOL/L (ref 95–110)
CO2 SERPL-SCNC: 25 MMOL/L (ref 23–29)
CREAT SERPL-MCNC: 1 MG/DL (ref 0.5–1.4)
EST. GFR  (NO RACE VARIABLE): 59.1 ML/MIN/1.73 M^2
GLUCOSE SERPL-MCNC: 84 MG/DL (ref 70–110)
POTASSIUM SERPL-SCNC: 4.5 MMOL/L (ref 3.5–5.1)
SODIUM SERPL-SCNC: 139 MMOL/L (ref 136–145)

## 2024-06-24 PROCEDURE — 36415 COLL VENOUS BLD VENIPUNCTURE: CPT | Performed by: STUDENT IN AN ORGANIZED HEALTH CARE EDUCATION/TRAINING PROGRAM

## 2024-06-24 PROCEDURE — 80048 BASIC METABOLIC PNL TOTAL CA: CPT | Performed by: STUDENT IN AN ORGANIZED HEALTH CARE EDUCATION/TRAINING PROGRAM

## 2024-06-24 RX ORDER — NITROFURANTOIN 25; 75 MG/1; MG/1
100 CAPSULE ORAL 2 TIMES DAILY
Qty: 20 CAPSULE | Refills: 0 | Status: SHIPPED | OUTPATIENT
Start: 2024-06-24 | End: 2024-07-04

## 2024-06-26 ENCOUNTER — PATIENT MESSAGE (OUTPATIENT)
Dept: FAMILY MEDICINE | Facility: CLINIC | Age: 74
End: 2024-06-26
Payer: MEDICARE

## 2024-07-01 ENCOUNTER — OFFICE VISIT (OUTPATIENT)
Dept: FAMILY MEDICINE | Facility: CLINIC | Age: 74
End: 2024-07-01
Payer: MEDICARE

## 2024-07-01 VITALS
HEART RATE: 74 BPM | OXYGEN SATURATION: 96 % | HEIGHT: 69 IN | DIASTOLIC BLOOD PRESSURE: 76 MMHG | RESPIRATION RATE: 16 BRPM | WEIGHT: 194 LBS | SYSTOLIC BLOOD PRESSURE: 138 MMHG | BODY MASS INDEX: 28.73 KG/M2

## 2024-07-01 DIAGNOSIS — N39.0 RECURRENT UTI: Primary | ICD-10-CM

## 2024-07-01 DIAGNOSIS — N95.2 VAGINAL ATROPHY: ICD-10-CM

## 2024-07-01 PROCEDURE — 99213 OFFICE O/P EST LOW 20 MIN: CPT | Mod: S$GLB,,, | Performed by: NURSE PRACTITIONER

## 2024-07-01 PROCEDURE — 3008F BODY MASS INDEX DOCD: CPT | Mod: CPTII,S$GLB,, | Performed by: NURSE PRACTITIONER

## 2024-07-01 PROCEDURE — 1157F ADVNC CARE PLAN IN RCRD: CPT | Mod: CPTII,S$GLB,, | Performed by: NURSE PRACTITIONER

## 2024-07-01 PROCEDURE — 3075F SYST BP GE 130 - 139MM HG: CPT | Mod: CPTII,S$GLB,, | Performed by: NURSE PRACTITIONER

## 2024-07-01 PROCEDURE — 99999 PR PBB SHADOW E&M-EST. PATIENT-LVL III: CPT | Mod: PBBFAC,,, | Performed by: NURSE PRACTITIONER

## 2024-07-01 PROCEDURE — 3044F HG A1C LEVEL LT 7.0%: CPT | Mod: CPTII,S$GLB,, | Performed by: NURSE PRACTITIONER

## 2024-07-01 PROCEDURE — 3288F FALL RISK ASSESSMENT DOCD: CPT | Mod: CPTII,S$GLB,, | Performed by: NURSE PRACTITIONER

## 2024-07-01 PROCEDURE — 1101F PT FALLS ASSESS-DOCD LE1/YR: CPT | Mod: CPTII,S$GLB,, | Performed by: NURSE PRACTITIONER

## 2024-07-01 PROCEDURE — 3078F DIAST BP <80 MM HG: CPT | Mod: CPTII,S$GLB,, | Performed by: NURSE PRACTITIONER

## 2024-07-01 PROCEDURE — 1126F AMNT PAIN NOTED NONE PRSNT: CPT | Mod: CPTII,S$GLB,, | Performed by: NURSE PRACTITIONER

## 2024-07-01 PROCEDURE — 1159F MED LIST DOCD IN RCRD: CPT | Mod: CPTII,S$GLB,, | Performed by: NURSE PRACTITIONER

## 2024-07-01 NOTE — PROGRESS NOTES
Subjective:       Patient ID: Sosa Viramontes is a 73 y.o. female.    Chief Complaint: Follow-up (F/U with UTI)     Sosa Viramontes presents to the clinic today UTI follow up     Under the care of the following:  PCP: Dr. Mckinley  OBGYN: Dr. Carr  Urogyn: [facility]   Otolaryngologist: Dr. Juarez      Patient Active Problem List  Diagnosis  ·Chronic constipation  ·Vaginal atrophy  ·S/P RA-diagnostic laparoscopy, A&P repair, MUS, and cystoscopy  ·SVT (supraventricular tachycardia)  ·Williamson's esophagus  ·Diverticulosis of colon  ·Gastroesophageal reflux disease  ·Generalized anxiety disorder  ·Hemorrhoids without complication  ·Moderate mixed hyperlipidemia not requiring statin therapy  ·Lumbar radiculopathy  ·Microscopic colitis  ·Mild cognitive disorder  ·Osteoarthritis  ·Primary insomnia  ·Psoriasis  ·Chronic rhinitis  ·Recurrent UTI  ·Vertigo  ·Leg cramps  ·Abdominal adhesions  ·Hydradenitis  ·Prediabetes  ·Osteopenia of multiple sites  ·History of hypotension       History of recurrent UTIs  Previous cultures reviewed, has seen GYN/UROGYN in the past   Previously was taking Bactrim and was changed to Macrobid. Reports that since this change she is no longer experiencing any dysuria. She does reports that her urine just feels hot. Feels as if her bladder is irritated.  Most recent UTI occurred following intake of a tahira. completed course of Macrobid 100 mg BID x 7 days: was seen at an urgent care/clinic in [place]   History of colitis, wears boxer brief underwear that is nylon like material   Has pending appointment with infectious disease as well as urology            Review of Systems    Patient Active Problem List   Diagnosis    Chronic constipation    Vaginal atrophy    S/P RA-diagnostic laparoscopy, A&P repair, MUS, and cystoscopy    SVT (supraventricular tachycardia)    Williamson's esophagus    Diverticulosis of colon    Gastroesophageal reflux disease    Generalized anxiety disorder    Hemorrhoids  "without complication    Moderate mixed hyperlipidemia not requiring statin therapy    Lumbar radiculopathy    Microscopic colitis    Mild cognitive disorder    Osteoarthritis    Primary insomnia    Psoriasis    Chronic rhinitis    Recurrent UTI    Vertigo    Leg cramps    Abdominal adhesions    Hydradenitis    Prediabetes    Osteopenia of multiple sites    History of hypotension    Peripheral vascular disease, unspecified       Objective:      Physical Exam  Constitutional:       General: She is not in acute distress.     Appearance: Normal appearance.   Cardiovascular:      Rate and Rhythm: Normal rate.   Pulmonary:      Effort: Pulmonary effort is normal. No respiratory distress.   Skin:     General: Skin is warm and dry.      Findings: No rash.   Neurological:      General: No focal deficit present.      Mental Status: She is alert and oriented to person, place, and time.   Psychiatric:         Mood and Affect: Mood normal.         Behavior: Behavior normal.         Lab Results   Component Value Date    WBC 6.27 04/02/2024    HGB 14.6 04/02/2024    HCT 44.2 04/02/2024     04/02/2024    CHOL 205 (H) 04/02/2024    TRIG 215 (H) 04/02/2024    HDL 36 (L) 04/02/2024    ALT 22 04/02/2024    AST 15 04/02/2024     06/24/2024    K 4.5 06/24/2024     06/24/2024    CREATININE 1.0 06/24/2024    BUN 15 06/24/2024    CO2 25 06/24/2024    TSH 0.838 04/02/2024    HGBA1C 5.5 04/02/2024     The 10-year ASCVD risk score (Sheree LOPEZ, et al., 2019) is: 16.8%    Values used to calculate the score:      Age: 73 years      Sex: Female      Is Non- : No      Diabetic: No      Tobacco smoker: No      Systolic Blood Pressure: 138 mmHg      Is BP treated: No      HDL Cholesterol: 36 mg/dL      Total Cholesterol: 205 mg/dL  Visit Vitals  /76 (BP Location: Left arm, Patient Position: Sitting, BP Method: Medium (Manual))   Pulse 74   Resp 16   Ht 5' 9" (1.753 m)   Wt 88 kg (194 lb)   LMP  (LMP " Unknown)   SpO2 96%   BMI 28.65 kg/m²      Assessment:       1. Recurrent UTI    2. Vaginal atrophy        Plan:       1. Recurrent UTI  -     Urinalysis; Future; Expected date: 07/10/2024  -     Urine culture; Future; Expected date: 07/10/2024  Continue current medication regimen until all gone  Maintain hydration/push fluids  Repeat UA/culture next week   2. Vaginal atrophy  Continue topical HRT to vaginal tissue      No follow-ups on file.      Future Appointments       Date Provider Specialty Appt Notes    7/25/2024 Hialey Schmidt, NP Infectious Diseases np recurrent UTI    8/14/2024 Mahsa Leach MD Urology Recurrent UTI [N39.0]    10/9/2024 Georgette Mckinley MD Family Medicine 6m f/u

## 2024-07-08 PROBLEM — N39.0 RECURRENT UTI: Status: RESOLVED | Noted: 2023-11-22 | Resolved: 2024-07-08

## 2024-07-10 ENCOUNTER — LAB VISIT (OUTPATIENT)
Dept: LAB | Facility: HOSPITAL | Age: 74
End: 2024-07-10
Attending: NURSE PRACTITIONER
Payer: MEDICARE

## 2024-07-10 DIAGNOSIS — N39.0 RECURRENT UTI: ICD-10-CM

## 2024-07-10 LAB
BILIRUB UR QL STRIP: NEGATIVE
CLARITY UR: CLEAR
COLOR UR: YELLOW
GLUCOSE UR QL STRIP: NEGATIVE
HGB UR QL STRIP: NEGATIVE
KETONES UR QL STRIP: NEGATIVE
LEUKOCYTE ESTERASE UR QL STRIP: NEGATIVE
NITRITE UR QL STRIP: NEGATIVE
PH UR STRIP: 7 [PH] (ref 5–8)
PROT UR QL STRIP: NEGATIVE
SP GR UR STRIP: 1.01 (ref 1–1.03)
URN SPEC COLLECT METH UR: NORMAL
UROBILINOGEN UR STRIP-ACNC: NEGATIVE EU/DL

## 2024-07-10 PROCEDURE — 87086 URINE CULTURE/COLONY COUNT: CPT | Performed by: NURSE PRACTITIONER

## 2024-07-10 PROCEDURE — 81003 URINALYSIS AUTO W/O SCOPE: CPT | Performed by: NURSE PRACTITIONER

## 2024-07-11 LAB
BACTERIA UR CULT: NORMAL
BACTERIA UR CULT: NORMAL

## 2024-07-12 DIAGNOSIS — R09.81 SINUS CONGESTION: ICD-10-CM

## 2024-07-12 RX ORDER — MINERAL OIL
180 ENEMA (ML) RECTAL DAILY
Qty: 90 TABLET | Refills: 3 | Status: SHIPPED | OUTPATIENT
Start: 2024-07-12 | End: 2025-07-12

## 2024-07-12 NOTE — TELEPHONE ENCOUNTER
No care due was identified.  [facility] Embedded Care Due Messages. Reference number: [id number].   7/12/2024 9:16:58 AM CDT

## 2024-07-12 NOTE — TELEPHONE ENCOUNTER
----- Message from Fabiana Peters sent at 7/12/2024  9:03 AM CDT -----  e:  RX Refill Request        Who Called: pt        RX Name and Strength:   fexofenadine (ALLEGRA) 180 MG tablet      How is the patient currently taking it? (ex. 1XDay): 1 X Day        Is this a 30 day or 90 day RX: 30        Preferred Pharmacy with phone number:  [id number] [place] [address] [facility] [address] [facility] [address] (Ph: [phone])        Local or Mail Order: local        Ordering Provider: Elías PARTIDA         Would the patient rather a call back or a response via [facility]? call        Best Call Back Number: [phone]        Additional Information: call back

## 2024-07-12 NOTE — TELEPHONE ENCOUNTER
Refill Routing Note   Medication(s) are not appropriate for processing by [facility] for the following reason(s):        Outside of protocol    [facility] action(s):  Route               Appointments  past 12m or future 3m with PCP    Date Provider   Last Visit   6/17/2024 Georgette Mckinley MD   Next Visit   10/9/2024 Georgette Mckinley MD   ED visits in past 90 days: 0        Note composed:12:33 PM 07/12/2024

## 2024-08-08 NOTE — PROGRESS NOTES
[facility] Note    PCP: Georgette Mckinley MD    Chief Complaint: recurrent UTIs    SUBJECTIVE:       History of Present Illness:  Sosa Viramontes is a 73 y.o. female who presents to clinic for recurrent UTIs. She is New  to our clinic.     Has had 2 UTIs in the last 6 months.  6/17/24 - Enterococcus  5/16/24 - ESBL E Coli    She is on vaginal estrogen cream.    UTI symptoms include dysuria, frequency and occasional incontinence. No gross hematuria.    No other bothersome urinary symptoms.     She does have a BM every day.   Drinks about 50 oz of water daily.     She did have urethral sling and A/P prolapse repair for similar issues in 2019 with Dr. Zapien. Attempted robotic ASC however unable to be performed secondary to adhesions.     UA today: negative  PVR today: 270 cc (catheterized)    Lab Results   Component Value Date    CREATININE 1.0 06/24/2024     Hx of HLD, PVD, GERD    Past medical, family, and social history reviewed as documented in chart with pertinent positive medical, family, and social history detailed in HPI.    Review of patient's allergies indicates:   Allergen Reactions    Celebrex [celecoxib] Other (See Comments)     States feels like having a heart attack       Past Medical History:   Diagnosis Date    Colitis     COVID-19 08/2021    GERD (gastroesophageal reflux disease)     Recurrent UTI     SVT (supraventricular tachycardia)     Syncope      Past Surgical History:   Procedure Laterality Date    ABLATION      BLADDER SUSPENSION  1998    CHOLECYSTECTOMY      CYSTOSCOPY N/A 7/16/2019    Procedure: CYSTOSCOPY;  Surgeon: Jarod Zapien MD;  Location: [facility] OR;  Service: OB/GYN;  Laterality: N/A;    EYE SURGERY      HYSTERECTOMY      1998    INSERTION OF MIDURETHRAL SLING N/A 7/16/2019    Procedure: SLING, MIDURETHRAL;  Surgeon: Jarod Zapien MD;  Location: [facility] OR;  Service: OB/GYN;  Laterality: N/A;    OOPHORECTOMY      ROBOT-ASSISTED LAPAROSCOPIC LYSIS OF  "ADHESIONS USING DA RHODA XI N/A 2019    Procedure: XI ROBOTIC LYSIS, ADHESIONS;  Surgeon: Jarod Zapien MD;  Location: [facility];  Service: OB/GYN;  Laterality: N/A;     Family History   Problem Relation Name Age of Onset    Cancer Mother Lisset Bean         Pancreatic    Hearing loss Mother Lisset Bean     Hypertension Mother Lisset Bean     COPD Father Amado Bean     Hearing loss Father Amado Bean     Breast cancer Maternal Aunt      Vaginal cancer Neg Hx      Endometrial cancer Neg Hx      Cervical cancer Neg Hx      Ovarian cancer Neg Hx       Social History     Tobacco Use    Smoking status: Former     Current packs/day: 0.00     Average packs/day: 1.3 packs/day for 36.0 years (45.0 ttl pk-yrs)     Types: Cigarettes     Start date:      Quit date:      Years since quittin.6    Smokeless tobacco: Never   Substance Use Topics    Alcohol use: Yes     Comment: rarely/ occ.     Drug use: Never        Review of Systems    OBJECTIVE:     Anticoagulation:  no    Estimated body mass index is 28.5 kg/m² as calculated from the following:    Height as of this encounter: 5' 9" (1.753 m).    Weight as of this encounter: 87.5 kg (193 lb).    Vital Signs (Most Recent)       Physical Exam  Vitals reviewed.   Constitutional:       General: She is not in acute distress.     Appearance: Normal appearance. She is not ill-appearing.   HENT:      Head: Normocephalic and atraumatic.   Eyes:      General: No scleral icterus.  Pulmonary:      Effort: Pulmonary effort is normal. No respiratory distress.   Abdominal:      Palpations: Abdomen is soft.   Genitourinary:     Comments: There is a grade 2 cystocele present. Mild vaginal atrophy. No LIZETH noted on exam.   Neurological:      Mental Status: She is alert and oriented to person, place, and time.   Psychiatric:         Mood and Affect: Mood normal.         Behavior: Behavior normal.         BMP  Lab Results   Component Value Date     2024    K 4.5 " 06/24/2024     06/24/2024    CO2 25 06/24/2024    BUN 15 06/24/2024    CREATININE 1.0 06/24/2024    CALCIUM 10.0 06/24/2024    ANIONGAP 8 06/24/2024    ESTGFRAFRICA >60.0 02/17/2022    EGFRNONAA >60.0 02/17/2022       Lab Results   Component Value Date    WBC 6.27 04/02/2024    HGB 14.6 04/02/2024    HCT 44.2 04/02/2024    MCV 91 04/02/2024     04/02/2024       Imaging:  No pertinent recent imaging available.    ASSESSMENT     1. Recurrent UTI      PLAN:     - Prior to recently had not had any issues with UTIs for about 1.5 years.   - She does not empty well and this is likely secondary to her recurrent prolapse   - From a voiding perspective she is asymptomatic  - We discussed double voiding and splinting  - At this point, she is not interested in repeat prolapse repair, however if UTIs continue then this would likely be her next step   - Will check renal US now  - Will have her follow up in 3 months for PVR check   - Discussed conservative management of UTIs as well - continue estrace cream, drink at least 70 oz of fluids daily and have a daily BM movement     Mahsa Leach MD     Letter to Georgette Mckinley MD     Visit today included increased complexity associated with the care of the episodic problem recurrent UTI addressed and managing the longitudinal care of the patient due to the serious and/or complex managed problem(s).

## 2024-08-14 ENCOUNTER — OFFICE VISIT (OUTPATIENT)
Dept: UROLOGY | Facility: CLINIC | Age: 74
End: 2024-08-14
Payer: MEDICARE

## 2024-08-14 VITALS — WEIGHT: 193 LBS | HEIGHT: 69 IN | BODY MASS INDEX: 28.58 KG/M2

## 2024-08-14 DIAGNOSIS — N39.0 RECURRENT UTI: Primary | ICD-10-CM

## 2024-08-14 PROCEDURE — 3044F HG A1C LEVEL LT 7.0%: CPT | Mod: CPTII,S$GLB,, | Performed by: STUDENT IN AN ORGANIZED HEALTH CARE EDUCATION/TRAINING PROGRAM

## 2024-08-14 PROCEDURE — 99999 PR PBB SHADOW E&M-EST. PATIENT-LVL III: CPT | Mod: PBBFAC,,, | Performed by: STUDENT IN AN ORGANIZED HEALTH CARE EDUCATION/TRAINING PROGRAM

## 2024-08-14 PROCEDURE — 99204 OFFICE O/P NEW MOD 45 MIN: CPT | Mod: S$GLB,,, | Performed by: STUDENT IN AN ORGANIZED HEALTH CARE EDUCATION/TRAINING PROGRAM

## 2024-08-14 PROCEDURE — 1157F ADVNC CARE PLAN IN RCRD: CPT | Mod: CPTII,S$GLB,, | Performed by: STUDENT IN AN ORGANIZED HEALTH CARE EDUCATION/TRAINING PROGRAM

## 2024-08-14 PROCEDURE — 3288F FALL RISK ASSESSMENT DOCD: CPT | Mod: CPTII,S$GLB,, | Performed by: STUDENT IN AN ORGANIZED HEALTH CARE EDUCATION/TRAINING PROGRAM

## 2024-08-14 PROCEDURE — 3008F BODY MASS INDEX DOCD: CPT | Mod: CPTII,S$GLB,, | Performed by: STUDENT IN AN ORGANIZED HEALTH CARE EDUCATION/TRAINING PROGRAM

## 2024-08-14 PROCEDURE — 1101F PT FALLS ASSESS-DOCD LE1/YR: CPT | Mod: CPTII,S$GLB,, | Performed by: STUDENT IN AN ORGANIZED HEALTH CARE EDUCATION/TRAINING PROGRAM

## 2024-08-14 PROCEDURE — G2211 COMPLEX E/M VISIT ADD ON: HCPCS | Mod: S$GLB,,, | Performed by: STUDENT IN AN ORGANIZED HEALTH CARE EDUCATION/TRAINING PROGRAM

## 2024-08-14 PROCEDURE — 1126F AMNT PAIN NOTED NONE PRSNT: CPT | Mod: CPTII,S$GLB,, | Performed by: STUDENT IN AN ORGANIZED HEALTH CARE EDUCATION/TRAINING PROGRAM

## 2024-09-19 ENCOUNTER — PATIENT MESSAGE (OUTPATIENT)
Dept: FAMILY MEDICINE | Facility: CLINIC | Age: 74
End: 2024-09-19
Payer: MEDICARE

## 2024-09-19 DIAGNOSIS — G47.9 SLEEP DISTURBANCE: ICD-10-CM

## 2024-09-19 DIAGNOSIS — N95.2 VAGINAL ATROPHY: ICD-10-CM

## 2024-09-19 NOTE — TELEPHONE ENCOUNTER
Requested Prescriptions     Pending Prescriptions Disp Refills    estradioL (ESTRACE) 0.01 % (0.1 mg/gram) vaginal cream 42.5 g 1     Sig: Use 1/2  gram per vagina nightly x 2 weeks then 3 times per week    traZODone (DESYREL) 100 MG tablet 90 tablet 3     Sig: TAKE 1 TABLET BY MOUTH AT BEDTIME        Last office visit:07/01/2024 Elías  Next office visit:10/09/2024 Elíascarol ann Villalobos KIRSTEN 09/19/2024 11:22 AM

## 2024-09-19 NOTE — TELEPHONE ENCOUNTER
No care due was identified.  [facility] Embedded Care Due Messages. Reference number: [id number].   9/19/2024 11:23:38 AM CDT

## 2024-09-20 RX ORDER — ESTRADIOL 0.1 MG/G
CREAM VAGINAL
Qty: 42.5 G | Refills: 3 | Status: SHIPPED | OUTPATIENT
Start: 2024-09-20

## 2024-09-20 RX ORDER — TRAZODONE HYDROCHLORIDE 100 MG/1
TABLET ORAL
Qty: 90 TABLET | Refills: 3 | Status: SHIPPED | OUTPATIENT
Start: 2024-09-20

## 2024-09-20 NOTE — TELEPHONE ENCOUNTER
Refill Routing Note   Medication(s) are not appropriate for processing by [facility] for the following reason(s):        No active prescription written by provider    [facility] action(s):  Defer               Appointments  past 12m or future 3m with PCP    Date Provider   Last Visit   6/17/2024 Georgette Mckinley MD   Next Visit   10/9/2024 Georgette Mckinley MD   ED visits in past 90 days: 0        Note composed:9:40 PM 09/19/2024

## 2024-12-11 ENCOUNTER — OFFICE VISIT (OUTPATIENT)
Dept: FAMILY MEDICINE | Facility: CLINIC | Age: 74
End: 2024-12-11
Payer: MEDICARE

## 2024-12-11 VITALS
WEIGHT: 200.5 LBS | HEART RATE: 62 BPM | OXYGEN SATURATION: 97 % | SYSTOLIC BLOOD PRESSURE: 138 MMHG | DIASTOLIC BLOOD PRESSURE: 77 MMHG | RESPIRATION RATE: 16 BRPM | BODY MASS INDEX: 29.7 KG/M2 | HEIGHT: 69 IN

## 2024-12-11 DIAGNOSIS — F41.1 GENERALIZED ANXIETY DISORDER: ICD-10-CM

## 2024-12-11 DIAGNOSIS — R42 VERTIGO: ICD-10-CM

## 2024-12-11 DIAGNOSIS — N39.3 STRESS INCONTINENCE OF URINE: ICD-10-CM

## 2024-12-11 DIAGNOSIS — Z00.00 HEALTH CARE MAINTENANCE: ICD-10-CM

## 2024-12-11 DIAGNOSIS — E66.3 OVERWEIGHT (BMI 25.0-29.9): ICD-10-CM

## 2024-12-11 DIAGNOSIS — R73.03 PREDIABETES: Primary | ICD-10-CM

## 2024-12-11 DIAGNOSIS — K21.9 GASTROESOPHAGEAL REFLUX DISEASE, UNSPECIFIED WHETHER ESOPHAGITIS PRESENT: ICD-10-CM

## 2024-12-11 DIAGNOSIS — J31.0 CHRONIC RHINITIS: ICD-10-CM

## 2024-12-11 DIAGNOSIS — J00 ACUTE RHINITIS: ICD-10-CM

## 2024-12-11 DIAGNOSIS — N95.2 VAGINAL ATROPHY: ICD-10-CM

## 2024-12-11 PROCEDURE — 3075F SYST BP GE 130 - 139MM HG: CPT | Mod: CPTII,S$GLB,, | Performed by: STUDENT IN AN ORGANIZED HEALTH CARE EDUCATION/TRAINING PROGRAM

## 2024-12-11 PROCEDURE — 3078F DIAST BP <80 MM HG: CPT | Mod: CPTII,S$GLB,, | Performed by: STUDENT IN AN ORGANIZED HEALTH CARE EDUCATION/TRAINING PROGRAM

## 2024-12-11 PROCEDURE — 3008F BODY MASS INDEX DOCD: CPT | Mod: CPTII,S$GLB,, | Performed by: STUDENT IN AN ORGANIZED HEALTH CARE EDUCATION/TRAINING PROGRAM

## 2024-12-11 PROCEDURE — 3288F FALL RISK ASSESSMENT DOCD: CPT | Mod: CPTII,S$GLB,, | Performed by: STUDENT IN AN ORGANIZED HEALTH CARE EDUCATION/TRAINING PROGRAM

## 2024-12-11 PROCEDURE — 1101F PT FALLS ASSESS-DOCD LE1/YR: CPT | Mod: CPTII,S$GLB,, | Performed by: STUDENT IN AN ORGANIZED HEALTH CARE EDUCATION/TRAINING PROGRAM

## 2024-12-11 PROCEDURE — G0008 ADMIN INFLUENZA VIRUS VAC: HCPCS | Mod: S$GLB,,, | Performed by: STUDENT IN AN ORGANIZED HEALTH CARE EDUCATION/TRAINING PROGRAM

## 2024-12-11 PROCEDURE — 1159F MED LIST DOCD IN RCRD: CPT | Mod: CPTII,S$GLB,, | Performed by: STUDENT IN AN ORGANIZED HEALTH CARE EDUCATION/TRAINING PROGRAM

## 2024-12-11 PROCEDURE — 99214 OFFICE O/P EST MOD 30 MIN: CPT | Mod: S$GLB,,, | Performed by: STUDENT IN AN ORGANIZED HEALTH CARE EDUCATION/TRAINING PROGRAM

## 2024-12-11 PROCEDURE — 99999 PR PBB SHADOW E&M-EST. PATIENT-LVL III: CPT | Mod: PBBFAC,,, | Performed by: STUDENT IN AN ORGANIZED HEALTH CARE EDUCATION/TRAINING PROGRAM

## 2024-12-11 PROCEDURE — 1157F ADVNC CARE PLAN IN RCRD: CPT | Mod: CPTII,S$GLB,, | Performed by: STUDENT IN AN ORGANIZED HEALTH CARE EDUCATION/TRAINING PROGRAM

## 2024-12-11 PROCEDURE — 1126F AMNT PAIN NOTED NONE PRSNT: CPT | Mod: CPTII,S$GLB,, | Performed by: STUDENT IN AN ORGANIZED HEALTH CARE EDUCATION/TRAINING PROGRAM

## 2024-12-11 PROCEDURE — 90653 IIV ADJUVANT VACCINE IM: CPT | Mod: S$GLB,,, | Performed by: STUDENT IN AN ORGANIZED HEALTH CARE EDUCATION/TRAINING PROGRAM

## 2024-12-11 PROCEDURE — 3044F HG A1C LEVEL LT 7.0%: CPT | Mod: CPTII,S$GLB,, | Performed by: STUDENT IN AN ORGANIZED HEALTH CARE EDUCATION/TRAINING PROGRAM

## 2024-12-11 RX ORDER — MECLIZINE HYDROCHLORIDE 25 MG/1
25 TABLET ORAL EVERY 8 HOURS PRN
Qty: 90 TABLET | Refills: 3 | Status: SHIPPED | OUTPATIENT
Start: 2024-12-11

## 2024-12-11 RX ORDER — MONTELUKAST SODIUM 10 MG/1
10 TABLET ORAL NIGHTLY
Qty: 90 TABLET | Refills: 3 | Status: SHIPPED | OUTPATIENT
Start: 2024-12-11

## 2024-12-11 RX ORDER — MECLIZINE HYDROCHLORIDE 25 MG/1
25 TABLET ORAL EVERY 8 HOURS PRN
Qty: 30 TABLET | Refills: 1 | Status: CANCELLED | OUTPATIENT
Start: 2024-12-11

## 2024-12-11 RX ORDER — MONTELUKAST SODIUM 10 MG/1
10 TABLET ORAL NIGHTLY
Status: CANCELLED | OUTPATIENT
Start: 2024-12-11

## 2024-12-11 RX ORDER — FAMOTIDINE 40 MG/1
40 TABLET, FILM COATED ORAL DAILY
Qty: 90 TABLET | Refills: 3 | Status: SHIPPED | OUTPATIENT
Start: 2024-12-11

## 2024-12-11 RX ORDER — ESTRADIOL 0.1 MG/G
CREAM VAGINAL
Qty: 42.5 G | Refills: 3 | Status: CANCELLED | OUTPATIENT
Start: 2024-12-11

## 2024-12-11 RX ORDER — ESTRADIOL 0.1 MG/G
CREAM VAGINAL
Qty: 42.5 G | Refills: 3 | Status: SHIPPED | OUTPATIENT
Start: 2024-12-11

## 2024-12-11 RX ORDER — FAMOTIDINE 40 MG/1
40 TABLET, FILM COATED ORAL DAILY
Qty: 90 TABLET | Refills: 3 | Status: CANCELLED | OUTPATIENT
Start: 2024-12-11

## 2024-12-11 NOTE — PROGRESS NOTES
Subjective:       Patient ID: Sosa Viramontes is a 73 y.o. female.    Chief Complaint: Follow-up    History of Present Illness    CHIEF COMPLAINT:  Ms. Viramontes presents today for follow-up and to discuss weight concerns.    RECENT UPPER RESPIRATORY INFECTION:  She reports a recent upper respiratory infection that resulted in loss of voice. She was prescribed prednisone, which successfully restored her voice, and Ceftamir (antibiotic). A persistent cough remains as the primary symptom.    WEIGHT MANAGEMENT:  She reports a current weight of 200 lbs and expresses a desire to lose 20 lbs. She indicates a lack of motivation for weight loss and acknowledges overeating due to stress. She reports eating too much during meals and identifies as a slow eater. She expresses a preference for crunchy foods and salads, and states she is not a big meat eater. She experiences food chatter, constantly thinking about meals and food choices. She acknowledges difficulty with portion control during meals rather than excessive snacking.    URINARY ISSUES:  Her urologist recommends bladder surgery, which would be her third. She experiences urinary urgency without warning, stating that when the urge comes, she cannot stop it, though she is not leaking. She is not feeling the urge as severely as she used to. She is postponing the decision for surgery, indicating she does not plan to have the procedure within the next six months but acknowledges she may need to undergo it again in the future. A sonogram/ultrasound was ordered but not yet completed.    EMOTIONAL STATE:  She reports feeling depressed about her upcoming 74th birthday, struggling with accepting the aging process, particularly in relation to making decisions about her activities and lifestyle. She expresses difficulty in contemplating the need to transition from a two-wheeled to a three-wheeled motorcycle, viewing it as a sign of getting older. She acknowledges feeling good  overall, but is grappling with the emotional impact of age-related changes and decisions.    GASTROINTESTINAL HISTORY:  She has a history of abdominal adhesions and colitis. She reports variable gut responses to food intake, with some mornings resulting in urgent bowel movements within hours of eating. For example, she describes consuming yogurt with granola, which led to rushing to the bathroom two hours later.       Review of Systems   Constitutional:  Negative for activity change and appetite change.   Respiratory:  Negative for shortness of breath.    Cardiovascular:  Negative for chest pain.   Gastrointestinal:  Negative for abdominal pain.   Genitourinary:  Negative for dysuria.   Integumentary:  Negative for rash.   Psychiatric/Behavioral:  Negative for depressed mood and sleep disturbance. The patient is not nervous/anxious.       Patient Active Problem List   Diagnosis    Chronic constipation    Vaginal atrophy    S/P RA-diagnostic laparoscopy, A&P repair, MUS, and cystoscopy    SVT (supraventricular tachycardia)    Williamson's esophagus    Diverticulosis of colon    Gastroesophageal reflux disease    Generalized anxiety disorder    Hemorrhoids without complication    Moderate mixed hyperlipidemia not requiring statin therapy    Lumbar radiculopathy    Microscopic colitis    Mild cognitive disorder    Osteoarthritis    Primary insomnia    Psoriasis    Chronic rhinitis    Vertigo    Leg cramps    Abdominal adhesions    Hydradenitis    Prediabetes    Osteopenia of multiple sites    History of hypotension    Peripheral vascular disease, unspecified    Overweight (BMI 25.0-29.9)    Stress incontinence of urine     Sosa has a current medication list which includes the following prescription(s): calcium-vitamin d, fexofenadine, magnesium oxide, omeprazole, trazodone, estradiol, famotidine, meclizine, and montelukast.        Health Maintenance Due   Topic Date Due    TETANUS VACCINE  Never done    Shingles Vaccine (1  of 2) Never done    RSV Vaccine (Age 60+ and Pregnant patients) (1 - Risk 60-74 years 1-dose series) Never done    COVID-19 Vaccine (4 - 2024-25 season) 09/01/2024      Health Maintenance reviewed and discussed- encouraged vaccines.     Objective:      Physical Exam  Constitutional:       General: She is not in acute distress.     Appearance: Normal appearance. She is not ill-appearing.   Eyes:      Conjunctiva/sclera: Conjunctivae normal.   Cardiovascular:      Rate and Rhythm: Normal rate and regular rhythm.      Heart sounds: Normal heart sounds. No murmur heard.  Pulmonary:      Effort: Pulmonary effort is normal. No respiratory distress.      Breath sounds: Normal breath sounds. No wheezing or rales.   Musculoskeletal:      Right lower leg: No edema.      Left lower leg: No edema.   Skin:     General: Skin is warm and dry.   Neurological:      Mental Status: She is alert. Mental status is at baseline.      Gait: Gait normal.   Psychiatric:         Mood and Affect: Mood normal.         Behavior: Behavior normal.         Thought Content: Thought content normal.         Judgment: Judgment normal.         Assessment:       Assessment & Plan    1. Assessed patient's weight concerns and BMI, noting current BMI of 29 does not qualify for weight loss medication  2. Evaluated risks of diet medications given patient's history of abdominal adhesions and colitis; determined elevated risk for bowel obstruction  3. Considered weight loss medication for pre-diabetes, but likely to be denied due to well-controlled A1C           Plan:       1. Prediabetes  Assessment & Plan:  Lab Results   Component Value Date    HGBA1C 5.5 04/02/2024     Stable. Continue current diet and regular followup.            2. Vaginal atrophy  -     estradioL (ESTRACE) 0.01 % (0.1 mg/gram) vaginal cream; Use 1/2  gram per vagina nightly x 2 weeks then 3 times per week  Dispense: 42.5 g; Refill: 3    3. Gastroesophageal reflux disease, unspecified  whether esophagitis present  -     famotidine (PEPCID) 40 MG tablet; Take 1 tablet (40 mg total) by mouth once daily.  Dispense: 90 tablet; Refill: 3    4. Vertigo  Assessment & Plan:  Uses prn meclizine  Stable. Continue current medications and regular followup.      Orders:  -     meclizine (ANTIVERT) 25 mg tablet; Take 1 tablet (25 mg total) by mouth every 8 (eight) hours as needed for Dizziness.  Dispense: 90 tablet; Refill: 3    5. Health care maintenance  -     influenza (adjuvanted) (Fluad) 45 mcg/0.5 mL IM vaccine (> or = 64 yo) 0.5 mL    6. Acute rhinitis  -     montelukast (SINGULAIR) 10 mg tablet; Take 1 tablet (10 mg total) by mouth every evening.  Dispense: 90 tablet; Refill: 3    7. Generalized anxiety disorder  Assessment & Plan:  Stable. Continue current medications and regular followup.        8. Chronic rhinitis  Assessment & Plan:  Stable on singulair and allegra      9. Overweight (BMI 25.0-29.9)  Assessment & Plan:  Wt Readings from Last 6 Encounters:   12/11/24 90.9 kg (200 lb 8 oz)   08/14/24 87.5 kg (193 lb)   07/01/24 88 kg (194 lb)   06/17/24 87.8 kg (193 lb 8 oz)   05/16/24 88.8 kg (195 lb 11.2 oz)   04/02/24 87 kg (191 lb 11.2 oz)     Encouraged some diet changes and continued exercise.      10. Stress incontinence of urine  Assessment & Plan:  Sees urology and postponing surgery for now.           This note was generated with the assistance of ambient listening technology. Verbal consent was obtained by the patient and accompanying visitor(s) for the recording of patient appointment to facilitate this note. I attest to having reviewed and edited the generated note for accuracy, though some syntax or spelling errors may persist. Please contact the author of this note for any clarification.

## 2024-12-11 NOTE — ASSESSMENT & PLAN NOTE
Wt Readings from Last 6 Encounters:   12/11/24 90.9 kg (200 lb 8 oz)   08/14/24 87.5 kg (193 lb)   07/01/24 88 kg (194 lb)   06/17/24 87.8 kg (193 lb 8 oz)   05/16/24 88.8 kg (195 lb 11.2 oz)   04/02/24 87 kg (191 lb 11.2 oz)     Encouraged some diet changes and continued exercise.

## 2024-12-11 NOTE — ASSESSMENT & PLAN NOTE
Lab Results   Component Value Date    HGBA1C 5.5 04/02/2024     Stable. Continue current diet and regular followup.

## 2025-05-01 DIAGNOSIS — R73.03 PREDIABETES: ICD-10-CM

## 2025-05-15 DIAGNOSIS — K21.9 GASTROESOPHAGEAL REFLUX DISEASE, UNSPECIFIED WHETHER ESOPHAGITIS PRESENT: ICD-10-CM

## 2025-05-15 RX ORDER — OMEPRAZOLE 40 MG/1
40 CAPSULE, DELAYED RELEASE ORAL EVERY MORNING
Qty: 90 CAPSULE | Refills: 1 | Status: SHIPPED | OUTPATIENT
Start: 2025-05-15

## 2025-05-15 NOTE — TELEPHONE ENCOUNTER
Provider Staff:  Action required for this patient    Requires labs      Please see care gap opportunities below in Care Due Message.    Thanks!  [facility]     Appointments      Date Provider   Last Visit   12/11/2024 Georgette Mckinley MD   Next Visit   6/11/2025 Georgette Mckinley MD     Refill Decision Note   Sosa Viramontes  is requesting a refill authorization.  Brief Assessment and Rationale for Refill:  Approve     Medication Therapy Plan:        Comments:     Note composed:12:01 PM 05/15/2025

## 2025-05-15 NOTE — TELEPHONE ENCOUNTER
Care Due:                  Date            Visit Type   Department     Provider  --------------------------------------------------------------------------------                                EP -                              PRIMARY      [facility] FAM[facility]  Last Visit: 12-      CARE ([facility])   MEDICINE       Georgette Mckinley                              EP -                              PRIMARY      [facility]  Next Visit: 06-      CARE ([facility])   [facility]       Georgette Mckinley                                                            Last  Test          Frequency    Reason                     Performed    Due Date  --------------------------------------------------------------------------------    Cr..........  12 months..  famotidine...............  06- 06-    [facility] Embedded Care Due Messages. Reference number: [id number].   5/15/2025 9:03:14 AM CDT

## 2025-06-11 ENCOUNTER — HOSPITAL ENCOUNTER (OUTPATIENT)
Dept: RADIOLOGY | Facility: HOSPITAL | Age: 75
Discharge: HOME | End: 2025-06-11
Attending: STUDENT IN AN ORGANIZED HEALTH CARE EDUCATION/TRAINING PROGRAM
Payer: MEDICARE

## 2025-06-11 ENCOUNTER — OFFICE VISIT (OUTPATIENT)
Dept: FAMILY MEDICINE | Facility: CLINIC | Age: 75
End: 2025-06-11
Payer: MEDICARE

## 2025-06-11 VITALS
RESPIRATION RATE: 16 BRPM | OXYGEN SATURATION: 96 % | BODY MASS INDEX: 28.81 KG/M2 | HEART RATE: 70 BPM | DIASTOLIC BLOOD PRESSURE: 68 MMHG | SYSTOLIC BLOOD PRESSURE: 116 MMHG | WEIGHT: 194.5 LBS | HEIGHT: 69 IN

## 2025-06-11 DIAGNOSIS — R73.03 PREDIABETES: ICD-10-CM

## 2025-06-11 DIAGNOSIS — K21.9 GASTROESOPHAGEAL REFLUX DISEASE, UNSPECIFIED WHETHER ESOPHAGITIS PRESENT: ICD-10-CM

## 2025-06-11 DIAGNOSIS — Z86.79 HISTORY OF HYPOTENSION: ICD-10-CM

## 2025-06-11 DIAGNOSIS — Z13.220 ENCOUNTER FOR LIPID SCREENING FOR CARDIOVASCULAR DISEASE: ICD-10-CM

## 2025-06-11 DIAGNOSIS — E78.2 MODERATE MIXED HYPERLIPIDEMIA NOT REQUIRING STATIN THERAPY: Primary | ICD-10-CM

## 2025-06-11 DIAGNOSIS — R06.02 SHORTNESS OF BREATH: ICD-10-CM

## 2025-06-11 DIAGNOSIS — Z13.6 ENCOUNTER FOR LIPID SCREENING FOR CARDIOVASCULAR DISEASE: ICD-10-CM

## 2025-06-11 DIAGNOSIS — M85.89 OSTEOPENIA OF MULTIPLE SITES: ICD-10-CM

## 2025-06-11 PROCEDURE — 99999 PR PBB SHADOW E&M-EST. PATIENT-LVL IV: CPT | Mod: PBBFAC,,, | Performed by: STUDENT IN AN ORGANIZED HEALTH CARE EDUCATION/TRAINING PROGRAM

## 2025-06-11 PROCEDURE — 3078F DIAST BP <80 MM HG: CPT | Mod: CPTII,S$GLB,, | Performed by: STUDENT IN AN ORGANIZED HEALTH CARE EDUCATION/TRAINING PROGRAM

## 2025-06-11 PROCEDURE — 3074F SYST BP LT 130 MM HG: CPT | Mod: CPTII,S$GLB,, | Performed by: STUDENT IN AN ORGANIZED HEALTH CARE EDUCATION/TRAINING PROGRAM

## 2025-06-11 PROCEDURE — 1126F AMNT PAIN NOTED NONE PRSNT: CPT | Mod: CPTII,S$GLB,, | Performed by: STUDENT IN AN ORGANIZED HEALTH CARE EDUCATION/TRAINING PROGRAM

## 2025-06-11 PROCEDURE — 99214 OFFICE O/P EST MOD 30 MIN: CPT | Mod: S$GLB,,, | Performed by: STUDENT IN AN ORGANIZED HEALTH CARE EDUCATION/TRAINING PROGRAM

## 2025-06-11 PROCEDURE — 71046 X-RAY EXAM CHEST 2 VIEWS: CPT | Mod: TC,PN

## 2025-06-11 PROCEDURE — 3288F FALL RISK ASSESSMENT DOCD: CPT | Mod: CPTII,S$GLB,, | Performed by: STUDENT IN AN ORGANIZED HEALTH CARE EDUCATION/TRAINING PROGRAM

## 2025-06-11 PROCEDURE — 1101F PT FALLS ASSESS-DOCD LE1/YR: CPT | Mod: CPTII,S$GLB,, | Performed by: STUDENT IN AN ORGANIZED HEALTH CARE EDUCATION/TRAINING PROGRAM

## 2025-06-11 PROCEDURE — 3044F HG A1C LEVEL LT 7.0%: CPT | Mod: CPTII,S$GLB,, | Performed by: STUDENT IN AN ORGANIZED HEALTH CARE EDUCATION/TRAINING PROGRAM

## 2025-06-11 PROCEDURE — 1159F MED LIST DOCD IN RCRD: CPT | Mod: CPTII,S$GLB,, | Performed by: STUDENT IN AN ORGANIZED HEALTH CARE EDUCATION/TRAINING PROGRAM

## 2025-06-11 PROCEDURE — 1157F ADVNC CARE PLAN IN RCRD: CPT | Mod: CPTII,S$GLB,, | Performed by: STUDENT IN AN ORGANIZED HEALTH CARE EDUCATION/TRAINING PROGRAM

## 2025-06-11 PROCEDURE — 71046 X-RAY EXAM CHEST 2 VIEWS: CPT | Mod: 26,,, | Performed by: RADIOLOGY

## 2025-06-11 RX ORDER — OMEPRAZOLE 40 MG/1
40 CAPSULE, DELAYED RELEASE ORAL EVERY MORNING
Qty: 90 CAPSULE | Refills: 1 | Status: SHIPPED | OUTPATIENT
Start: 2025-06-11

## 2025-06-11 NOTE — PROGRESS NOTES
Subjective:       Patient ID: Sosa Viramontes is a 74 y.o. female.    Chief Complaint: Shortness of Breath (For about a year)    History of Present Illness    CHIEF COMPLAINT:  Ms. Viramontes presents today for evaluation of shortness of breath.    HISTORY OF PRESENT ILLNESS:  She experiences shortness of breath while climbing a small hill in [place], requiring multiple stops to catch breath. She maintains normal blood oxygen levels, even during previous COVID infection. She believes her weight may be contributing to her breathing difficulties, specifically noting that her abdomen may be pressing against her lungs.    FAMILY HISTORY:  Grandmother  of pulmonary problems and father  from complications of emphysema.    IMAGIN imaging revealed lung nodule.    NEUROLOGICAL:  She experienced severe vertigo without loss of consciousness in  while standing from motorcycle. Recovery took 15 minutes, requiring oral electrolyte supplementation and leg elevation. She has a history of vasovagal episodes and neurocardiogenic syncope confirmed by tilt table testing in .    MUSCULOSKELETAL:  She reports SI joint pain that occurred during yoga, requiring discontinuation of the activity.    WEIGHT MANAGEMENT:  She reports difficulty losing weight despite ongoing efforts.       Review of Systems   Constitutional:  Negative for activity change and appetite change.   Respiratory:  Positive for shortness of breath. Negative for chest tightness and wheezing.    Cardiovascular:  Negative for chest pain.   Gastrointestinal:  Negative for abdominal pain.   Genitourinary:  Negative for dysuria.   Integumentary:  Negative for rash.   Psychiatric/Behavioral:  Negative for sleep disturbance.       Problem List[1]  Sosa has a current medication list which includes the following prescription(s): calcium-vitamin d, estradiol, famotidine, fexofenadine, magnesium oxide, meclizine, montelukast, trazodone, and omeprazole.         Health Maintenance Due   Topic Date Due    RSV Vaccine (Age 60+ and Pregnant patients) (1 - 1-dose 75+ series) Never done      Health Maintenance reviewed and discussed- encourage labs.     Objective:      Physical Exam  Constitutional:       General: She is not in acute distress.     Appearance: Normal appearance. She is not ill-appearing.   Eyes:      Conjunctiva/sclera: Conjunctivae normal.   Cardiovascular:      Rate and Rhythm: Normal rate and regular rhythm.      Heart sounds: Normal heart sounds. No murmur heard.  Pulmonary:      Effort: Pulmonary effort is normal. No respiratory distress.      Breath sounds: Normal breath sounds. No wheezing or rales.   Musculoskeletal:      Right lower leg: No edema.      Left lower leg: No edema.   Lymphadenopathy:      Cervical: No cervical adenopathy.   Skin:     General: Skin is warm and dry.   Neurological:      Mental Status: She is alert. Mental status is at baseline.      Gait: Gait normal.   Psychiatric:         Mood and Affect: Mood normal.         Behavior: Behavior normal.         Thought Content: Thought content normal.         Judgment: Judgment normal.             Assessment:       Assessment & Plan    1. Evaluated options for weight management, noting Medicare's limitations on coverage for weight loss medications.  2. Considered recommendation of weight loss clinic as alternative approach to weight management.           Plan:       1. Moderate mixed hyperlipidemia not requiring statin therapy  Assessment & Plan:  Stable. Continue current diet and regular followup.    Lab Results   Component Value Date    CHOL 205 (H) 04/02/2024    CHOL 176 02/17/2022    CHOL 197 02/12/2021      Lab Results   Component Value Date    HDL 36 (L) 04/02/2024    HDL 30 (L) 02/17/2022    HDL 38 (L) 02/12/2021     Lab Results   Component Value Date    LDLCALC 126.0 04/02/2024    LDLCALC 110.0 02/17/2022    LDLCALC 137.0 02/12/2021      Lab Results   Component Value Date    TRIG  215 (H) 04/02/2024    TRIG 180 (H) 02/17/2022    TRIG 110 02/12/2021     Lab Results   Component Value Date    TOTALCHOLEST 5.7 (H) 04/02/2024    TOTALCHOLEST 5.9 (H) 02/17/2022    TOTALCHOLEST 5.2 (H) 02/12/2021     Lab Results   Component Value Date    NONHDLCHOL 169 04/02/2024    NONHDLCHOL 146 02/17/2022    NONHDLCHOL 159 02/12/2021     Lab Results   Component Value Date    CHOLHDL 17.6 (L) 04/02/2024    CHOLHDL 17.0 (L) 02/17/2022    CHOLHDL 19.3 (L) 02/12/2021     The 10-year ASCVD risk score (Sheree LOPEZ, et al., 2019) is: 13.4%    Values used to calculate the score:      Age: 74 years      Sex: Female      Is Non- : No      Diabetic: No      Tobacco smoker: No      Systolic Blood Pressure: 116 mmHg      Is BP treated: No      HDL Cholesterol: 36 mg/dL      Total Cholesterol: 205 mg/dL        2. Gastroesophageal reflux disease, unspecified whether esophagitis present  Assessment & Plan:  Stable. Continue current medications and regular followup.      Orders:  -     omeprazole (PRILOSEC) 40 MG capsule; Take 1 capsule (40 mg total) by mouth every morning.  Dispense: 90 capsule; Refill: 1    3. Prediabetes  Assessment & Plan:  Lab Results   Component Value Date    HGBA1C 5.5 04/02/2024     Stable. Continue current diet and regular followup.        Orders:  -     Hemoglobin A1C; Future; Expected date: 06/11/2025  -     CBC Without Differential; Future; Expected date: 06/11/2025  -     Comprehensive Metabolic Panel; Future; Expected date: 06/11/2025  -     TSH; Future; Expected date: 06/11/2025  -     Microalbumin/Creatinine Ratio, Urine; Future; Expected date: 06/11/2025    4. History of hypotension  Assessment & Plan:  Had another episode and none after that last one.  Wants to stay active      5. Osteopenia of multiple sites  Assessment & Plan:  Dexa in 2021  There is a 12.9% risk of a major osteoporotic fracture and a 2.1% risk of hip fracture in the next 10 years (FRAX).      Impression:  Osteopenia of the lumbar spine, normal bone mineral density of the left hip.    On calcium/vitamin D    Repeat and follow      6. Shortness of breath  -     X-Ray Chest PA And Lateral; Future; Expected date: 06/11/2025  -     Complete PFT w/ bronchodilator; Future  -     Ambulatory referral/consult to Cardiology; Future; Expected date: 06/18/2025    7. Encounter for lipid screening for cardiovascular disease  -     Lipid Panel; Future; Expected date: 06/11/2025         This note was generated with the assistance of ambient listening technology. Verbal consent was obtained by the patient and accompanying visitor(s) for the recording of patient appointment to facilitate this note. I attest to having reviewed and edited the generated note for accuracy, though some syntax or spelling errors may persist. Please contact the author of this note for any clarification.                    [1]   Patient Active Problem List  Diagnosis    Chronic constipation    Vaginal atrophy    S/P RA-diagnostic laparoscopy, A&P repair, MUS, and cystoscopy    SVT (supraventricular tachycardia)    Williamson's esophagus    Diverticulosis of colon    Gastroesophageal reflux disease    Generalized anxiety disorder    Hemorrhoids without complication    Moderate mixed hyperlipidemia not requiring statin therapy    Lumbar radiculopathy    Microscopic colitis    Mild cognitive disorder    Osteoarthritis    Primary insomnia    Psoriasis    Chronic rhinitis    Vertigo    Leg cramps    Abdominal adhesions    Hydradenitis    Prediabetes    Osteopenia of multiple sites    History of hypotension    Peripheral vascular disease, unspecified    Overweight (BMI 25.0-29.9)    Stress incontinence of urine

## 2025-06-11 NOTE — ASSESSMENT & PLAN NOTE
Dexa in 2021  There is a 12.9% risk of a major osteoporotic fracture and a 2.1% risk of hip fracture in the next 10 years (FRAX).     Impression:  Osteopenia of the lumbar spine, normal bone mineral density of the left hip.    On calcium/vitamin D    Repeat and follow

## 2025-06-11 NOTE — ASSESSMENT & PLAN NOTE
Stable. Continue current diet and regular followup.    Lab Results   Component Value Date    CHOL 205 (H) 04/02/2024    CHOL 176 02/17/2022    CHOL 197 02/12/2021      Lab Results   Component Value Date    HDL 36 (L) 04/02/2024    HDL 30 (L) 02/17/2022    HDL 38 (L) 02/12/2021     Lab Results   Component Value Date    LDLCALC 126.0 04/02/2024    LDLCALC 110.0 02/17/2022    LDLCALC 137.0 02/12/2021      Lab Results   Component Value Date    TRIG 215 (H) 04/02/2024    TRIG 180 (H) 02/17/2022    TRIG 110 02/12/2021     Lab Results   Component Value Date    TOTALCHOLEST 5.7 (H) 04/02/2024    TOTALCHOLEST 5.9 (H) 02/17/2022    TOTALCHOLEST 5.2 (H) 02/12/2021     Lab Results   Component Value Date    NONHDLCHOL 169 04/02/2024    NONHDLCHOL 146 02/17/2022    NONHDLCHOL 159 02/12/2021     Lab Results   Component Value Date    CHOLHDL 17.6 (L) 04/02/2024    CHOLHDL 17.0 (L) 02/17/2022    CHOLHDL 19.3 (L) 02/12/2021     The 10-year ASCVD risk score (Sheree LOPEZ, et al., 2019) is: 13.4%    Values used to calculate the score:      Age: 74 years      Sex: Female      Is Non- : No      Diabetic: No      Tobacco smoker: No      Systolic Blood Pressure: 116 mmHg      Is BP treated: No      HDL Cholesterol: 36 mg/dL      Total Cholesterol: 205 mg/dL

## 2025-06-12 ENCOUNTER — LAB VISIT (OUTPATIENT)
Dept: LAB | Facility: HOSPITAL | Age: 75
End: 2025-06-12
Attending: STUDENT IN AN ORGANIZED HEALTH CARE EDUCATION/TRAINING PROGRAM
Payer: MEDICARE

## 2025-06-12 ENCOUNTER — RESULTS FOLLOW-UP (OUTPATIENT)
Dept: FAMILY MEDICINE | Facility: CLINIC | Age: 75
End: 2025-06-12

## 2025-06-12 DIAGNOSIS — R73.03 PREDIABETES: ICD-10-CM

## 2025-06-12 DIAGNOSIS — Z13.6 ENCOUNTER FOR LIPID SCREENING FOR CARDIOVASCULAR DISEASE: ICD-10-CM

## 2025-06-12 DIAGNOSIS — J43.8 OTHER EMPHYSEMA: Primary | ICD-10-CM

## 2025-06-12 DIAGNOSIS — Z13.220 ENCOUNTER FOR LIPID SCREENING FOR CARDIOVASCULAR DISEASE: ICD-10-CM

## 2025-06-12 LAB
ALBUMIN SERPL BCP-MCNC: 3.9 G/DL (ref 3.5–5.2)
ALBUMIN/CREAT UR: 10.1 UG/MG
ALP SERPL-CCNC: 74 UNIT/L (ref 40–150)
ALT SERPL W/O P-5'-P-CCNC: 19 UNIT/L (ref 10–44)
ANION GAP (OHS): 8 MMOL/L (ref 8–16)
AST SERPL-CCNC: 17 UNIT/L (ref 11–45)
BILIRUB SERPL-MCNC: 0.6 MG/DL (ref 0.1–1)
BUN SERPL-MCNC: 15 MG/DL (ref 8–23)
CALCIUM SERPL-MCNC: 9.6 MG/DL (ref 8.7–10.5)
CHLORIDE SERPL-SCNC: 109 MMOL/L (ref 95–110)
CHOLEST SERPL-MCNC: 202 MG/DL (ref 120–199)
CHOLEST/HDLC SERPL: 5.1 {RATIO} (ref 2–5)
CO2 SERPL-SCNC: 24 MMOL/L (ref 23–29)
CREAT SERPL-MCNC: 0.9 MG/DL (ref 0.5–1.4)
CREAT UR-MCNC: 89 MG/DL (ref 15–325)
EAG (OHS): 114 MG/DL (ref 68–131)
ERYTHROCYTE [DISTWIDTH] IN BLOOD BY AUTOMATED COUNT: 13.2 % (ref 11.5–14.5)
GFR SERPLBLD CREATININE-BSD FMLA CKD-EPI: >60 ML/MIN/1.73/M2
GLUCOSE SERPL-MCNC: 101 MG/DL (ref 70–110)
HBA1C MFR BLD: 5.6 % (ref 4–5.6)
HCT VFR BLD AUTO: 44.3 % (ref 37–48.5)
HDLC SERPL-MCNC: 40 MG/DL (ref 40–75)
HDLC SERPL: 19.8 % (ref 20–50)
HGB BLD-MCNC: 14.8 GM/DL (ref 12–16)
LDLC SERPL CALC-MCNC: 136 MG/DL (ref 63–159)
MCH RBC QN AUTO: 29.4 PG (ref 27–31)
MCHC RBC AUTO-ENTMCNC: 33.4 G/DL (ref 32–36)
MCV RBC AUTO: 88 FL (ref 82–98)
MICROALBUMIN UR-MCNC: 9 UG/ML (ref ?–5000)
NONHDLC SERPL-MCNC: 162 MG/DL
PLATELET # BLD AUTO: 233 K/UL (ref 150–450)
PMV BLD AUTO: 11.3 FL (ref 9.2–12.9)
POTASSIUM SERPL-SCNC: 4.2 MMOL/L (ref 3.5–5.1)
PROT SERPL-MCNC: 7 GM/DL (ref 6–8.4)
RBC # BLD AUTO: 5.03 M/UL (ref 4–5.4)
SODIUM SERPL-SCNC: 141 MMOL/L (ref 136–145)
TRIGL SERPL-MCNC: 130 MG/DL (ref 30–150)
TSH SERPL-ACNC: 1 UIU/ML (ref 0.4–4)
WBC # BLD AUTO: 6.09 K/UL (ref 3.9–12.7)

## 2025-06-12 PROCEDURE — 82043 UR ALBUMIN QUANTITATIVE: CPT

## 2025-06-12 PROCEDURE — 83036 HEMOGLOBIN GLYCOSYLATED A1C: CPT

## 2025-06-12 PROCEDURE — 84443 ASSAY THYROID STIM HORMONE: CPT

## 2025-06-12 PROCEDURE — 85027 COMPLETE CBC AUTOMATED: CPT

## 2025-06-12 PROCEDURE — 36415 COLL VENOUS BLD VENIPUNCTURE: CPT

## 2025-06-12 PROCEDURE — 80061 LIPID PANEL: CPT

## 2025-06-12 PROCEDURE — 80053 COMPREHEN METABOLIC PANEL: CPT

## 2025-06-23 ENCOUNTER — PROCEDURE VISIT (OUTPATIENT)
Dept: RESPIRATORY THERAPY | Facility: HOSPITAL | Age: 75
End: 2025-06-23
Attending: STUDENT IN AN ORGANIZED HEALTH CARE EDUCATION/TRAINING PROGRAM
Payer: MEDICARE

## 2025-06-23 DIAGNOSIS — R06.02 SHORTNESS OF BREATH: ICD-10-CM

## 2025-06-23 LAB
DLCO SINGLE BREATH LLN: 17.86
DLCO SINGLE BREATH PRE REF: 86 %
DLCO SINGLE BREATH REF: 23.59
DLCOC SBVA LLN: 2.88
DLCOC SBVA PRE REF: 93.2 %
DLCOC SBVA REF: 4.1
DLCOC SINGLE BREATH LLN: 17.86
DLCOC SINGLE BREATH PRE REF: 86 %
DLCOC SINGLE BREATH REF: 23.59
DLCOVA LLN: 2.88
DLCOVA PRE REF: 93.2 %
DLCOVA PRE: 3.82 ML/(MIN*MMHG*L) (ref 2.88–5.32)
DLCOVA REF: 4.1
ERVN2 LLN: -16449.37
ERVN2 PRE REF: 102 %
ERVN2 PRE: 0.64 L (ref -16449.37–16450.63)
ERVN2 REF: 0.63
FEF 25 75 CHG: 14 %
FEF 25 75 LLN: 0.82
FEF 25 75 POST REF: 28.8 %
FEF 25 75 PRE REF: 25.3 %
FEF 25 75 REF: 1.87
FET100 CHG: 9.5 %
FEV1 CHG: 18.6 %
FEV1 FVC CHG: 0.8 %
FEV1 FVC LLN: 63
FEV1 FVC POST REF: 65.8 %
FEV1 FVC PRE REF: 65.3 %
FEV1 FVC REF: 77
FEV1 LLN: 1.71
FEV1 POST REF: 75.4 %
FEV1 PRE REF: 63.6 %
FEV1 REF: 2.4
FRCN2 LLN: 2.17
FRCN2 PRE REF: 126.5 %
FRCN2 REF: 3
FVC CHG: 17.6 %
FVC LLN: 2.26
FVC POST REF: 113.1 %
FVC PRE REF: 96.2 %
FVC REF: 3.16
IVC PRE: 2.86 L (ref 2.26–4.07)
IVC SINGLE BREATH LLN: 2.26
IVC SINGLE BREATH PRE REF: 90.5 %
IVC SINGLE BREATH REF: 3.16
MVV LLN: 83
MVV PRE REF: 46.2 %
MVV REF: 98
PEF CHG: 7.8 %
PEF LLN: 4.04
PEF POST REF: 80.9 %
PEF PRE REF: 75.1 %
PEF REF: 6.03
POST FEF 25 75: 0.54 L/S (ref 0.82–2.93)
POST FET 100: 18.37 SEC
POST FEV1 FVC: 50.61 % (ref 62.96–90.79)
POST FEV1: 1.81 L (ref 1.71–3.09)
POST FVC: 3.58 L (ref 2.26–4.07)
POST PEF: 4.88 L/S (ref 4.04–8.01)
PRE DLCO: 20.3 ML/(MIN*MMHG) (ref 17.86–29.32)
PRE FEF 25 75: 0.47 L/S (ref 0.82–2.93)
PRE FET 100: 16.78 SEC
PRE FEV1 FVC: 50.2 % (ref 62.96–90.79)
PRE FEV1: 1.53 L (ref 1.71–3.09)
PRE FRC N2: 3.79 L
PRE FVC: 3.04 L (ref 2.26–4.07)
PRE MVV: 45 L/MIN (ref 82.88–112.13)
PRE PEF: 4.53 L/S (ref 4.04–8.01)
RVN2 LLN: 1.79
RVN2 PRE REF: 109.3 %
RVN2 PRE: 2.59 L (ref 1.79–2.94)
RVN2 REF: 2.37
RVN2TLCN2 LLN: 34.87
RVN2TLCN2 PRE REF: 103.4 %
RVN2TLCN2 PRE: 45.97 % (ref 34.87–54.05)
RVN2TLCN2 REF: 44.46
TLCN2 LLN: 4.77
TLCN2 PRE REF: 97.7 %
TLCN2 PRE: 5.63 L (ref 4.77–6.75)
TLCN2 REF: 5.76
VA PRE: 5.32 L (ref 5.61–5.61)
VA SINGLE BREATH LLN: 5.61
VA SINGLE BREATH PRE REF: 94.9 %
VA SINGLE BREATH REF: 5.61
VCMAXN2 LLN: 2.26
VCMAXN2 PRE REF: 96.2 %
VCMAXN2 PRE: 3.04 L (ref 2.26–4.07)
VCMAXN2 REF: 3.16

## 2025-06-23 PROCEDURE — 94060 EVALUATION OF WHEEZING: CPT

## 2025-06-23 PROCEDURE — 94727 GAS DIL/WSHOT DETER LNG VOL: CPT

## 2025-06-23 PROCEDURE — 25000242 PHARM REV CODE 250 ALT 637 W/ HCPCS

## 2025-06-23 PROCEDURE — 94729 DIFFUSING CAPACITY: CPT

## 2025-06-23 RX ORDER — ALBUTEROL SULFATE 0.83 MG/ML
SOLUTION RESPIRATORY (INHALATION)
Status: COMPLETED
Start: 2025-06-23 | End: 2025-06-23

## 2025-06-23 RX ORDER — ALBUTEROL SULFATE 0.83 MG/ML
2.5 SOLUTION RESPIRATORY (INHALATION) ONCE
Status: DISPENSED | OUTPATIENT
Start: 2025-06-23

## 2025-06-23 RX ADMIN — ALBUTEROL SULFATE 2.5 MG: 2.5 SOLUTION RESPIRATORY (INHALATION) at 08:06

## 2025-06-23 NOTE — PROGRESS NOTES
Complete pft with bronchodilator Albuterol 2.5mg/3ccns. No complications during testing. See pft graph for data

## 2025-06-30 ENCOUNTER — TELEPHONE (OUTPATIENT)
Dept: FAMILY MEDICINE | Facility: CLINIC | Age: 75
End: 2025-06-30
Payer: MEDICARE

## 2025-06-30 PROBLEM — J43.8 OTHER EMPHYSEMA: Status: ACTIVE | Noted: 2025-06-30

## 2025-06-30 RX ORDER — TIOTROPIUM BROMIDE INHALATION SPRAY 1.56 UG/1
2 SPRAY, METERED RESPIRATORY (INHALATION) DAILY
Qty: 4 G | Refills: 6 | Status: SHIPPED | OUTPATIENT
Start: 2025-06-30 | End: 2025-07-09 | Stop reason: SDUPTHER

## 2025-06-30 NOTE — TELEPHONE ENCOUNTER
Copied from CRM #[id number]. Topic: Medications - Medication Question  >> Jun 30, 2025 10:34 AM Eunice wrote:  Type:  Needs Medical Advice    Who Called: patient    Pharmacy name and phone #:    [id number] - [place], [place] - [address]  [address]  [place] [address]  Phone: [phone] Fax: [phone]     Would the patient rather a call back or a response via [facility]? call  Best Call Back Number: [phone]   Additional Information: patient states the rx for tiotropium bromide (SPIRIVA RESPIMAT) 1.25 mcg/actuation inhaler is too expensive and wants to know if there is an alternative? Please call

## 2025-07-08 DIAGNOSIS — J43.8 OTHER EMPHYSEMA: ICD-10-CM

## 2025-07-08 NOTE — TELEPHONE ENCOUNTER
PA submitted for Spiriva for cost reduction. Insurance kicked back NO PA required.   Pt requesting alternative medication to be sent Divya's

## 2025-07-09 RX ORDER — TIOTROPIUM BROMIDE INHALATION SPRAY 1.56 UG/1
2 SPRAY, METERED RESPIRATORY (INHALATION) DAILY
Qty: 4 G | Refills: 6 | Status: SHIPPED | OUTPATIENT
Start: 2025-07-09

## 2025-07-09 NOTE — TELEPHONE ENCOUNTER
No care due was identified.  [facility] Embedded Care Due Messages. Reference number: [id number].   7/09/2025 1:54:47 PM CDT

## 2025-07-09 NOTE — TELEPHONE ENCOUNTER
Spoke w/ pt  Pt spoke with insurance. Prescription will cost $46, plus $500 deductible. Pt agreed to pay cost upfront for the prescription. Pharmacy change, pended sprivia to MD

## 2025-08-19 ENCOUNTER — LAB VISIT (OUTPATIENT)
Dept: LAB | Facility: HOSPITAL | Age: 75
End: 2025-08-19
Attending: DERMATOLOGY
Payer: MEDICARE

## 2025-08-19 DIAGNOSIS — Z79.899 POLYPHARMACY: ICD-10-CM

## 2025-08-19 DIAGNOSIS — L40.3 BACTERID: Primary | ICD-10-CM

## 2025-08-19 LAB
ALBUMIN SERPL BCP-MCNC: 3.8 G/DL (ref 3.5–5.2)
ALP SERPL-CCNC: 64 UNIT/L (ref 40–150)
ALT SERPL W/O P-5'-P-CCNC: 20 UNIT/L (ref 10–44)
ANION GAP (OHS): 8 MMOL/L (ref 8–16)
AST SERPL-CCNC: 26 UNIT/L (ref 11–45)
BILIRUB SERPL-MCNC: 0.5 MG/DL (ref 0.1–1)
BUN SERPL-MCNC: 13 MG/DL (ref 8–23)
CALCIUM SERPL-MCNC: 9.1 MG/DL (ref 8.7–10.5)
CHLORIDE SERPL-SCNC: 108 MMOL/L (ref 95–110)
CO2 SERPL-SCNC: 24 MMOL/L (ref 23–29)
CREAT SERPL-MCNC: 0.9 MG/DL (ref 0.5–1.4)
ERYTHROCYTE [DISTWIDTH] IN BLOOD BY AUTOMATED COUNT: 13.2 % (ref 11.5–14.5)
GFR SERPLBLD CREATININE-BSD FMLA CKD-EPI: >60 ML/MIN/1.73/M2
GLUCOSE SERPL-MCNC: 94 MG/DL (ref 70–110)
HCT VFR BLD AUTO: 42.4 % (ref 37–48.5)
HGB BLD-MCNC: 14.3 GM/DL (ref 12–16)
MCH RBC QN AUTO: 29.2 PG (ref 27–31)
MCHC RBC AUTO-ENTMCNC: 33.7 G/DL (ref 32–36)
MCV RBC AUTO: 87 FL (ref 82–98)
PLATELET # BLD AUTO: 209 K/UL (ref 150–450)
PMV BLD AUTO: 11.3 FL (ref 9.2–12.9)
POTASSIUM SERPL-SCNC: 4.4 MMOL/L (ref 3.5–5.1)
PROT SERPL-MCNC: 6.8 GM/DL (ref 6–8.4)
RBC # BLD AUTO: 4.89 M/UL (ref 4–5.4)
SODIUM SERPL-SCNC: 140 MMOL/L (ref 136–145)
WBC # BLD AUTO: 5.86 K/UL (ref 3.9–12.7)

## 2025-08-19 PROCEDURE — 86480 TB TEST CELL IMMUN MEASURE: CPT

## 2025-08-19 PROCEDURE — 85027 COMPLETE CBC AUTOMATED: CPT

## 2025-08-19 PROCEDURE — 36415 COLL VENOUS BLD VENIPUNCTURE: CPT

## 2025-08-19 PROCEDURE — 82040 ASSAY OF SERUM ALBUMIN: CPT

## 2025-08-20 LAB
MITOGEN MINUS NIL (OHS): 4.88
NIL TB SYNCED (OHS): 0.04
QUANTIFERON GOLD INTERP (OHS): NEGATIVE
TB1 AG MINUS NIL (OHS): 0.01
TB2 AG MINUS NIL (OHS): 0.02

## (undated) DEVICE — PORT ACCESS 8MM W/120MM LOW

## (undated) DEVICE — DRAPE STERI INSTRUMENT 1018

## (undated) DEVICE — PAD ABD 8X10 STERILE

## (undated) DEVICE — DEVICE ANC SW STAT FOLEY 6-24

## (undated) DEVICE — JELLY SURGILUBE 5GR

## (undated) DEVICE — GLOVE BIOGEL SKINSENSE PI 7.0

## (undated) DEVICE — DRAPE ARM DAVINCI XI

## (undated) DEVICE — POSITIONER HEEL FOAM CONVOLTD

## (undated) DEVICE — GLOVE BIOGEL SKINSENSE PI 6.5

## (undated) DEVICE — SEE MEDLINE ITEM 152186

## (undated) DEVICE — SEE MEDLINE ITEM 156923

## (undated) DEVICE — SET TRI-LUMEN FILTERED TUBE

## (undated) DEVICE — SOL STRL WATER INJ 1000ML BG

## (undated) DEVICE — DRAPE COLUMN DAVINCI XI

## (undated) DEVICE — ADHESIVE DERMABOND ADVANCED

## (undated) DEVICE — IRRIGATOR ENDOSCOPY DISP.

## (undated) DEVICE — SOL NS 1000CC

## (undated) DEVICE — SUT VICRYL CTD 2-0 GI 27 SH

## (undated) DEVICE — SEE MEDLINE ITEM 146347

## (undated) DEVICE — SUT MCRYL PLUS 4-0 PS2 27IN

## (undated) DEVICE — SOL ELECTROLUBE ANTI-STIC

## (undated) DEVICE — SOL 9P NACL IRR PIC IL

## (undated) DEVICE — PAD PREP 50/CA

## (undated) DEVICE — KIT URINE METER 350ML STAT LOC

## (undated) DEVICE — SEE MEDLINE ITEM 157196

## (undated) DEVICE — NDL HYPO REG 25G X 1 1/2

## (undated) DEVICE — OBTURATOR BLADELESS 8MM XI CLR

## (undated) DEVICE — SEAL UNIVERSAL 5MM-8MM XI

## (undated) DEVICE — ELECTRODE REM PLYHSV RETURN 9

## (undated) DEVICE — UNDERGLOVES BIOGEL PI SZ 7 LF

## (undated) DEVICE — UNDERGLOVE BIOGEL PI SZ 6.5 LF

## (undated) DEVICE — SUT VICRYL 0 27 CT-2

## (undated) DEVICE — SET CYSTO IRRIGATION UNIV SPIK

## (undated) DEVICE — SUT PDS II 2-0 CT-2 VIL

## (undated) DEVICE — BLADE SURG STAINLESS STEEL #15

## (undated) DEVICE — BLADE SURG STAINLESS STEEL #11

## (undated) DEVICE — SYR 10CC LUER LOCK

## (undated) DEVICE — CLOSURE SKIN STERI STRIP 1/2X4

## (undated) DEVICE — SEE MEDLINE ITEM 156930

## (undated) DEVICE — SUT ETHIBOND EXCEL 0 CT2 30

## (undated) DEVICE — CATH FOL IC 3WAY 16F 5CCLF

## (undated) DEVICE — KIT WING PAD POSITIONING

## (undated) DEVICE — GAUZE PACKING VAG XRAY 2X6

## (undated) DEVICE — COVER TIP CURVED SCISSORS XI

## (undated) DEVICE — TROCAR ENDOPATH XCEL 5X100MM

## (undated) DEVICE — BANDAGE ADHESIVE